# Patient Record
Sex: MALE | Race: WHITE | NOT HISPANIC OR LATINO | Employment: OTHER | URBAN - METROPOLITAN AREA
[De-identification: names, ages, dates, MRNs, and addresses within clinical notes are randomized per-mention and may not be internally consistent; named-entity substitution may affect disease eponyms.]

---

## 2021-01-14 DIAGNOSIS — Z23 NEED FOR VACCINATION: ICD-10-CM

## 2021-03-12 ENCOUNTER — IMMUNIZATION (OUTPATIENT)
Dept: FAMILY PLANNING/WOMEN'S HEALTH CLINIC | Facility: IMMUNIZATION CENTER | Age: 76
End: 2021-03-12
Attending: INTERNAL MEDICINE
Payer: MEDICARE

## 2021-03-12 DIAGNOSIS — Z23 NEED FOR VACCINATION: ICD-10-CM

## 2021-03-12 DIAGNOSIS — Z23 ENCOUNTER FOR VACCINATION: Primary | ICD-10-CM

## 2021-03-12 PROCEDURE — 0011A MODERNA SARS-COV-2 VACCINE: CPT

## 2021-03-12 PROCEDURE — 91301 MODERNA SARS-COV-2 VACCINE: CPT

## 2021-04-10 ENCOUNTER — IMMUNIZATION (OUTPATIENT)
Dept: FAMILY PLANNING/WOMEN'S HEALTH CLINIC | Facility: IMMUNIZATION CENTER | Age: 76
End: 2021-04-10
Attending: INTERNAL MEDICINE
Payer: MEDICARE

## 2021-04-10 DIAGNOSIS — Z23 ENCOUNTER FOR VACCINATION: Primary | ICD-10-CM

## 2021-04-10 PROCEDURE — 0012A MODERNA SARS-COV-2 VACCINE: CPT

## 2021-04-10 PROCEDURE — 91301 MODERNA SARS-COV-2 VACCINE: CPT

## 2021-11-15 ENCOUNTER — OFFICE VISIT (OUTPATIENT)
Dept: MEDICAL GROUP | Facility: CLINIC | Age: 76
End: 2021-11-15
Payer: COMMERCIAL

## 2021-11-15 VITALS
BODY MASS INDEX: 41.75 KG/M2 | OXYGEN SATURATION: 95 % | SYSTOLIC BLOOD PRESSURE: 140 MMHG | DIASTOLIC BLOOD PRESSURE: 80 MMHG | WEIGHT: 315 LBS | HEIGHT: 73 IN | HEART RATE: 76 BPM | TEMPERATURE: 97.6 F

## 2021-11-15 DIAGNOSIS — F41.9 ANXIETY: ICD-10-CM

## 2021-11-15 DIAGNOSIS — R35.1 BENIGN PROSTATIC HYPERPLASIA WITH NOCTURIA: ICD-10-CM

## 2021-11-15 DIAGNOSIS — N40.1 BENIGN PROSTATIC HYPERPLASIA WITH NOCTURIA: ICD-10-CM

## 2021-11-15 PROBLEM — M17.0 PRIMARY OSTEOARTHRITIS OF BOTH KNEES: Status: ACTIVE | Noted: 2020-10-05

## 2021-11-15 PROBLEM — N18.30 CHRONIC RENAL FAILURE IN PEDIATRIC PATIENT, STAGE 3 (MODERATE): Status: ACTIVE | Noted: 2020-09-15

## 2021-11-15 PROBLEM — I62.9 INTRACRANIAL HEMORRHAGE (HCC): Status: ACTIVE | Noted: 2021-02-25

## 2021-11-15 PROBLEM — H83.3X3 NOISE-INDUCED HEARING LOSS OF BOTH EARS: Status: ACTIVE | Noted: 2019-08-29

## 2021-11-15 PROBLEM — M10.9 GOUT: Status: ACTIVE | Noted: 2019-01-09

## 2021-11-15 PROBLEM — I10 ESSENTIAL HYPERTENSION: Status: ACTIVE | Noted: 2019-01-09

## 2021-11-15 PROBLEM — Z12.11 COLON CANCER SCREENING: Status: ACTIVE | Noted: 2021-02-25

## 2021-11-15 PROBLEM — N18.9 CHRONIC KIDNEY DISEASE, UNSPECIFIED: Status: ACTIVE | Noted: 2021-04-27

## 2021-11-15 PROBLEM — E66.01 MORBID OBESITY (HCC): Status: ACTIVE | Noted: 2020-09-15

## 2021-11-15 PROBLEM — N40.0 BENIGN PROSTATIC HYPERPLASIA: Status: ACTIVE | Noted: 2021-02-25

## 2021-11-15 PROBLEM — R73.03 PREDIABETES: Status: ACTIVE | Noted: 2021-04-27

## 2021-11-15 PROCEDURE — 99214 OFFICE O/P EST MOD 30 MIN: CPT | Mod: GC | Performed by: STUDENT IN AN ORGANIZED HEALTH CARE EDUCATION/TRAINING PROGRAM

## 2021-11-15 RX ORDER — COLCHICINE 0.6 MG/1
0.6 TABLET ORAL DAILY
Qty: 30 TABLET | Refills: 3 | Status: SHIPPED | OUTPATIENT
Start: 2021-11-15 | End: 2022-05-17

## 2021-11-15 RX ORDER — PRAZOSIN HYDROCHLORIDE 1 MG/1
1 CAPSULE ORAL EVERY EVENING
Qty: 30 CAPSULE | Refills: 6 | Status: SHIPPED | OUTPATIENT
Start: 2021-11-15 | End: 2022-10-03 | Stop reason: SDUPTHER

## 2021-11-15 RX ORDER — ALLOPURINOL 300 MG/1
300 TABLET ORAL DAILY
Qty: 30 TABLET | Refills: 6 | Status: SHIPPED | OUTPATIENT
Start: 2021-11-15 | End: 2022-05-17

## 2021-11-15 RX ORDER — PRAZOSIN HYDROCHLORIDE 1 MG/1
1 CAPSULE ORAL NIGHTLY
COMMUNITY
End: 2021-11-15 | Stop reason: SDUPTHER

## 2021-11-15 RX ORDER — LISINOPRIL 10 MG/1
10 TABLET ORAL DAILY
Qty: 30 TABLET | Refills: 6 | Status: SHIPPED | OUTPATIENT
Start: 2021-11-15 | End: 2022-05-17

## 2021-11-15 RX ORDER — HYDROCHLOROTHIAZIDE 25 MG/1
25 TABLET ORAL DAILY
COMMUNITY
End: 2021-11-15

## 2021-11-15 RX ORDER — FLUOXETINE 10 MG/1
10 CAPSULE ORAL DAILY
Qty: 60 CAPSULE | Refills: 3 | Status: SHIPPED | OUTPATIENT
Start: 2021-11-15 | End: 2022-01-14

## 2021-11-15 RX ORDER — LISINOPRIL 10 MG/1
10 TABLET ORAL DAILY
COMMUNITY
End: 2021-11-15 | Stop reason: SDUPTHER

## 2021-11-15 RX ORDER — COLCHICINE 0.6 MG/1
0.6 TABLET ORAL DAILY
COMMUNITY
End: 2021-11-15 | Stop reason: SDUPTHER

## 2021-11-15 RX ORDER — PREDNISONE 20 MG/1
20 TABLET ORAL DAILY
Qty: 30 TABLET | Refills: 1 | Status: SHIPPED | OUTPATIENT
Start: 2021-11-15 | End: 2022-05-17

## 2021-11-15 RX ORDER — TAMSULOSIN HYDROCHLORIDE 0.4 MG/1
0.4 CAPSULE ORAL
Qty: 60 CAPSULE | Refills: 0 | Status: SHIPPED | OUTPATIENT
Start: 2021-11-15 | End: 2022-01-17 | Stop reason: SDUPTHER

## 2021-11-15 RX ORDER — PREDNISONE 20 MG/1
20 TABLET ORAL DAILY
COMMUNITY
End: 2021-11-15 | Stop reason: SDUPTHER

## 2021-11-15 RX ORDER — ALLOPURINOL 300 MG/1
300 TABLET ORAL DAILY
COMMUNITY
End: 2021-11-15 | Stop reason: SDUPTHER

## 2021-11-15 ASSESSMENT — PATIENT HEALTH QUESTIONNAIRE - PHQ9: CLINICAL INTERPRETATION OF PHQ2 SCORE: 0

## 2021-11-15 NOTE — PROGRESS NOTES
Subjective:     CC: Anxiety, poor sleep     HPI:   Sim presents today for medication refills and to discuss his anxiety       Problem   Anxiety    Significant burden of panic attacks and symptoms also consistent with PTSD. Daily morning panic attacks typically related to pervasive anxiety stemming from his health problems and debility as well as the suicide of his mother and brother.     Is interested in medication for anxiety, will consider therapy           Benign Prostatic Hyperplasia    Currently taking prazosin 1 mg nightly with reduction in night time awakenings. Would like another agent. Currently with approximately 3 night time awakenings, disturbs quality of sleep.           Goat well controlled on current regimen. No flares.       Current Outpatient Medications Ordered in Epic   Medication Sig Dispense Refill   • allopurinol (ZYLOPRIM) 300 MG Tab Take 300 mg by mouth every day. 300 mg am and 150 mg at night time     • lisinopril (PRINIVIL) 10 MG Tab Take 10 mg by mouth every day. 1 po dialy     • predniSONE (DELTASONE) 20 MG Tab Take 20 mg by mouth every day. As needed     • colchicine (COLCRYS) 0.6 MG Tab Take 0.6 mg by mouth every day. As needed for goult     • prazosin (MINIPRESS) 1 MG Cap Take 1 mg by mouth every evening. 1 po at night time     • FLUoxetine (PROZAC) 10 MG Cap Take 1 Capsule by mouth every day for 60 days. 60 Capsule 3   • tamsulosin (FLOMAX) 0.4 MG capsule Take 1 Capsule by mouth 1/2 hour after breakfast for 60 days. 60 Capsule 0     No current Epic-ordered facility-administered medications on file.       Health Maintenance: COVID 19 booster scheduled for Monday of next week.     Colonoscopy completed 2/25/21    ROS:  Gen: no fevers/chills, no changes in weight  Eyes: no changes in vision  ENT: no sore throat, no hearing loss, no bloody nose  Pulm: no sob, no cough  CV: no chest pain, no palpitations  GI: no nausea/vomiting, no diarrhea  : no dysuria  MSk: no myalgias  Skin: no  "rash  Neuro: no headaches, no numbness/tingling  Heme/Lymph: no easy bruising      Objective:     Exam:  /80   Pulse 76   Temp 36.4 °C (97.6 °F)   Ht 1.854 m (6' 1\")   Wt (!) 151 kg (333 lb)   SpO2 95%   BMI 43.93 kg/m²  Body mass index is 43.93 kg/m².    Gen: Alert and oriented, No apparent distress.  Neck: Neck is supple without lymphadenopathy.  Lungs: Normal effort, CTA bilaterally, no wheezes, rhonchi, or rales  CV: Regular rate and rhythm. No murmurs, rubs, or gallops.  Ext: No clubbing, cyanosis, edema.      Labs: Due for labs at next visit for T2DM, CKD3    Assessment & Plan:     76 y.o. male with the following -     Problem List Items Addressed This Visit     Benign prostatic hyperplasia     Addition of Tamsulosin to regimen            Anxiety     Behavioral therapy resources provided, counseling provided, rx for fluoxetine for anxiety with caution for weight gain as this is one of Mr. Osorio's prominent medical issues and he has recently had 40 lbs weight loss in the past year.              Patient needs slepp study, used to use CPAP, has symptoms of sleep apnea. Currently not interested in sleep study and would like to defer to follow up visit.     Follow up in 3-6 months for routine labs and to see how anxiety therapy is going.     "

## 2021-11-15 NOTE — ASSESSMENT & PLAN NOTE
Behavioral therapy resources provided, counseling provided, rx for fluoxetine for anxiety with caution for weight gain as this is one of Mr. Osorio's prominent medical issues and he has recently had 40 lbs weight loss in the past year.

## 2022-01-12 ENCOUNTER — PATIENT MESSAGE (OUTPATIENT)
Dept: MEDICAL GROUP | Facility: CLINIC | Age: 77
End: 2022-01-12

## 2022-01-13 NOTE — TELEPHONE ENCOUNTER
From: Sim Osorio  To: Resident Gabriela Bush  Sent: 1/12/2022 4:05 PM PST  Subject: Tamulosin refill    Please approve refill at [facility]   Thank you

## 2022-01-18 RX ORDER — TAMSULOSIN HYDROCHLORIDE 0.4 MG/1
0.4 CAPSULE ORAL
Qty: 90 CAPSULE | Refills: 3 | Status: SHIPPED | OUTPATIENT
Start: 2022-01-18 | End: 2022-03-21

## 2022-02-01 RX ORDER — TAMSULOSIN HYDROCHLORIDE 0.4 MG/1
CAPSULE ORAL
Qty: 60 CAPSULE | Refills: 0 | Status: SHIPPED | OUTPATIENT
Start: 2022-02-01 | End: 2022-08-09 | Stop reason: SDUPTHER

## 2022-03-21 ENCOUNTER — OFFICE VISIT (OUTPATIENT)
Dept: MEDICAL GROUP | Facility: CLINIC | Age: 77
End: 2022-03-21
Payer: COMMERCIAL

## 2022-03-21 ENCOUNTER — HOSPITAL ENCOUNTER (EMERGENCY)
Facility: MEDICAL CENTER | Age: 77
End: 2022-03-21
Attending: EMERGENCY MEDICINE
Payer: COMMERCIAL

## 2022-03-21 VITALS
RESPIRATION RATE: 16 BRPM | OXYGEN SATURATION: 93 % | TEMPERATURE: 97.7 F | HEIGHT: 73 IN | DIASTOLIC BLOOD PRESSURE: 76 MMHG | WEIGHT: 315 LBS | SYSTOLIC BLOOD PRESSURE: 121 MMHG | BODY MASS INDEX: 41.75 KG/M2 | HEART RATE: 74 BPM

## 2022-03-21 VITALS
HEIGHT: 73 IN | WEIGHT: 315 LBS | BODY MASS INDEX: 41.75 KG/M2 | OXYGEN SATURATION: 94 % | SYSTOLIC BLOOD PRESSURE: 125 MMHG | DIASTOLIC BLOOD PRESSURE: 27 MMHG | HEART RATE: 81 BPM

## 2022-03-21 DIAGNOSIS — L02.91 ABSCESS: ICD-10-CM

## 2022-03-21 DIAGNOSIS — R22.31 AXILLARY MASS, RIGHT: ICD-10-CM

## 2022-03-21 PROCEDURE — A9270 NON-COVERED ITEM OR SERVICE: HCPCS | Performed by: EMERGENCY MEDICINE

## 2022-03-21 PROCEDURE — 700102 HCHG RX REV CODE 250 W/ 637 OVERRIDE(OP): Performed by: EMERGENCY MEDICINE

## 2022-03-21 PROCEDURE — 99213 OFFICE O/P EST LOW 20 MIN: CPT | Mod: GC | Performed by: HEALTH CARE PROVIDER

## 2022-03-21 PROCEDURE — 99284 EMERGENCY DEPT VISIT MOD MDM: CPT

## 2022-03-21 PROCEDURE — 700101 HCHG RX REV CODE 250: Performed by: EMERGENCY MEDICINE

## 2022-03-21 PROCEDURE — 303977 HCHG I & D

## 2022-03-21 RX ORDER — LIDOCAINE HYDROCHLORIDE 20 MG/ML
20 INJECTION, SOLUTION INFILTRATION; PERINEURAL ONCE
Status: COMPLETED | OUTPATIENT
Start: 2022-03-21 | End: 2022-03-21

## 2022-03-21 RX ORDER — CLINDAMYCIN HYDROCHLORIDE 150 MG/1
150 CAPSULE ORAL ONCE
Status: COMPLETED | OUTPATIENT
Start: 2022-03-21 | End: 2022-03-21

## 2022-03-21 RX ORDER — CLINDAMYCIN HYDROCHLORIDE 150 MG/1
150 CAPSULE ORAL 3 TIMES DAILY
Qty: 30 CAPSULE | Refills: 0 | Status: SHIPPED | OUTPATIENT
Start: 2022-03-21 | End: 2022-05-17

## 2022-03-21 RX ORDER — HYDROCHLOROTHIAZIDE 25 MG/1
25 TABLET ORAL DAILY
COMMUNITY
Start: 2021-12-26 | End: 2022-05-17

## 2022-03-21 RX ADMIN — CLINDAMYCIN HYDROCHLORIDE 150 MG: 150 CAPSULE ORAL at 17:57

## 2022-03-21 RX ADMIN — LIDOCAINE HYDROCHLORIDE 20 ML: 20 INJECTION, SOLUTION INFILTRATION; PERINEURAL at 17:45

## 2022-03-21 ASSESSMENT — PATIENT HEALTH QUESTIONNAIRE - PHQ9: CLINICAL INTERPRETATION OF PHQ2 SCORE: 0

## 2022-03-21 NOTE — ED TRIAGE NOTES
Chief Complaint   Patient presents with   • Abscess     Abscess under right arm, denies fevers, n/v or body aches.

## 2022-03-21 NOTE — ASSESSMENT & PLAN NOTE
Pt noted to have approx 5cm axillary mass, most consistent with abscess but unable to r/o adenopathy. Due to size and proximity to axillary vessels, patient referred to ED for further evaluation and possible I&D.

## 2022-03-21 NOTE — PROGRESS NOTES
Subjective:     CC: Possible Axillary Abscess    HPI:   Sim is a 77 y.o. male with past medical history of HTN, BPH, and gout presenting today for evaluation of possible abscess. He noted small mass of R axilla few weeks ago that remained relatively stable and was minimally tender. Approx 3-4 days ago, the mass increased in size significantly and became exquisitely tender. It is now erythematous. No drainage.  He denies fever, chills, or pain elsewhere in body.    He describes history of abscess in similar location which was much smaller.    Problem   Axillary Mass, Right       Current Outpatient Medications Ordered in Epic   Medication Sig Dispense Refill   • tamsulosin (FLOMAX) 0.4 MG capsule TAKE 1 CAPSULE BY MOUTH 30 MINUTES AFTER BREAKFAST FOR 60 DAYS 60 Capsule 0   • allopurinol (ZYLOPRIM) 300 MG Tab Take 1 Tablet by mouth every day. 300 mg am and 150 mg at night time 30 Tablet 6   • colchicine (COLCRYS) 0.6 MG Tab Take 1 Tablet by mouth every day. As needed for goult 30 Tablet 3   • lisinopril (PRINIVIL) 10 MG Tab Take 1 Tablet by mouth every day. 1 po dialy 30 Tablet 6   • prazosin (MINIPRESS) 1 MG Cap Take 1 Capsule by mouth every evening. 1 po at night time 30 Capsule 6   • predniSONE (DELTASONE) 20 MG Tab Take 1 Tablet by mouth every day. As needed 30 Tablet 1   • hydroCHLOROthiazide (HYDRODIURIL) 25 MG Tab Take 25 mg by mouth every day.     • tamsulosin (FLOMAX) 0.4 MG capsule Take 1 Capsule by mouth 1/2 hour after breakfast for 90 days. (Patient not taking: Reported on 3/21/2022) 90 Capsule 3     No current [facility]-ordered facility-administered medications on file.         ROS:  Gen: no fevers/chills, no changes in weight  Eyes: no changes in vision  ENT: no sore throat, no hearing loss, no bloody nose  Pulm: no sob, no cough  CV: no chest pain, no palpitations  GI: no nausea/vomiting, no diarrhea  : no dysuria  MSk: no myalgias  Skin: See above  Neuro: no headaches, no numbness/tingling  Heme/Lymph: no  "easy bruising      Objective:     Exam:  BP (!) 125/27 (BP Location: Left arm, Patient Position: Sitting, BP Cuff Size: Large adult)   Pulse 81   Ht 1.854 m (6' 1\")   Wt (!) 151 kg (332 lb 11.2 oz)   SpO2 94%   BMI 43.89 kg/m²  Body mass index is 43.89 kg/m².    Gen:  Alert and oriented, No apparent distress.  HEENT: Neck is supple without lymphadenopathy, EOMI, no pharyngeal erythema or exudate  Lungs:  Normal effort, CTA bilaterally, no wheezes, rhonchi, or rales  CV:  Regular rate and rhythm. No murmurs, rubs, or gallops.  Abd:      Soft, non-tender, non-distended  Ext:  No clubbing, cyanosis, edema.  Derm:  5cm soft, nonfluctuant mass in R axilla, tender with erythema      Labs: None    Assessment & Plan:     77 y.o. male with the following -     Problem List Items Addressed This Visit     Axillary mass, right     Pt noted to have approx 5cm axillary mass, most consistent with abscess but unable to r/o adenopathy. Due to size and proximity to axillary vessels, patient referred to ED for further evaluation and possible I&D.               I spent a total of 20 minutes with record review, exam, communication with the patient, communication with other providers, and documentation of this encounter.      Return if symptoms worsen or fail to improve.        Chris Elizondo M.D.  [facility]  PGY-1        "

## 2022-03-22 NOTE — ED PROVIDER NOTES
"ED Provider Note    CHIEF COMPLAINT  Chief Complaint   Patient presents with   • Abscess       HPI  Sim Osorio is a 77 y.o. male here for evaluation of a right axillary abscess.  Patient states that the abscess is been about 3 weeks and steadily getting bigger.  He did have surrounding redness with it over the last few days.  He has no fever chills and no vomiting, he was seen by his primary care doctors that they were not comfortable lancing it.    ROS;  Please see HPI  O/W negative     PAST MEDICAL HISTORY   has a past medical history of BPH (benign prostatic hypertrophy), Heart murmur, Hypertension, and Renal disorder.    SOCIAL HISTORY  Social History     Tobacco Use   • Smoking status: Never Smoker   • Smokeless tobacco: Not on file   Vaping Use   • Vaping Use: Never used   Substance and Sexual Activity   • Alcohol use: No   • Drug use: No   • Sexual activity: Not Currently       SURGICAL HISTORY   has a past surgical history that includes hernia repair and other orthopedic surgery.    CURRENT MEDICATIONS  Home Medications    **Home medications have not yet been reviewed for this encounter**         ALLERGIES  No Known Allergies    REVIEW OF SYSTEMS  See HPI for further details. Review of systems as above, otherwise all other systems are negative.     PHYSICAL EXAM  VITAL SIGNS: /63   Pulse 77   Temp 36.2 °C (97.1 °F) (Temporal)   Resp 16   Ht 1.854 m (6' 1\")   Wt (!) 150 kg (331 lb 2.1 oz)   SpO2 92%   BMI 43.69 kg/m²     Constitutional: Well developed, well nourished. No acute distress.  HEENT: Normocephalic, atraumatic. MMM  Neck: Supple, Full range of motion   Chest/Pulmonary:  No respiratory distress.  Equal expansion   Musculoskeletal: No deformity, no edema, neurovascular intact.  Right axilla.  4 cm area of induration with central fluctuance.  Mild surrounding erythema of approximately 2 cm.  Tenderness to palpation.  Neuro: Clear speech, appropriate, cooperative, cranial nerves II-XII " grossly intact.  Psych: Normal mood and affect      PROCEDURES      Incision and Drainage Procedure    Indication: axillary abscess    Location: right axilla     Procedure: The patient was positioned appropriately and the skin over the incision site was draped in a sterile fashion. Local anesthesia was obtained by infiltration using 2% Lidocaine without epinephrine.  An incision was then made over the apex of the lesion and approximately 3 cc of thick, tenacious and purulent material was expressed. Loculations were broken up using forceps and more of the material was able to be expressed. The drainage cavity was then irrigated. Packing placed. The patient’s tetanus status was up to date and did not require a booster dose.    The patient tolerated the procedure well.    Complications: None      MEDICAL RECORD  I have reviewed patient's medical record and pertinent results are listed.    COURSE & MEDICAL DECISION MAKING  I have reviewed any medical record information, laboratory studies and radiographic results as noted above.    Patient was given oral antibiotics here, and a prescription sent to the pharmacy.  He will take them, and follow-up as needed.  He was given general surgery to follow-up with as well.    I you have had any blood pressure issues while here in the emergency department, please see your doctor for a further evaluation or work up.    Differential diagnoses include but not limited to: abscess/cyst    This patient presents with abscess .  At this time, I have counseled the patient/family regarding their medications, pain control, and follow up.  They will continue their medications, if any, as prescribed.  They will return immediately for any worsening symptoms and/or any other medical concerns.  They will see their doctor, or contact the doctor provided, in 1-2 days for follow up.       FINAL IMPRESSION  Abscess       Electronically signed by: Jose Tucker D.O., 3/21/2022 6:03 PM

## 2022-03-22 NOTE — ED NOTES
Discharge instructions given and discussed. RX for clindamycin  given and pt educated. Pt educated to come back to ER for new or worsening symptoms and follow up with Dr. Messer as instructed. Pt verbalized understanding. VSS. Pt  Discharged in stable condition.

## 2022-04-29 ENCOUNTER — TELEPHONE (OUTPATIENT)
Dept: MEDICAL GROUP | Facility: CLINIC | Age: 77
End: 2022-04-29
Payer: COMMERCIAL

## 2022-04-29 DIAGNOSIS — F41.9 ANXIETY: ICD-10-CM

## 2022-04-29 DIAGNOSIS — M10.9 GOUT, UNSPECIFIED CAUSE, UNSPECIFIED CHRONICITY, UNSPECIFIED SITE: ICD-10-CM

## 2022-04-29 DIAGNOSIS — I10 ESSENTIAL HYPERTENSION: ICD-10-CM

## 2022-04-29 RX ORDER — PRAZOSIN HYDROCHLORIDE 1 MG/1
1 CAPSULE ORAL NIGHTLY
Qty: 90 CAPSULE | Refills: 3 | Status: SHIPPED | OUTPATIENT
Start: 2022-04-29 | End: 2022-05-17

## 2022-04-29 RX ORDER — HYDROCHLOROTHIAZIDE 25 MG/1
25 TABLET ORAL DAILY
Qty: 90 TABLET | Refills: 3 | Status: ON HOLD | OUTPATIENT
Start: 2022-04-29 | End: 2022-05-24

## 2022-04-29 RX ORDER — ALLOPURINOL 100 MG/1
150 TABLET ORAL DAILY
Qty: 135 TABLET | Refills: 3 | Status: SHIPPED | OUTPATIENT
Start: 2022-04-29 | End: 2022-05-17

## 2022-04-29 RX ORDER — LISINOPRIL 10 MG/1
10 TABLET ORAL DAILY
Qty: 90 TABLET | Refills: 3 | Status: ON HOLD | OUTPATIENT
Start: 2022-04-29 | End: 2022-05-24

## 2022-04-29 RX ORDER — ALLOPURINOL 300 MG/1
300 TABLET ORAL DAILY
Qty: 90 TABLET | Refills: 3 | Status: SHIPPED | OUTPATIENT
Start: 2022-04-29 | End: 2022-05-17

## 2022-05-17 ENCOUNTER — HOSPITAL ENCOUNTER (INPATIENT)
Facility: MEDICAL CENTER | Age: 77
LOS: 10 days | DRG: 306 | End: 2022-05-27
Attending: EMERGENCY MEDICINE | Admitting: FAMILY MEDICINE
Payer: COMMERCIAL

## 2022-05-17 ENCOUNTER — APPOINTMENT (OUTPATIENT)
Dept: RADIOLOGY | Facility: MEDICAL CENTER | Age: 77
DRG: 306 | End: 2022-05-17
Attending: EMERGENCY MEDICINE
Payer: COMMERCIAL

## 2022-05-17 ENCOUNTER — APPOINTMENT (OUTPATIENT)
Dept: CARDIOLOGY | Facility: MEDICAL CENTER | Age: 77
DRG: 306 | End: 2022-05-17
Attending: STUDENT IN AN ORGANIZED HEALTH CARE EDUCATION/TRAINING PROGRAM
Payer: COMMERCIAL

## 2022-05-17 DIAGNOSIS — I62.9 INTRACRANIAL HEMORRHAGE (HCC): ICD-10-CM

## 2022-05-17 DIAGNOSIS — S83.512D RUPTURE OF ANTERIOR CRUCIATE LIGAMENT OF LEFT KNEE, SUBSEQUENT ENCOUNTER: ICD-10-CM

## 2022-05-17 DIAGNOSIS — R55 SYNCOPE AND COLLAPSE: ICD-10-CM

## 2022-05-17 DIAGNOSIS — S09.90XA CLOSED HEAD INJURY, INITIAL ENCOUNTER: ICD-10-CM

## 2022-05-17 DIAGNOSIS — M17.0 PRIMARY OSTEOARTHRITIS OF BOTH KNEES: ICD-10-CM

## 2022-05-17 DIAGNOSIS — S83.412D TEAR OF MEDIAL COLLATERAL LIGAMENT OF LEFT KNEE, SUBSEQUENT ENCOUNTER: ICD-10-CM

## 2022-05-17 DIAGNOSIS — I35.0 NONRHEUMATIC AORTIC VALVE STENOSIS: ICD-10-CM

## 2022-05-17 DIAGNOSIS — R55 SYNCOPE, UNSPECIFIED SYNCOPE TYPE: ICD-10-CM

## 2022-05-17 LAB
ALBUMIN SERPL BCP-MCNC: 3.5 G/DL (ref 3.2–4.9)
ALBUMIN/GLOB SERPL: 1 G/DL
ALP SERPL-CCNC: 66 U/L (ref 30–99)
ALT SERPL-CCNC: 12 U/L (ref 2–50)
ANION GAP SERPL CALC-SCNC: 13 MMOL/L (ref 7–16)
APTT PPP: 33.7 SEC (ref 24.7–36)
AST SERPL-CCNC: 16 U/L (ref 12–45)
BASOPHILS # BLD AUTO: 0.2 % (ref 0–1.8)
BASOPHILS # BLD: 0.02 K/UL (ref 0–0.12)
BILIRUB SERPL-MCNC: 0.7 MG/DL (ref 0.1–1.5)
BUN SERPL-MCNC: 30 MG/DL (ref 8–22)
CALCIUM SERPL-MCNC: 8.6 MG/DL (ref 8.5–10.5)
CHLORIDE SERPL-SCNC: 104 MMOL/L (ref 96–112)
CO2 SERPL-SCNC: 18 MMOL/L (ref 20–33)
CREAT SERPL-MCNC: 1.61 MG/DL (ref 0.5–1.4)
EKG IMPRESSION: NORMAL
EOSINOPHIL # BLD AUTO: 0.02 K/UL (ref 0–0.51)
EOSINOPHIL NFR BLD: 0.2 % (ref 0–6.9)
ERYTHROCYTE [DISTWIDTH] IN BLOOD BY AUTOMATED COUNT: 45.7 FL (ref 35.9–50)
GFR SERPLBLD CREATININE-BSD FMLA CKD-EPI: 43 ML/MIN/1.73 M 2
GLOBULIN SER CALC-MCNC: 3.4 G/DL (ref 1.9–3.5)
GLUCOSE SERPL-MCNC: 132 MG/DL (ref 65–99)
HCT VFR BLD AUTO: 42.8 % (ref 42–52)
HCT VFR BLD AUTO: 46.1 % (ref 42–52)
HGB BLD-MCNC: 13.8 G/DL (ref 14–18)
HGB BLD-MCNC: 15.1 G/DL (ref 14–18)
IMM GRANULOCYTES # BLD AUTO: 0.08 K/UL (ref 0–0.11)
IMM GRANULOCYTES NFR BLD AUTO: 0.6 % (ref 0–0.9)
INR PPP: 1.24 (ref 0.87–1.13)
LV EJECT FRACT  99904: 30
LYMPHOCYTES # BLD AUTO: 1.28 K/UL (ref 1–4.8)
LYMPHOCYTES NFR BLD: 9.7 % (ref 22–41)
MAGNESIUM SERPL-MCNC: 2 MG/DL (ref 1.5–2.5)
MCH RBC QN AUTO: 28.3 PG (ref 27–33)
MCHC RBC AUTO-ENTMCNC: 32.2 G/DL (ref 33.7–35.3)
MCV RBC AUTO: 87.9 FL (ref 81.4–97.8)
MONOCYTES # BLD AUTO: 1.54 K/UL (ref 0–0.85)
MONOCYTES NFR BLD AUTO: 11.7 % (ref 0–13.4)
NEUTROPHILS # BLD AUTO: 10.24 K/UL (ref 1.82–7.42)
NEUTROPHILS NFR BLD: 77.6 % (ref 44–72)
NRBC # BLD AUTO: 0 K/UL
NRBC BLD-RTO: 0 /100 WBC
PLATELET # BLD AUTO: 176 K/UL (ref 164–446)
PMV BLD AUTO: 10.8 FL (ref 9–12.9)
POTASSIUM SERPL-SCNC: 3.3 MMOL/L (ref 3.6–5.5)
PROT SERPL-MCNC: 6.9 G/DL (ref 6–8.2)
PROTHROMBIN TIME: 15.2 SEC (ref 12–14.6)
RBC # BLD AUTO: 4.87 M/UL (ref 4.7–6.1)
SODIUM SERPL-SCNC: 135 MMOL/L (ref 135–145)
TROPONIN T SERPL-MCNC: 31 NG/L (ref 6–19)
TROPONIN T SERPL-MCNC: 33 NG/L (ref 6–19)
TROPONIN T SERPL-MCNC: 40 NG/L (ref 6–19)
WBC # BLD AUTO: 13.2 K/UL (ref 4.8–10.8)

## 2022-05-17 PROCEDURE — 85610 PROTHROMBIN TIME: CPT

## 2022-05-17 PROCEDURE — 99285 EMERGENCY DEPT VISIT HI MDM: CPT

## 2022-05-17 PROCEDURE — 96372 THER/PROPH/DIAG INJ SC/IM: CPT

## 2022-05-17 PROCEDURE — 83735 ASSAY OF MAGNESIUM: CPT

## 2022-05-17 PROCEDURE — 96367 TX/PROPH/DG ADDL SEQ IV INF: CPT

## 2022-05-17 PROCEDURE — 93306 TTE W/DOPPLER COMPLETE: CPT

## 2022-05-17 PROCEDURE — 72131 CT LUMBAR SPINE W/O DYE: CPT | Mod: ME

## 2022-05-17 PROCEDURE — 70450 CT HEAD/BRAIN W/O DYE: CPT | Mod: ME

## 2022-05-17 PROCEDURE — 770020 HCHG ROOM/CARE - TELE (206)

## 2022-05-17 PROCEDURE — 96366 THER/PROPH/DIAG IV INF ADDON: CPT

## 2022-05-17 PROCEDURE — 85018 HEMOGLOBIN: CPT

## 2022-05-17 PROCEDURE — 700111 HCHG RX REV CODE 636 W/ 250 OVERRIDE (IP): Performed by: EMERGENCY MEDICINE

## 2022-05-17 PROCEDURE — 96365 THER/PROPH/DIAG IV INF INIT: CPT

## 2022-05-17 PROCEDURE — 96368 THER/DIAG CONCURRENT INF: CPT

## 2022-05-17 PROCEDURE — 85014 HEMATOCRIT: CPT

## 2022-05-17 PROCEDURE — 700105 HCHG RX REV CODE 258: Performed by: EMERGENCY MEDICINE

## 2022-05-17 PROCEDURE — 99223 1ST HOSP IP/OBS HIGH 75: CPT | Mod: GC | Performed by: FAMILY MEDICINE

## 2022-05-17 PROCEDURE — 700102 HCHG RX REV CODE 250 W/ 637 OVERRIDE(OP): Performed by: STUDENT IN AN ORGANIZED HEALTH CARE EDUCATION/TRAINING PROGRAM

## 2022-05-17 PROCEDURE — 72128 CT CHEST SPINE W/O DYE: CPT

## 2022-05-17 PROCEDURE — 99223 1ST HOSP IP/OBS HIGH 75: CPT | Performed by: INTERNAL MEDICINE

## 2022-05-17 PROCEDURE — 85730 THROMBOPLASTIN TIME PARTIAL: CPT

## 2022-05-17 PROCEDURE — A9270 NON-COVERED ITEM OR SERVICE: HCPCS | Performed by: FAMILY MEDICINE

## 2022-05-17 PROCEDURE — 93005 ELECTROCARDIOGRAM TRACING: CPT | Performed by: EMERGENCY MEDICINE

## 2022-05-17 PROCEDURE — 700111 HCHG RX REV CODE 636 W/ 250 OVERRIDE (IP): Performed by: STUDENT IN AN ORGANIZED HEALTH CARE EDUCATION/TRAINING PROGRAM

## 2022-05-17 PROCEDURE — 36415 COLL VENOUS BLD VENIPUNCTURE: CPT

## 2022-05-17 PROCEDURE — A9270 NON-COVERED ITEM OR SERVICE: HCPCS | Performed by: STUDENT IN AN ORGANIZED HEALTH CARE EDUCATION/TRAINING PROGRAM

## 2022-05-17 PROCEDURE — 93306 TTE W/DOPPLER COMPLETE: CPT | Mod: 26 | Performed by: INTERNAL MEDICINE

## 2022-05-17 PROCEDURE — 73560 X-RAY EXAM OF KNEE 1 OR 2: CPT | Mod: LT

## 2022-05-17 PROCEDURE — 94760 N-INVAS EAR/PLS OXIMETRY 1: CPT

## 2022-05-17 PROCEDURE — 93005 ELECTROCARDIOGRAM TRACING: CPT | Performed by: INTERNAL MEDICINE

## 2022-05-17 PROCEDURE — 96375 TX/PRO/DX INJ NEW DRUG ADDON: CPT

## 2022-05-17 PROCEDURE — 84484 ASSAY OF TROPONIN QUANT: CPT | Mod: 91

## 2022-05-17 PROCEDURE — 85025 COMPLETE CBC W/AUTO DIFF WBC: CPT

## 2022-05-17 PROCEDURE — 700105 HCHG RX REV CODE 258: Performed by: STUDENT IN AN ORGANIZED HEALTH CARE EDUCATION/TRAINING PROGRAM

## 2022-05-17 PROCEDURE — 80053 COMPREHEN METABOLIC PANEL: CPT

## 2022-05-17 PROCEDURE — 700102 HCHG RX REV CODE 250 W/ 637 OVERRIDE(OP): Performed by: FAMILY MEDICINE

## 2022-05-17 PROCEDURE — 700117 HCHG RX CONTRAST REV CODE 255: Performed by: STUDENT IN AN ORGANIZED HEALTH CARE EDUCATION/TRAINING PROGRAM

## 2022-05-17 RX ORDER — SODIUM CHLORIDE 9 MG/ML
INJECTION, SOLUTION INTRAVENOUS CONTINUOUS
Status: DISCONTINUED | OUTPATIENT
Start: 2022-05-17 | End: 2022-05-18

## 2022-05-17 RX ORDER — ALLOPURINOL 300 MG/1
300-450 TABLET ORAL 2 TIMES DAILY
COMMUNITY
End: 2022-10-03 | Stop reason: SDUPTHER

## 2022-05-17 RX ORDER — POLYETHYLENE GLYCOL 3350 17 G/17G
1 POWDER, FOR SOLUTION ORAL
Status: DISCONTINUED | OUTPATIENT
Start: 2022-05-17 | End: 2022-05-27 | Stop reason: HOSPADM

## 2022-05-17 RX ORDER — SODIUM CHLORIDE 9 MG/ML
INJECTION, SOLUTION INTRAVENOUS
Status: COMPLETED
Start: 2022-05-17 | End: 2022-05-17

## 2022-05-17 RX ORDER — ALLOPURINOL 300 MG/1
450 TABLET ORAL EVERY EVENING
Status: DISCONTINUED | OUTPATIENT
Start: 2022-05-17 | End: 2022-05-27 | Stop reason: HOSPADM

## 2022-05-17 RX ORDER — MORPHINE SULFATE 4 MG/ML
0-4 INJECTION INTRAVENOUS EVERY 4 HOURS PRN
Status: DISCONTINUED | OUTPATIENT
Start: 2022-05-17 | End: 2022-05-18

## 2022-05-17 RX ORDER — MORPHINE SULFATE 4 MG/ML
2 INJECTION INTRAVENOUS
Status: DISCONTINUED | OUTPATIENT
Start: 2022-05-17 | End: 2022-05-17

## 2022-05-17 RX ORDER — SODIUM CHLORIDE 9 MG/ML
1000 INJECTION, SOLUTION INTRAVENOUS ONCE
Status: COMPLETED | OUTPATIENT
Start: 2022-05-17 | End: 2022-05-17

## 2022-05-17 RX ORDER — MORPHINE SULFATE 4 MG/ML
2 INJECTION INTRAVENOUS ONCE
Status: COMPLETED | OUTPATIENT
Start: 2022-05-17 | End: 2022-05-17

## 2022-05-17 RX ORDER — ACETAMINOPHEN 325 MG/1
650 TABLET ORAL EVERY 6 HOURS PRN
Status: DISCONTINUED | OUTPATIENT
Start: 2022-05-17 | End: 2022-05-22

## 2022-05-17 RX ORDER — HYDROCHLOROTHIAZIDE 25 MG/1
25 TABLET ORAL DAILY
Status: DISCONTINUED | OUTPATIENT
Start: 2022-05-17 | End: 2022-05-18

## 2022-05-17 RX ORDER — AMOXICILLIN 250 MG
2 CAPSULE ORAL 2 TIMES DAILY
Status: DISCONTINUED | OUTPATIENT
Start: 2022-05-17 | End: 2022-05-26

## 2022-05-17 RX ORDER — POTASSIUM CHLORIDE 7.45 MG/ML
10 INJECTION INTRAVENOUS
Status: COMPLETED | OUTPATIENT
Start: 2022-05-17 | End: 2022-05-17

## 2022-05-17 RX ORDER — HEPARIN SODIUM 5000 [USP'U]/ML
5000 INJECTION, SOLUTION INTRAVENOUS; SUBCUTANEOUS EVERY 8 HOURS
Status: DISCONTINUED | OUTPATIENT
Start: 2022-05-17 | End: 2022-05-18

## 2022-05-17 RX ORDER — LISINOPRIL 10 MG/1
10 TABLET ORAL DAILY
Status: DISCONTINUED | OUTPATIENT
Start: 2022-05-17 | End: 2022-05-18

## 2022-05-17 RX ORDER — MORPHINE SULFATE 4 MG/ML
4 INJECTION INTRAVENOUS ONCE
Status: COMPLETED | OUTPATIENT
Start: 2022-05-17 | End: 2022-05-17

## 2022-05-17 RX ORDER — ONDANSETRON 2 MG/ML
4 INJECTION INTRAMUSCULAR; INTRAVENOUS ONCE
Status: COMPLETED | OUTPATIENT
Start: 2022-05-17 | End: 2022-05-17

## 2022-05-17 RX ORDER — BISACODYL 10 MG
10 SUPPOSITORY, RECTAL RECTAL
Status: DISCONTINUED | OUTPATIENT
Start: 2022-05-17 | End: 2022-05-27 | Stop reason: HOSPADM

## 2022-05-17 RX ORDER — PRAZOSIN HYDROCHLORIDE 1 MG/1
1 CAPSULE ORAL
Status: DISCONTINUED | OUTPATIENT
Start: 2022-05-17 | End: 2022-05-27 | Stop reason: HOSPADM

## 2022-05-17 RX ORDER — ACETAMINOPHEN 500 MG
500 TABLET ORAL EVERY MORNING
COMMUNITY

## 2022-05-17 RX ORDER — HYDROMORPHONE HYDROCHLORIDE 1 MG/ML
0.5 INJECTION, SOLUTION INTRAMUSCULAR; INTRAVENOUS; SUBCUTANEOUS ONCE
Status: COMPLETED | OUTPATIENT
Start: 2022-05-17 | End: 2022-05-17

## 2022-05-17 RX ORDER — ENOXAPARIN SODIUM 100 MG/ML
40 INJECTION SUBCUTANEOUS DAILY
Status: DISCONTINUED | OUTPATIENT
Start: 2022-05-17 | End: 2022-05-17

## 2022-05-17 RX ORDER — ALLOPURINOL 300 MG/1
300 TABLET ORAL EVERY MORNING
Status: DISCONTINUED | OUTPATIENT
Start: 2022-05-17 | End: 2022-05-27 | Stop reason: HOSPADM

## 2022-05-17 RX ORDER — PIPERACILLIN SODIUM, TAZOBACTAM SODIUM 3; .375 G/15ML; G/15ML
INJECTION, POWDER, LYOPHILIZED, FOR SOLUTION INTRAVENOUS
Status: COMPLETED
Start: 2022-05-17 | End: 2022-05-17

## 2022-05-17 RX ORDER — TAMSULOSIN HYDROCHLORIDE 0.4 MG/1
0.4 CAPSULE ORAL DAILY
Status: DISCONTINUED | OUTPATIENT
Start: 2022-05-17 | End: 2022-05-27 | Stop reason: HOSPADM

## 2022-05-17 RX ORDER — DOCUSATE SODIUM 100 MG/1
100 CAPSULE, LIQUID FILLED ORAL DAILY
COMMUNITY
End: 2022-07-12

## 2022-05-17 RX ADMIN — MORPHINE SULFATE 4 MG: 4 INJECTION INTRAVENOUS at 05:41

## 2022-05-17 RX ADMIN — HYDROMORPHONE HYDROCHLORIDE 0.5 MG: 1 INJECTION, SOLUTION INTRAMUSCULAR; INTRAVENOUS; SUBCUTANEOUS at 21:46

## 2022-05-17 RX ADMIN — PIPERACILLIN AND TAZOBACTAM 3.38 G: 3; .375 INJECTION, POWDER, LYOPHILIZED, FOR SOLUTION INTRAVENOUS; PARENTERAL at 07:00

## 2022-05-17 RX ADMIN — HUMAN ALBUMIN MICROSPHERES AND PERFLUTREN 3 ML: 10; .22 INJECTION, SOLUTION INTRAVENOUS at 16:00

## 2022-05-17 RX ADMIN — PRAZOSIN HYDROCHLORIDE 1 MG: 1 CAPSULE ORAL at 22:55

## 2022-05-17 RX ADMIN — MORPHINE SULFATE 2 MG: 4 INJECTION INTRAVENOUS at 18:55

## 2022-05-17 RX ADMIN — ALLOPURINOL 300 MG: 300 TABLET ORAL at 09:12

## 2022-05-17 RX ADMIN — POTASSIUM CHLORIDE 10 MEQ: 7.46 INJECTION, SOLUTION INTRAVENOUS at 10:22

## 2022-05-17 RX ADMIN — ALLOPURINOL 450 MG: 300 TABLET ORAL at 21:46

## 2022-05-17 RX ADMIN — SENNOSIDES AND DOCUSATE SODIUM 2 TABLET: 50; 8.6 TABLET ORAL at 09:12

## 2022-05-17 RX ADMIN — TAMSULOSIN HYDROCHLORIDE 0.4 MG: 0.4 CAPSULE ORAL at 09:00

## 2022-05-17 RX ADMIN — MORPHINE SULFATE 2 MG: 4 INJECTION INTRAVENOUS at 15:58

## 2022-05-17 RX ADMIN — ACETAMINOPHEN 650 MG: 325 TABLET, FILM COATED ORAL at 09:56

## 2022-05-17 RX ADMIN — SODIUM CHLORIDE: 9 INJECTION, SOLUTION INTRAVENOUS at 18:54

## 2022-05-17 RX ADMIN — HEPARIN SODIUM 5000 UNITS: 5000 INJECTION, SOLUTION INTRAVENOUS; SUBCUTANEOUS at 15:51

## 2022-05-17 RX ADMIN — SENNOSIDES AND DOCUSATE SODIUM 2 TABLET: 50; 8.6 TABLET ORAL at 17:28

## 2022-05-17 RX ADMIN — MORPHINE SULFATE 4 MG: 4 INJECTION INTRAVENOUS at 20:11

## 2022-05-17 RX ADMIN — PIPERACILLIN AND TAZOBACTAM 3.38 G: 3; .375 INJECTION, POWDER, LYOPHILIZED, FOR SOLUTION INTRAVENOUS; PARENTERAL at 10:20

## 2022-05-17 RX ADMIN — POTASSIUM CHLORIDE 10 MEQ: 7.46 INJECTION, SOLUTION INTRAVENOUS at 09:13

## 2022-05-17 RX ADMIN — SODIUM CHLORIDE 1000 ML: 9 INJECTION, SOLUTION INTRAVENOUS at 08:57

## 2022-05-17 RX ADMIN — SODIUM CHLORIDE: 9 INJECTION, SOLUTION INTRAVENOUS at 10:23

## 2022-05-17 RX ADMIN — HEPARIN SODIUM 5000 UNITS: 5000 INJECTION, SOLUTION INTRAVENOUS; SUBCUTANEOUS at 21:46

## 2022-05-17 RX ADMIN — ONDANSETRON 4 MG: 2 INJECTION INTRAMUSCULAR; INTRAVENOUS at 05:41

## 2022-05-17 RX ADMIN — PIPERACILLIN AND TAZOBACTAM 3.38 G: 3; .375 INJECTION, POWDER, LYOPHILIZED, FOR SOLUTION INTRAVENOUS; PARENTERAL at 19:59

## 2022-05-17 ASSESSMENT — PAIN DESCRIPTION - PAIN TYPE
TYPE: ACUTE PAIN

## 2022-05-17 ASSESSMENT — COGNITIVE AND FUNCTIONAL STATUS - GENERAL
MOVING TO AND FROM BED TO CHAIR: A LOT
MOVING FROM LYING ON BACK TO SITTING ON SIDE OF FLAT BED: A LOT
WALKING IN HOSPITAL ROOM: A LOT
SUGGESTED CMS G CODE MODIFIER DAILY ACTIVITY: CK
TURNING FROM BACK TO SIDE WHILE IN FLAT BAD: A LOT
HELP NEEDED FOR BATHING: A LOT
CLIMB 3 TO 5 STEPS WITH RAILING: A LOT
DRESSING REGULAR UPPER BODY CLOTHING: A LOT
SUGGESTED CMS G CODE MODIFIER MOBILITY: CL
MOBILITY SCORE: 12
DRESSING REGULAR LOWER BODY CLOTHING: A LOT
STANDING UP FROM CHAIR USING ARMS: A LOT
DAILY ACTIVITIY SCORE: 16
TOILETING: A LOT

## 2022-05-17 ASSESSMENT — PATIENT HEALTH QUESTIONNAIRE - PHQ9
2. FEELING DOWN, DEPRESSED, IRRITABLE, OR HOPELESS: NOT AT ALL
SUM OF ALL RESPONSES TO PHQ9 QUESTIONS 1 AND 2: 0
1. LITTLE INTEREST OR PLEASURE IN DOING THINGS: NOT AT ALL
SUM OF ALL RESPONSES TO PHQ9 QUESTIONS 1 AND 2: 0
1. LITTLE INTEREST OR PLEASURE IN DOING THINGS: NOT AT ALL
SUM OF ALL RESPONSES TO PHQ9 QUESTIONS 1 AND 2: 0
2. FEELING DOWN, DEPRESSED, IRRITABLE, OR HOPELESS: NOT AT ALL
2. FEELING DOWN, DEPRESSED, IRRITABLE, OR HOPELESS: NOT AT ALL
1. LITTLE INTEREST OR PLEASURE IN DOING THINGS: NOT AT ALL

## 2022-05-17 ASSESSMENT — FIBROSIS 4 INDEX
FIB4 SCORE: 2.07
FIB4 SCORE: 2.07

## 2022-05-17 ASSESSMENT — LIFESTYLE VARIABLES
TOTAL SCORE: 0
TOTAL SCORE: 0
CONSUMPTION TOTAL: NEGATIVE
AVERAGE NUMBER OF DAYS PER WEEK YOU HAVE A DRINK CONTAINING ALCOHOL: 0
ALCOHOL_USE: NO
TOTAL SCORE: 0
HAVE PEOPLE ANNOYED YOU BY CRITICIZING YOUR DRINKING: NO
EVER HAD A DRINK FIRST THING IN THE MORNING TO STEADY YOUR NERVES TO GET RID OF A HANGOVER: NO
HAVE YOU EVER FELT YOU SHOULD CUT DOWN ON YOUR DRINKING: NO
ON A TYPICAL DAY WHEN YOU DRINK ALCOHOL HOW MANY DRINKS DO YOU HAVE: 0
HOW MANY TIMES IN THE PAST YEAR HAVE YOU HAD 5 OR MORE DRINKS IN A DAY: 0
EVER FELT BAD OR GUILTY ABOUT YOUR DRINKING: NO

## 2022-05-17 NOTE — ED TRIAGE NOTES
Chief Complaint   Patient presents with   • Syncope     BIB [facility] from home. Pt went to the bathroom and woke up on the floor. Doesn't remember what happened. Doesn't take blood thinners. A&Ox4 GCS15     C/o L knee, R hip and back pain. Received 50mcg of fentanyl with EMS    Hx of spontaneous brain bleeds, HTN and BPH

## 2022-05-17 NOTE — ED NOTES
EKG completed at bedside by this RN, pt declined to have chaperone present for EKG. Wife at bedside.

## 2022-05-17 NOTE — H&P
"      [facility] MEDICINE H&P        PATIENT ID:  NAME:  Sim Osorio  MRN:               [id number]  YOB: 1945    Date of Admission: 5/17/2022     Attending: Dr. Ch    Resident: Herber Blanton M.D.    Primary Care Physician:  Dr. Russell    CC: Syncopal episode    HPI: Sim Osorio is a 77 y.o. male with a history of subdural hematoma s/p craniotomy in 2018, hypertension, BPH, and gout who presented after syncopal event.  This history was obtained from patient and his wife.  The patient states that he deals with chronic constipation relieved by Colace, but stopped the medication about 7 days ago due to diarrhea.  After he stopped the medication, his constipation quickly returned and he began to experience bloating and mild diffuse abdominal pressure. Approximately 5 days ago he began to feel weak, continued to be constipated, and developed a lack of appetite.  He also had left lower quadrant intermittent, burning abdominal pain that was nonradiating.  This intermittent and burning pain has continued, unchanged, since it began. He also had subjective fevers.  Early this morning the patient was ambulating to the restroom using his walker, when he says he \"lost consciousness.\"  He did not have any diaphoresis, chest pain, vision or hearing changes associated with the episode.  His wife then found him a few minutes after he fell to the ground.  Per wife, the patient was laying on his left side and said that the back of his head, lower back, and left knee were causing him pain.  EMS was called and the patient was brought to the ED.  Per the patient he does not have any cardiac history besides hypertension, and has never had a consultation with a cardiologist.  He also denies ever being told that he has ever had a cardiac arrhythmia.  Of note the patient states that he regularly feels presyncopal with sweating and darkening of his vision, usually with standing from a seated position.  He also " required craniotomy x2 in 2018 while in [facility] after a subdural hematoma was incidentally found during an ED visit for a hypertensive episode.      ERCourse:  Vitals in the ED included temperature of 98 °F, pulse of 73-79, respiratory rate of 15 and 19, systolic blood pressure of 99 to 120 mmHg, and oxygen saturation of 92 to 95% in ambient air.  Labs included elevated white blood cell count of 13.2, hemoglobin of 13.8, PT and INR of 15.2 and 1.24, respectively.  A chemistry panel showed potassium of 3.3, BUN of 30, and creatinine of 1.61.  His GFR was 43.  Troponin was elevated to 33. An EKG showed atrial fibrillation with rate in 80s and LBBB. Imaging included a CT head that did not show any acute abnormality and a CT of the lumbar spine that did not show any acute fracture, but did show colonic diverticula with hazy fat stranding adjacent to the sigmoid colon.  X-ray imaging of the left knee was ordered, and is pending.  Medications administered in the ED included morphine 4 mg and Zosyn.  [facility] family medicine was paged to evaluate the patient.    REVIEW OF SYSTEMS:   Ten systems reviewed and were negative except as noted in the HPI.                PAST MEDICAL HISTORY:  Past Medical History:   Diagnosis Date   • BPH (benign prostatic hypertrophy)    • Heart murmur    • Hypertension    • Renal disorder     kidney stones, enlarged prostrate       PAST SURGICAL HISTORY:  Past Surgical History:   Procedure Laterality Date   • HERNIA REPAIR     • OTHER ORTHOPEDIC SURGERY      rt rotator cuff,  leftknee        FAMILY HISTORY:  History reviewed. No pertinent family history.    SOCIAL HISTORY:   Pt lives in [place], [place], with his wife; feels safe at home; does not have to worry about food supply by the end of the month  Smoking-denied, never used tobacco  Etoh use-denied  Drug use-denied    DIET:   Orders Placed This Encounter   Procedures   • Diet NPO Restrict to: Sips with Medications     Standing Status:    Standing     Number of Occurrences:   1     Order Specific Question:   Diet NPO Restrict to:     Answer:   Sips with Medications [3]       ALLERGIES:  No Known Allergies    OUTPATIENT MEDICATIONS:    Current Facility-Administered Medications:   •  senna-docusate (PERICOLACE or SENOKOT S) 8.6-50 MG per tablet 2 Tablet, 2 Tablet, Oral, BID **AND** polyethylene glycol/lytes (MIRALAX) PACKET 1 Packet, 1 Packet, Oral, QDAY PRN **AND** magnesium hydroxide (MILK OF MAGNESIA) suspension 30 mL, 30 mL, Oral, QDAY PRN **AND** bisacodyl (DULCOLAX) suppository 10 mg, 10 mg, Rectal, QDAY PRN, Herber Blanton M.D.  •  NS infusion, , Intravenous, Continuous, Herber Blanton M.D.  •  acetaminophen (Tylenol) tablet 650 mg, 650 mg, Oral, Q6HRS PRN, Herber Blanton M.D.  •  piperacillin-tazobactam (ZOSYN) 1 g in  mL IVPB, 1 g, Intravenous, Once **AND** piperacillin-tazobactam (ZOSYN) 4.5 g in  mL IVPB, 4.5 g, Intravenous, Q6HRS, Herber Blanton M.D.  •  morphine 4 MG/ML injection 2 mg, 2 mg, Intravenous, Q2HRS PRN, Herber Blanton M.D.  •  allopurinol (ZYLOPRIM) tablet 300-450 mg, 300-450 mg, Oral, BID, Herber Blanton M.D.  •  [Held by provider] hydroCHLOROthiazide (HYDRODIURIL) tablet 25 mg, 25 mg, Oral, DAILY, Herber Blanton M.D.  •  [Held by provider] lisinopril (PRINIVIL) tablet 10 mg, 10 mg, Oral, DAILY, Herber Blanton M.D.  •  prazosin (MINIPRESS) capsule 1 mg, 1 mg, Oral, Q EVENING, Herber Blanton M.D.  •  tamsulosin (FLOMAX) capsule 0.4 mg, 0.4 mg, Oral, DAILY, Herber Blanton M.D.  •  NS (BOLUS) infusion 1,000 mL, 1,000 mL, Intravenous, Once, Herber Blanton M.D.  •  potassium chloride (KCL) ivpb 10 mEq, 10 mEq, Intravenous, Q HOUR, Herber Blanton M.D.    Current Outpatient Medications:   •  allopurinol (ZYLOPRIM) 300 MG Tab, Take 300-450 mg by mouth 2 times a day. 300 mg in the  mg in the PM, Disp: , Rfl:   •  acetaminophen (TYLENOL) 500 MG Tab, Take 500  mg by mouth every morning., Disp: , Rfl:   •  docusate sodium (COLACE) 100 MG Cap, Take 100 mg by mouth every day., Disp: , Rfl:   •  lisinopril (PRINIVIL) 10 MG Tab, Take 1 Tablet by mouth every day., Disp: 90 Tablet, Rfl: 3  •  hydroCHLOROthiazide (HYDRODIURIL) 25 MG Tab, Take 1 Tablet by mouth every day., Disp: 90 Tablet, Rfl: 3  •  tamsulosin (FLOMAX) 0.4 MG capsule, TAKE 1 CAPSULE BY MOUTH 30 MINUTES AFTER BREAKFAST FOR 60 DAYS (Patient taking differently: Take 0.4 mg by mouth every day.), Disp: 60 Capsule, Rfl: 0  •  prazosin (MINIPRESS) 1 MG Cap, Take 1 Capsule by mouth every evening. 1 po at night time, Disp: 30 Capsule, Rfl: 6    PHYSICAL EXAM:  Vitals:    22 0400 22 0500 22 0600 22 0711   BP: (!) 99/61 109/62 106/56 (!) 96/81   Pulse: 78 77 73 81   Resp:    Temp:       TempSrc:       SpO2: 96% 92% 93% 93%   Weight:       Height:       , Temp (24hrs), Av.7 °C (98 °F), Min:36.7 °C (98 °F), Max:36.7 °C (98 °F)  , Pulse Oximetry: 93 %, O2 (LPM): 0, O2 Delivery Device: None - Room Air    General: Pt resting in NAD, cooperative, appears ill; nasal cannula in place without any supplemental oxygen running at this time  Skin:  Pink, warm and pale pink.  No rashes  HEENT: NC/AT. PERRL. EOMI. MMM. No nasal discharge. Oropharynx nonerythematous without exudate/plaques; tenderness present at occiput without any hematoma, or visible lesion  Neck:  Supple without lymphadenopathy or rigidity. No JVD   Lungs:  Symmetrical.  CTAB with no W/R/R.  Good air movement   Cardiovascular: Heart sounds are extremely distant; however, I did not appreciate any rubs, murmurs, or gallops  Abdomen:  Abdomen is soft; bowel sounds are within normal limits; mild tenderness to palpation of the left lower quadrant; negative Goodman sign; negative McBurney point tenderness; no guarding; no rigidity. No masses noted.  Genitourinary: Deferred  Extremities:  Full range of motion.  Right lower extremity is  unremarkable; left lower extremity knee joint appears mildly edematous, without any surrounding erythema;. 2+ pulses in all extremities.  No edema present in the bilateral lower extremities  Spine:  Straight without vertebral anomalies.  Mild tenderness palpation of the lumbar spine  CNS:  Muscle tone is normal.  Proprioception is normal.  Cranial nerves II-XII grossly intact.  There are no focal deficits.  Alert and oriented to person, place, time and situation         LAB TESTS:   Recent Labs     05/17/22  0447   WBC 13.2*   RBC 4.87   HEMOGLOBIN 13.8*   HEMATOCRIT 42.8   MCV 87.9   MCH 28.3   RDW 45.7   PLATELETCT 176   MPV 10.8   NEUTSPOLYS 77.60*   LYMPHOCYTES 9.70*   MONOCYTES 11.70   EOSINOPHILS 0.20   BASOPHILS 0.20         Recent Labs     05/17/22 0447   SODIUM 135   POTASSIUM 3.3*   CHLORIDE 104   CO2 18*   BUN 30*   CREATININE 1.61*   CALCIUM 8.6   ALBUMIN 3.5       CULTURES:   Results     ** No results found for the last 168 hours. **          IMAGES:  CT-TSPINE W/O PLUS RECONS   Final Result         1.  No acute traumatic bony injury of the thoracic spine.   2.  Atherosclerosis and atherosclerotic coronary artery disease      CT-LSPINE W/O PLUS RECONS   Final Result         1.  No acute traumatic bony injury of the lumbar spine. Multilevel degenerative changes of lumbar spine diminish diagnostic sensitivity of this exam.   2.  Diverticulosis with hazy fat stranding adjacent to sigmoid colon suggesting changes of sigmoid diverticulitis.   3.  Atherosclerosis      CT-HEAD W/O   Final Result         1.  No acute intracranial abnormality.   2.  Left maxillary sinusitis changes   3.  Atherosclerosis.         EC-ECHOCARDIOGRAM COMPLETE W/O CONT    (Results Pending)   DX-KNEE 2- LEFT    (Results Pending)       CONSULTS:   None    ASSESSMENT/PLAN:   This is a 77 year old male with history of  subdural hematoma s/p craniotomy in 2018, hypertension, BPH, and gout who presented after a syncopal event. He  describes a history in which he has been constipated and dehydrated over the last few days, pointing towards reflex or orthostatic syncope; however, he also has atrial fibrillation on EKG. Additionally, he takes prazosin, lisinopril, and hydrochlorothiazide, which could also have led to the presentation of this episode due to their side effects. Given his history of subdural hematoma, probable paroxysmal atrial fibrillation, diverticulitis at advanced age and [place] syncope score, he has been admitted for further evaluation and treatment.    #Syncopal episode  #H/o subdural hematoma s/p craniotomy in 2018  -Differentials include medication induced syncopal event; orthostatic hypotension; or, reflex syncope in setting of acute abdominal pain and dehydration  -CT head did not show acute bleed  -Cranial nerve exam is WNL and he has no focal deficits  -Hemoglobin trended and remained within normal limits  Plan:  -Seizure and fall precautions  -Low threshold for repeat CT head  -MIVF @ 125 ml/hr  -Echocardiogram pending  -Telemetry monitoring    #Diverticulitis  -CT a/p shows diverticula with hazy fat stranding adjacent to the sigmoid colon  -Patient received a dose of zosyn in the ED  Plan:  -Full liquid diet  -Bowel prophylaxis regimen  -Continue maintenance IV fluids  -Zosyn 4.6g Q6hrs started on 5/17/21  -CBC tomorrow am    #Stage 3b CKD  #Hypotension  #Hypokalemia  -GFR 43 at admission, with BUN/Cr 30/1.61  -Likely prerenal due to dehydration, but there is possibility of obstruction due to BPH  Plan:  -Bladder scan protocol  -IV fluids at 125ml/hr  -Continue BPH medications  -Renal US if worsening  -BMP tomorrow AM  -Monitor outpatient; if has >5 gfr decrease per year or gfr <30, refer to nephrology  -Avoid nephrotoxins, renally dose medications  -Heparin DVT ppx    #Atrial fibrillation  #Elevated troponin  -EKG in ED shows afib with rate 80  -Patient asymptomatic  -Troponin 33, with repeat of 40  -ChadsVasc  score: 4  Plan:  -Continue troponin trend  -Telemetry monitoring  -echo pending  -Discuss anticoagulation risks and benefits prior to discharge    #Left knee pain  -Osteoarthritis on x-ray imaging without fracture or subluxation  Plan:  -PRN tylenol  -PT/OT    #BPH  -Continue home prazosin and tamsulosin  -Patient should have outpatient follow up regarding his prazosin, as it could contribute to his near syncopal episodes that he states he has fairly frequently    #Hypertension  -Holding lisinopril and HCTZ home doses  -Patient should have outpatient follow up regarding his ACEI and HCTZ, as they could contribute to his near syncopal episodes that he states he has fairly frequently    Core Measures:   Fluids: NS@125 mL/hour  Lines: PIV  Abx: Zosyn started 5/17/2022  DVT prophylaxis: heparin  Code Status: Full code    Disposition: Observation    Herber Blanton MD   PGY-2 Family Medicine Resident   [facility]

## 2022-05-17 NOTE — ED PROVIDER NOTES
ED Provider Note        Primary care provider: Gabriela Bush M.D.    I verified that the patient was wearing a mask and I was wearing appropriate PPE every time I entered the room. The patient's mask was on the patient at all times during my encounter except for a brief view of the oropharynx.      CHIEF COMPLAINT  Chief Complaint   Patient presents with   • Syncope     BIB [facility] from home. Pt went to the bathroom and woke up on the floor. Doesn't remember what happened. Doesn't take blood thinners. A&Ox4 GCS15       HPI  Sim Osorio is a 77 y.o. male who presents to the Emergency Department with chief complaint of syncope.  Patient was coming back from the restroom this evening after attempting to have a bowel movement and fell backwards and struck his head.  He does think that he passed out prior to falling.  He does not recall the event no loss of urination he reports no shaking movements no oral trauma.  Patient concerned as he does have a history of spontaneous subdural hematoma requiring craniotomy several years prior.  Patient reports severe pain left knee superior pain in his left back.  He also reports that he has been some abdominal discomfort over the last several days.  States that he feels as though he is constipated and unable to have full bowel movement and having some slight diffuse abdominal pains been using multiple over-the-counter laxatives.  No urinary issues no other acute symptoms or concerns at this time.  Pain is back and near severe range through movement or manipulation no alleviating factors.    REVIEW OF SYSTEMS  10 systems reviewed and otherwise negative, pertinent positives and negatives listed in the history of present illness.    PAST MEDICAL HISTORY   has a past medical history of BPH (benign prostatic hypertrophy), Heart murmur, Hypertension, and Renal disorder.    SURGICAL HISTORY   has a past surgical history that includes hernia repair and other orthopedic  "surgery.    SOCIAL HISTORY  Social History     Tobacco Use   • Smoking status: Never Smoker   Vaping Use   • Vaping Use: Never used   Substance Use Topics   • Alcohol use: No   • Drug use: No      Social History     Substance and Sexual Activity   Drug Use No       FAMILY HISTORY  Non-Contributory    CURRENT MEDICATIONS  Home Medications     Reviewed by Katy Shannon R.N. (Registered Nurse) on 05/17/22 at 0316  Med List Status: Not Addressed   Medication Last Dose Status   allopurinol (ZYLOPRIM) 100 MG Tab  Active   allopurinol (ZYLOPRIM) 300 MG Tab  Active   allopurinol (ZYLOPRIM) 300 MG Tab  Active   clindamycin (CLEOCIN) 150 MG Cap  Active   colchicine (COLCRYS) 0.6 MG Tab  Active   hydroCHLOROthiazide (HYDRODIURIL) 25 MG Tab  Active   hydroCHLOROthiazide (HYDRODIURIL) 25 MG Tab  Active   lisinopril (PRINIVIL) 10 MG Tab  Active   lisinopril (PRINIVIL) 10 MG Tab  Active   prazosin (MINIPRESS) 1 MG Cap  Active   prazosin (MINIPRESS) 1 MG Cap  Active   predniSONE (DELTASONE) 20 MG Tab  Active   tamsulosin (FLOMAX) 0.4 MG capsule  Active                ALLERGIES  No Known Allergies    PHYSICAL EXAM  VITAL SIGNS: /56   Pulse 73   Temp 36.7 °C (98 °F) (Temporal)   Resp 19   Ht 1.854 m (6' 1\")   Wt (!) 150 kg (330 lb)   SpO2 93%   BMI 43.54 kg/m²   Pulse ox interpretation: I interpret this pulse ox as normal.  Constitutional: Alert and oriented x 3, minimal distress  HEENT: Atraumatic normocephalic, pupils are equal round, extraocular movements are intact. The nares is clear, external ears are normal, mouth shows moist mucous membranes  Neck: no obvious JVD or tracheal deviation  Cardiovascular: Regular rate and rhythm no murmur rub or gallop   Thorax & Lungs: No respiratory distress, no wheezes rales or rhonchi, No chest tenderness.   GI: Soft nontender nondistended positive bowel sounds, no peritoneal signs  Skin: Warm dry no obvious acute rash or lesion  Musculoskeletal: Right lower and bilateral " upper extremities unremarkable in the left lower extremity normal range strength at the hip he has pain with flexion and extension of the knee and slight suprapatellar effusion left knee.  Normal dorsiflexion plantarflexion left foot normal cap refill and sensation left foot  Neurologic: Cranial nerves III through XII are grossly intact, no sensory deficit, no cerebellar dysfunction   Psychiatric: Appropriate affect for situation at this time      DIAGNOSTIC STUDIES / PROCEDURES  LABS      Results for orders placed or performed during the hospital encounter of 05/17/22   CBC WITH DIFFERENTIAL   Result Value Ref Range    WBC 13.2 (H) 4.8 - 10.8 K/uL    RBC 4.87 4.70 - 6.10 M/uL    Hemoglobin 13.8 (L) 14.0 - 18.0 g/dL    Hematocrit 42.8 42.0 - 52.0 %    MCV 87.9 81.4 - 97.8 fL    MCH 28.3 27.0 - 33.0 pg    MCHC 32.2 (L) 33.7 - 35.3 g/dL    RDW 45.7 35.9 - 50.0 fL    Platelet Count 176 164 - 446 K/uL    MPV 10.8 9.0 - 12.9 fL    Neutrophils-Polys 77.60 (H) 44.00 - 72.00 %    Lymphocytes 9.70 (L) 22.00 - 41.00 %    Monocytes 11.70 0.00 - 13.40 %    Eosinophils 0.20 0.00 - 6.90 %    Basophils 0.20 0.00 - 1.80 %    Immature Granulocytes 0.60 0.00 - 0.90 %    Nucleated RBC 0.00 /100 WBC    Neutrophils (Absolute) 10.24 (H) 1.82 - 7.42 K/uL    Lymphs (Absolute) 1.28 1.00 - 4.80 K/uL    Monos (Absolute) 1.54 (H) 0.00 - 0.85 K/uL    Eos (Absolute) 0.02 0.00 - 0.51 K/uL    Baso (Absolute) 0.02 0.00 - 0.12 K/uL    Immature Granulocytes (abs) 0.08 0.00 - 0.11 K/uL    NRBC (Absolute) 0.00 K/uL   PROTHROMBIN TIME   Result Value Ref Range    PT 15.2 (H) 12.0 - 14.6 sec    INR 1.24 (H) 0.87 - 1.13   APTT   Result Value Ref Range    APTT 33.7 24.7 - 36.0 sec   COMP METABOLIC PANEL   Result Value Ref Range    Sodium 135 135 - 145 mmol/L    Potassium 3.3 (L) 3.6 - 5.5 mmol/L    Chloride 104 96 - 112 mmol/L    Co2 18 (L) 20 - 33 mmol/L    Anion Gap 13.0 7.0 - 16.0    Glucose 132 (H) 65 - 99 mg/dL    Bun 30 (H) 8 - 22 mg/dL    Creatinine  1.61 (H) 0.50 - 1.40 mg/dL    Calcium 8.6 8.5 - 10.5 mg/dL    AST(SGOT) 16 12 - 45 U/L    ALT(SGPT) 12 2 - 50 U/L    Alkaline Phosphatase 66 30 - 99 U/L    Total Bilirubin 0.7 0.1 - 1.5 mg/dL    Albumin 3.5 3.2 - 4.9 g/dL    Total Protein 6.9 6.0 - 8.2 g/dL    Globulin 3.4 1.9 - 3.5 g/dL    A-G Ratio 1.0 g/dL   TROPONIN   Result Value Ref Range    Troponin T 33 (H) 6 - 19 ng/L   ESTIMATED GFR   Result Value Ref Range    GFR (CKD-EPI) 43 (A) >60 mL/min/1.73 m 2   EKG (NOW)   Result Value Ref Range    Report       [facility] Emergency Dept.    Test Date:  2022  Pt Name:    DAVID ANDREA                  Department: ER  MRN:        [id number]                      Room:        11  Gender:     Male                         Technician: [id number]  :        1945                   Requested By:SEE BOLAND  Order #:    [id number]                    Reading MD:    Measurements  Intervals                                Axis  Rate:       80                           P:  TX:                                      QRS:        -30  QRSD:       170                          T:          131  QT:         453  QTc:        524    Interpretive Statements  Atrial fibrillation  Left bundle branch block  Compared to ECG 2011 07:34:43  Sinus bradycardia no longer present         All labs reviewed by me.      RADIOLOGY  CT-TSPINE W/O PLUS RECONS   Final Result         1.  No acute traumatic bony injury of the thoracic spine.   2.  Atherosclerosis and atherosclerotic coronary artery disease      CT-LSPINE W/O PLUS RECONS   Final Result         1.  No acute traumatic bony injury of the lumbar spine. Multilevel degenerative changes of lumbar spine diminish diagnostic sensitivity of this exam.   2.  Diverticulosis with hazy fat stranding adjacent to sigmoid colon suggesting changes of sigmoid diverticulitis.   3.  Atherosclerosis      CT-HEAD W/O   Final Result         1.  No acute intracranial  "abnormality.   2.  Left maxillary sinusitis changes   3.  Atherosclerosis.         DX-KNEE 3 VIEWS LEFT    (Results Pending)         COURSE & MEDICAL DECISION MAKING  Pertinent Labs & Imaging studies reviewed. (See chart for details)    3:14 AM - Patient seen and examined at bedside.     Coagulation studies were ordered in light of head injury and concern for intracranial hemorrhage    Patient noted to have slightly elevated blood pressure likely circumstantial secondary to presenting complaint. Referred to primary care physician for further evaluation.        Medical Decision Making: Head CT is negative lumbar and thoracic spines are negative for an acute fracture.  Lumbar CT does demonstrate acute sigmoid diverticulitis.  He does have a left shift and leukocytosis.  He also has slight elevation of his troponin EKG is nonischemic.  This point patient has an acute diverticulitis with some abdominal pain and is also has a syncopal event that somewhat concerning especially given his past medical history.  Patient given 3.375 mg of Zosyn discussed with the family medicine resident housestaff and admitted to their care for ongoing evaluation and treatment admitted in guarded condition.    /56   Pulse 73   Temp 36.7 °C (98 °F) (Temporal)   Resp 19   Ht 1.854 m (6' 1\")   Wt (!) 150 kg (330 lb)   SpO2 93%   BMI 43.54 kg/m²         FINAL IMPRESSION  1. Closed head injury, initial encounter Active   2. Syncope, unspecified syncope type Active    3.  Back pain  4.  Knee pain  5.  Leukocytosis  6.  Acute sigmoid diverticulitis      This dictation has been created using voice recognition software and/or scribes. The accuracy of the dictation is limited by the abilities of the software and the expertise of the scribes. I expect there may be some errors of grammar and possibly content. I made every attempt to manually correct the errors within my dictation. However, errors related to voice recognition software and/or " scribes may still exist and should be interpreted within the appropriate context.

## 2022-05-17 NOTE — ED NOTES
Report received from Fabricio trauma RN. Pt received from CT scan. Pt reports hip, knee, and low back pain. VSS. Care assumed.

## 2022-05-17 NOTE — ED NOTES
Assisted pt to chair, unable to place weight on left leg due to knee pain.   Placed hospital bed. Updated with meal times and poc

## 2022-05-18 LAB
ALBUMIN SERPL BCP-MCNC: 3.2 G/DL (ref 3.2–4.9)
ALBUMIN/GLOB SERPL: 0.9 G/DL
ALP SERPL-CCNC: 61 U/L (ref 30–99)
ALT SERPL-CCNC: 14 U/L (ref 2–50)
ANION GAP SERPL CALC-SCNC: 11 MMOL/L (ref 7–16)
APTT PPP: 43.7 SEC (ref 24.7–36)
AST SERPL-CCNC: 28 U/L (ref 12–45)
BASOPHILS # BLD AUTO: 0.2 % (ref 0–1.8)
BASOPHILS # BLD: 0.03 K/UL (ref 0–0.12)
BILIRUB SERPL-MCNC: 0.9 MG/DL (ref 0.1–1.5)
BUN SERPL-MCNC: 24 MG/DL (ref 8–22)
CALCIUM SERPL-MCNC: 8.3 MG/DL (ref 8.5–10.5)
CHLORIDE SERPL-SCNC: 106 MMOL/L (ref 96–112)
CO2 SERPL-SCNC: 17 MMOL/L (ref 20–33)
CREAT SERPL-MCNC: 1.36 MG/DL (ref 0.5–1.4)
EKG IMPRESSION: NORMAL
EKG IMPRESSION: NORMAL
EOSINOPHIL # BLD AUTO: 0.02 K/UL (ref 0–0.51)
EOSINOPHIL NFR BLD: 0.2 % (ref 0–6.9)
ERYTHROCYTE [DISTWIDTH] IN BLOOD BY AUTOMATED COUNT: 47.6 FL (ref 35.9–50)
GFR SERPLBLD CREATININE-BSD FMLA CKD-EPI: 53 ML/MIN/1.73 M 2
GLOBULIN SER CALC-MCNC: 3.5 G/DL (ref 1.9–3.5)
GLUCOSE SERPL-MCNC: 124 MG/DL (ref 65–99)
HCT VFR BLD AUTO: 41.3 % (ref 42–52)
HGB BLD-MCNC: 12.9 G/DL (ref 14–18)
IMM GRANULOCYTES # BLD AUTO: 0.11 K/UL (ref 0–0.11)
IMM GRANULOCYTES NFR BLD AUTO: 0.9 % (ref 0–0.9)
INR PPP: 1.35 (ref 0.87–1.13)
LYMPHOCYTES # BLD AUTO: 0.95 K/UL (ref 1–4.8)
LYMPHOCYTES NFR BLD: 7.4 % (ref 22–41)
MCH RBC QN AUTO: 28.2 PG (ref 27–33)
MCHC RBC AUTO-ENTMCNC: 31.2 G/DL (ref 33.7–35.3)
MCV RBC AUTO: 90.4 FL (ref 81.4–97.8)
MONOCYTES # BLD AUTO: 1.93 K/UL (ref 0–0.85)
MONOCYTES NFR BLD AUTO: 15 % (ref 0–13.4)
NEUTROPHILS # BLD AUTO: 9.83 K/UL (ref 1.82–7.42)
NEUTROPHILS NFR BLD: 76.3 % (ref 44–72)
NRBC # BLD AUTO: 0 K/UL
NRBC BLD-RTO: 0 /100 WBC
PLATELET # BLD AUTO: 164 K/UL (ref 164–446)
PMV BLD AUTO: 10.9 FL (ref 9–12.9)
POTASSIUM SERPL-SCNC: 3.8 MMOL/L (ref 3.6–5.5)
PROT SERPL-MCNC: 6.7 G/DL (ref 6–8.2)
PROTHROMBIN TIME: 16.2 SEC (ref 12–14.6)
RBC # BLD AUTO: 4.57 M/UL (ref 4.7–6.1)
SODIUM SERPL-SCNC: 134 MMOL/L (ref 135–145)
UFH PPP CHRO-ACNC: <0.1 IU/ML (ref 0–9999)
WBC # BLD AUTO: 12.9 K/UL (ref 4.8–10.8)

## 2022-05-18 PROCEDURE — 700102 HCHG RX REV CODE 250 W/ 637 OVERRIDE(OP): Performed by: FAMILY MEDICINE

## 2022-05-18 PROCEDURE — A9270 NON-COVERED ITEM OR SERVICE: HCPCS | Performed by: NURSE PRACTITIONER

## 2022-05-18 PROCEDURE — 99233 SBSQ HOSP IP/OBS HIGH 50: CPT | Mod: FS | Performed by: INTERNAL MEDICINE

## 2022-05-18 PROCEDURE — 85520 HEPARIN ASSAY: CPT

## 2022-05-18 PROCEDURE — 85610 PROTHROMBIN TIME: CPT

## 2022-05-18 PROCEDURE — 97166 OT EVAL MOD COMPLEX 45 MIN: CPT

## 2022-05-18 PROCEDURE — 700111 HCHG RX REV CODE 636 W/ 250 OVERRIDE (IP): Performed by: STUDENT IN AN ORGANIZED HEALTH CARE EDUCATION/TRAINING PROGRAM

## 2022-05-18 PROCEDURE — 85025 COMPLETE CBC W/AUTO DIFF WBC: CPT

## 2022-05-18 PROCEDURE — 93010 ELECTROCARDIOGRAM REPORT: CPT | Performed by: INTERNAL MEDICINE

## 2022-05-18 PROCEDURE — 80053 COMPREHEN METABOLIC PANEL: CPT

## 2022-05-18 PROCEDURE — 51798 US URINE CAPACITY MEASURE: CPT

## 2022-05-18 PROCEDURE — 36415 COLL VENOUS BLD VENIPUNCTURE: CPT

## 2022-05-18 PROCEDURE — 93010 ELECTROCARDIOGRAM REPORT: CPT | Mod: 76 | Performed by: INTERNAL MEDICINE

## 2022-05-18 PROCEDURE — 700105 HCHG RX REV CODE 258: Performed by: STUDENT IN AN ORGANIZED HEALTH CARE EDUCATION/TRAINING PROGRAM

## 2022-05-18 PROCEDURE — 97162 PT EVAL MOD COMPLEX 30 MIN: CPT

## 2022-05-18 PROCEDURE — 93005 ELECTROCARDIOGRAM TRACING: CPT | Performed by: NURSE PRACTITIONER

## 2022-05-18 PROCEDURE — A9270 NON-COVERED ITEM OR SERVICE: HCPCS | Performed by: FAMILY MEDICINE

## 2022-05-18 PROCEDURE — 700102 HCHG RX REV CODE 250 W/ 637 OVERRIDE(OP): Performed by: NURSE PRACTITIONER

## 2022-05-18 PROCEDURE — A9270 NON-COVERED ITEM OR SERVICE: HCPCS | Performed by: STUDENT IN AN ORGANIZED HEALTH CARE EDUCATION/TRAINING PROGRAM

## 2022-05-18 PROCEDURE — 85730 THROMBOPLASTIN TIME PARTIAL: CPT

## 2022-05-18 PROCEDURE — 770020 HCHG ROOM/CARE - TELE (206)

## 2022-05-18 PROCEDURE — 700102 HCHG RX REV CODE 250 W/ 637 OVERRIDE(OP): Performed by: STUDENT IN AN ORGANIZED HEALTH CARE EDUCATION/TRAINING PROGRAM

## 2022-05-18 PROCEDURE — 99233 SBSQ HOSP IP/OBS HIGH 50: CPT | Mod: GC | Performed by: FAMILY MEDICINE

## 2022-05-18 RX ORDER — HYDROMORPHONE HYDROCHLORIDE 1 MG/ML
0.5 INJECTION, SOLUTION INTRAMUSCULAR; INTRAVENOUS; SUBCUTANEOUS
Status: COMPLETED | OUTPATIENT
Start: 2022-05-18 | End: 2022-05-18

## 2022-05-18 RX ORDER — OXYCODONE HYDROCHLORIDE 10 MG/1
10 TABLET ORAL EVERY 4 HOURS PRN
Status: DISCONTINUED | OUTPATIENT
Start: 2022-05-18 | End: 2022-05-20

## 2022-05-18 RX ORDER — OXYCODONE HYDROCHLORIDE 5 MG/1
5 TABLET ORAL EVERY 4 HOURS PRN
Status: DISCONTINUED | OUTPATIENT
Start: 2022-05-18 | End: 2022-05-22

## 2022-05-18 RX ORDER — HEPARIN SODIUM 5000 [USP'U]/100ML
0-30 INJECTION, SOLUTION INTRAVENOUS CONTINUOUS
Status: DISCONTINUED | OUTPATIENT
Start: 2022-05-18 | End: 2022-05-20

## 2022-05-18 RX ORDER — NITROGLYCERIN 0.4 MG/1
0.4 TABLET SUBLINGUAL
Status: DISCONTINUED | OUTPATIENT
Start: 2022-05-18 | End: 2022-05-27 | Stop reason: HOSPADM

## 2022-05-18 RX ORDER — METOPROLOL SUCCINATE 25 MG/1
12.5 TABLET, EXTENDED RELEASE ORAL
Status: DISCONTINUED | OUTPATIENT
Start: 2022-05-18 | End: 2022-05-27 | Stop reason: HOSPADM

## 2022-05-18 RX ORDER — HYDROMORPHONE HYDROCHLORIDE 1 MG/ML
0.5 INJECTION, SOLUTION INTRAMUSCULAR; INTRAVENOUS; SUBCUTANEOUS
Status: DISCONTINUED | OUTPATIENT
Start: 2022-05-18 | End: 2022-05-18

## 2022-05-18 RX ORDER — HYDROMORPHONE HYDROCHLORIDE 1 MG/ML
0.5 INJECTION, SOLUTION INTRAMUSCULAR; INTRAVENOUS; SUBCUTANEOUS EVERY 4 HOURS PRN
Status: DISCONTINUED | OUTPATIENT
Start: 2022-05-18 | End: 2022-05-19

## 2022-05-18 RX ORDER — CAPTOPRIL 12.5 MG/1
6.25 TABLET ORAL EVERY 8 HOURS
Status: DISCONTINUED | OUTPATIENT
Start: 2022-05-18 | End: 2022-05-19

## 2022-05-18 RX ADMIN — MORPHINE SULFATE 4 MG: 4 INJECTION INTRAVENOUS at 08:14

## 2022-05-18 RX ADMIN — SENNOSIDES AND DOCUSATE SODIUM 2 TABLET: 50; 8.6 TABLET ORAL at 17:41

## 2022-05-18 RX ADMIN — HYDROMORPHONE HYDROCHLORIDE 0.5 MG: 1 INJECTION, SOLUTION INTRAMUSCULAR; INTRAVENOUS; SUBCUTANEOUS at 16:02

## 2022-05-18 RX ADMIN — SENNOSIDES AND DOCUSATE SODIUM 2 TABLET: 50; 8.6 TABLET ORAL at 05:10

## 2022-05-18 RX ADMIN — HEPARIN SODIUM 5000 UNITS: 5000 INJECTION, SOLUTION INTRAVENOUS; SUBCUTANEOUS at 05:10

## 2022-05-18 RX ADMIN — ALLOPURINOL 300 MG: 300 TABLET ORAL at 05:10

## 2022-05-18 RX ADMIN — CAPTOPRIL 6.25 MG: 12.5 TABLET ORAL at 21:38

## 2022-05-18 RX ADMIN — TAMSULOSIN HYDROCHLORIDE 0.4 MG: 0.4 CAPSULE ORAL at 05:10

## 2022-05-18 RX ADMIN — HEPARIN SODIUM 5000 UNITS: 5000 INJECTION, SOLUTION INTRAVENOUS; SUBCUTANEOUS at 14:40

## 2022-05-18 RX ADMIN — ALLOPURINOL 450 MG: 300 TABLET ORAL at 21:38

## 2022-05-18 RX ADMIN — CAPTOPRIL 6.25 MG: 12.5 TABLET ORAL at 14:40

## 2022-05-18 RX ADMIN — PIPERACILLIN AND TAZOBACTAM 3.38 G: 3; .375 INJECTION, POWDER, LYOPHILIZED, FOR SOLUTION INTRAVENOUS; PARENTERAL at 08:15

## 2022-05-18 RX ADMIN — MORPHINE SULFATE 4 MG: 4 INJECTION INTRAVENOUS at 12:43

## 2022-05-18 RX ADMIN — OXYCODONE 5 MG: 5 TABLET ORAL at 23:33

## 2022-05-18 RX ADMIN — OXYCODONE 5 MG: 5 TABLET ORAL at 19:44

## 2022-05-18 RX ADMIN — CAPTOPRIL 6.25 MG: 12.5 TABLET ORAL at 08:14

## 2022-05-18 RX ADMIN — METOPROLOL SUCCINATE 12.5 MG: 25 TABLET, EXTENDED RELEASE ORAL at 14:39

## 2022-05-18 RX ADMIN — PIPERACILLIN AND TAZOBACTAM 3.38 G: 3; .375 INJECTION, POWDER, LYOPHILIZED, FOR SOLUTION INTRAVENOUS; PARENTERAL at 16:02

## 2022-05-18 RX ADMIN — HEPARIN SODIUM 18 UNITS/KG/HR: 5000 INJECTION, SOLUTION INTRAVENOUS at 17:42

## 2022-05-18 RX ADMIN — OXYCODONE 5 MG: 5 TABLET ORAL at 14:45

## 2022-05-18 ASSESSMENT — ENCOUNTER SYMPTOMS
WHEEZING: 0
NAUSEA: 0
HEADACHES: 0
SHORTNESS OF BREATH: 0
CHEST TIGHTNESS: 1
FEVER: 0
CHILLS: 0
DIZZINESS: 0
VOMITING: 0
COUGH: 0
PALPITATIONS: 0

## 2022-05-18 ASSESSMENT — PAIN DESCRIPTION - PAIN TYPE
TYPE: ACUTE PAIN

## 2022-05-18 ASSESSMENT — COGNITIVE AND FUNCTIONAL STATUS - GENERAL
HELP NEEDED FOR BATHING: A LOT
SUGGESTED CMS G CODE MODIFIER DAILY ACTIVITY: CK
PERSONAL GROOMING: A LITTLE
DRESSING REGULAR LOWER BODY CLOTHING: A LOT
TOILETING: A LOT
SUGGESTED CMS G CODE MODIFIER MOBILITY: CN
MOVING TO AND FROM BED TO CHAIR: UNABLE
TURNING FROM BACK TO SIDE WHILE IN FLAT BAD: UNABLE
MOBILITY SCORE: 6
WALKING IN HOSPITAL ROOM: TOTAL
MOVING FROM LYING ON BACK TO SITTING ON SIDE OF FLAT BED: UNABLE
DAILY ACTIVITIY SCORE: 14
CLIMB 3 TO 5 STEPS WITH RAILING: TOTAL
EATING MEALS: A LITTLE
STANDING UP FROM CHAIR USING ARMS: TOTAL
DRESSING REGULAR UPPER BODY CLOTHING: A LOT

## 2022-05-18 ASSESSMENT — FIBROSIS 4 INDEX: FIB4 SCORE: 3.6

## 2022-05-18 ASSESSMENT — GAIT ASSESSMENTS: GAIT LEVEL OF ASSIST: UNABLE TO PARTICIPATE

## 2022-05-18 ASSESSMENT — ACTIVITIES OF DAILY LIVING (ADL): TOILETING: INDEPENDENT

## 2022-05-18 NOTE — PROGRESS NOTES
Report given to ORIN Maya on T7. Patient transported to unit with ORIN Pulido on cardiac monitor with all belongings. Patient has been transferred to T7.

## 2022-05-18 NOTE — PROGRESS NOTES
Cardiology Follow Up Progress Note    Date of Service  5/18/2022    Attending Physician  Shelli Ch M.D.    Chief Complaint   Syncope    DAKOTAH Osorio is a 77 y.o. male admitted 5/17/2022 with HTN, intracranial hemorrhage x2 related to hypertensive emergencies (>200/140s) s/p craniotomy x2 in 2018, obesity, axillary mass found abscess s/p excision presents with syncope. Describes two episodes this year. Both occasions occurred late at night and most recent related to evening bathroom use. Has been off antiplatelets and anticoagulants due to intracranial hemorrhage.    Interim Events  5/18/2022: A fib/flutter, LBBB  Some chest pressure, ECG done, same compared to prior ECG    Review of Systems  Review of Systems   Constitutional: Negative for chills and fever.   Respiratory: Positive for chest tightness. Negative for cough, shortness of breath and wheezing.    Cardiovascular: Negative for chest pain, palpitations and leg swelling.   Gastrointestinal: Negative for nausea and vomiting.   Neurological: Negative for dizziness and headaches.   All other systems reviewed and are negative.      Vital signs in last 24 hours  Temp:  [36 °C (96.8 °F)-37.3 °C (99.2 °F)] 36 °C (96.8 °F)  Pulse:  [65-95] 89  Resp:  [17-21] 18  BP: (103-132)/(51-73) 113/70  SpO2:  [92 %-95 %] 94 %    Physical Exam  Physical Exam  Vitals and nursing note reviewed.   Constitutional:       Appearance: Normal appearance.   HENT:      Head: Normocephalic and atraumatic.      Mouth/Throat:      Mouth: Mucous membranes are moist.   Eyes:      Extraocular Movements: Extraocular movements intact.   Neck:      Comments: No JVD  Cardiovascular:      Rate and Rhythm: Normal rate. Rhythm irregularly irregular.      Pulses: Normal pulses.           Radial pulses are 2+ on the right side and 2+ on the left side.      Heart sounds: Murmur heard.    Systolic murmur is present with a grade of 2/6.     Comments: 1 + BLE edema  Pulmonary:      Effort:  Pulmonary effort is normal.      Breath sounds: Normal breath sounds.   Abdominal:      Palpations: Abdomen is soft.   Musculoskeletal:      Cervical back: Normal range of motion and neck supple.   Skin:     General: Skin is warm and dry.   Neurological:      General: No focal deficit present.      Mental Status: He is alert and oriented to person, place, and time.   Psychiatric:         Mood and Affect: Mood normal.         Behavior: Behavior normal.         Lab Review  Lab Results   Component Value Date/Time    WBC 12.9 (H) 05/18/2022 02:43 AM    RBC 4.57 (L) 05/18/2022 02:43 AM    HEMOGLOBIN 12.9 (L) 05/18/2022 02:43 AM    HEMATOCRIT 41.3 (L) 05/18/2022 02:43 AM    MCV 90.4 05/18/2022 02:43 AM    MCH 28.2 05/18/2022 02:43 AM    MCHC 31.2 (L) 05/18/2022 02:43 AM    MPV 10.9 05/18/2022 02:43 AM      Lab Results   Component Value Date/Time    SODIUM 134 (L) 05/18/2022 02:43 AM    POTASSIUM 3.8 05/18/2022 02:43 AM    CHLORIDE 106 05/18/2022 02:43 AM    CO2 17 (L) 05/18/2022 02:43 AM    GLUCOSE 124 (H) 05/18/2022 02:43 AM    BUN 24 (H) 05/18/2022 02:43 AM    CREATININE 1.36 05/18/2022 02:43 AM    CREATININE 1.1 03/26/2005 03:25 AM      Lab Results   Component Value Date/Time    ASTSGOT 28 05/18/2022 02:43 AM    ALTSGPT 14 05/18/2022 02:43 AM     Lab Results   Component Value Date/Time    CHOLSTRLTOT 148 09/18/2011 07:19 PM    LDL 91 09/18/2011 07:19 PM    HDL 41 09/18/2011 07:19 PM    TRIGLYCERIDE 78 09/18/2011 07:19 PM    TROPONINT 31 (H) 05/17/2022 01:06 PM       No results for input(s): NTPROBNP in the last 72 hours.    Cardiac Imaging and Procedures Review  EKG:  My personal interpretation of the EKG dated 5/18/2022 is Afib with LBBB    Echocardiogram:  5/17/2022  CONCLUSIONS  Prior study 7-5-2010, compared to the report of the prior study, there   has been reduction of LVSF and development of aortic stenosis.   Moderate to severely reduced left ventricular systolic function.  The left ventricular ejection  fraction is visually estimated to be 30%.  Global hypokinesis.  Probable low flow low gradient severe aortic valve stenosis.  Aortic valve area calculated from the continuity equation is 0.7 cm2.  Vmax is 3.9 m/s. Transvalvular gradients are - Peak: 61 mmHg, Mean: 38   mmHg.         Assessment/Plan  1. HFrEF, EF 30%Stage C, Class III  -Start captopril 6.25 3 times daily  - Daily weights, I/Os    2.  Atrial fibrillation  -Metoprolol SR 12.5 mg daily          Thank you for allowing me to participate in the care of this patient.  I will continue to follow this patient    Please contact me with any questions.        CORINNE Mireles  [facility]  [phone]    No future appointments.    Please note that this dictation was created using voice recognition software. I have worked with consultants from the vendor as well as technical experts from [facility] to optimize the interface. I have made every reasonable attempt to correct obvious errors, but I expect that there are errors of grammar and possibly content I did not discover before finalizing the note.    My total time spent caring for the patient on the day of the encounter was 15 minutes. This does not include time spent on separately billable procedures/tests.

## 2022-05-18 NOTE — PROGRESS NOTES
Monitor Summary    Rhythm: Afib/Aflutter  Rate: 88-93  Ectopy: (R) PVC, (R) coup  Measurement: -/.10/-

## 2022-05-18 NOTE — THERAPY
"Occupational Therapy   Initial Evaluation     Patient Name: Sim Osorio  Age:  77 y.o., Sex:  male  Medical Record #: [id number]  Today's Date: 5/18/2022     Precautions  Precautions: (P) Fall Risk    Assessment  Patient is 77 y.o. male who presents to acute after have syncopal episode on bathroom floor. PMH includes HTN, ICH x2, and craniotomy x2. Pt primarily limited by L knee pain X ray negative for fx or subluxation. Pt required max A (x2 people for safety) to sit EOB in preparation for seated ADLs. Pt demo'd impaired balance, functional mobility, and activity tolerance impacting functional independence. Will continue to follow.     Plan    Recommend Occupational Therapy 3 times per week until therapy goals are met for the following treatments:  Adaptive Equipment, Neuro Re-Education / Balance, Self Care/Activities of Daily Living, Therapeutic Activities, and Therapeutic Exercises.    DC Equipment Recommendations: (P) Unable to determine at this time  Discharge Recommendations: (P) Recommend post-acute placement for additional occupational therapy services prior to discharge home     Subjective    \"It's going to hurt, but I will give it a shot\"     Objective       05/18/22 0918   Charge Group   OT Evaluation OT Evaluation Mod   Total Time Spent   OT Time Spent Yes   OT Evaluation (Minutes) 30   OT Total Time Spent (Calculated) 30   Initial Contact Note    Initial Contact Note Order Received and Verified, Occupational Therapy Evaluation in Progress with Full Report to Follow.   Prior Living Situation   Prior Services None   Housing / Facility 1 Story Apartment / Condo  (accessible apartment)   Bathroom Set up Walk In Shower;Shower Chair;Grab Bars   Equipment Owned 4-Wheel Walker;Tub / Shower Seat;Grab Bar(s) By Toilet;Grab Bar(s) In Tub / Shower   Lives with - Patient's Self Care Capacity Spouse   Comments spouse present, very supportive, unable to provide physical assist to pt   Prior Level of ADL Function   Self " Feeding Independent   Grooming / Hygiene Independent   Bathing Independent   Dressing Independent   Toileting Independent   Prior Level of IADL Function   Medication Management Independent   Laundry Independent   Kitchen Mobility Requires Assist   Finances Independent   Home Management Requires Assist   Shopping Independent   Prior Level Of Mobility Independent With Device in Community;Independent With Device in Home   Driving / Transportation Driving Independent   Occupation (Pre-Hospital Vocational) Retired Due To Age   Precautions   Precautions Fall Risk   Vitals   O2 (LPM) 0   O2 Delivery Device None - Room Air   Pain 0 - 10 Group   Therapist Pain Assessment Post Activity Pain Same as Prior to Activity;Nurse Notified  (no c/o pain during session)   Cognition    Cognition / Consciousness WDL   Level of Consciousness Alert   Comments pleasent and cooperative   Active ROM Upper Body   Active ROM Upper Body  WDL   Strength Upper Body   Upper Body Strength  WDL   Coordination Upper Body   Coordination WDL   Balance Assessment   Sitting Balance (Static) Fair -   Sitting Balance (Dynamic) Poor +   Weight Shift Sitting Poor   Comments w/ B UE support   Bed Mobility    Supine to Sit Maximal Assist   Sit to Supine Maximal Assist   Scooting Maximal Assist   Rolling Maximum Assist to Lt.   ADL Assessment   Upper Body Dressing Minimal Assist   Lower Body Dressing Total Assist  (socks and SCDs)   How much help from another person does the patient currently need...   Putting on and taking off regular lower body clothing? 2   Bathing (including washing, rinsing, and drying)? 2   Toileting, which includes using a toilet, bedpan, or urinal? 2   Putting on and taking off regular upper body clothing? 2   Taking care of personal grooming such as brushing teeth? 3   Eating meals? 3   6 Clicks Daily Activity Score 14   Functional Mobility   Sit to Stand Unable to Participate   Mobility sat EOB for ~7 min total   Activity Tolerance    Sitting in Chair NT   Sitting Edge of Bed 7 min   Standing NT   Comments limited by pain   Patient / Family Goals   Patient / Family Goal #1 For the pain to improve   Short Term Goals   Short Term Goal # 1 Pt will demo LB dressing w/ min A   Short Term Goal # 2 Pt will demo BSC txf w/ min A   Short Term Goal # 3 Pt will demo seated grooming w/ SPV   Education Group   Role of Occupational Therapist Patient Response Patient;Acceptance;Explanation;Demonstration;Family;Verbal Demonstration;Action Demonstration   Pathology of Bedrest Patient Response Patient;Acceptance;Explanation;Demonstration;Verbal Demonstration;Action Demonstration   Problem List   Problem List Decreased Active Daily Living Skills;Decreased Homemaking Skills;Decreased Functional Mobility;Decreased Activity Tolerance;Impaired Postural Control / Balance   Anticipated Discharge Equipment and Recommendations   DC Equipment Recommendations Unable to determine at this time   Discharge Recommendations Recommend post-acute placement for additional occupational therapy services prior to discharge home   Interdisciplinary Plan of Care Collaboration   IDT Collaboration with  Nursing;Family / Caregiver;Physical Therapist   Patient Position at End of Therapy In Bed;Bed Alarm On;Tray Table within Reach;Call Light within Reach;Phone within Reach;Family / Friend in Room   Collaboration Comments report given   Session Information   Date / Session Number  5/18, 1 (1/3, 5/24)   Priority 2

## 2022-05-18 NOTE — CARE PLAN
The patient is Watcher - Medium risk of patient condition declining or worsening    Shift Goals  Clinical Goals: Pain control, remain hemodynamically stable  Patient Goals: pain control  Family Goals:  (CRIS)    Progress made toward(s) clinical / shift goals:    Problem: Pain - Standard  Goal: Alleviation of pain or a reduction in pain to the patient’s comfort goal  Outcome: Progressing     Problem: Knowledge Deficit - Standard  Goal: Patient and family/care givers will demonstrate understanding of plan of care, disease process/condition, diagnostic tests and medications  Outcome: Progressing     Problem: Fall Risk  Goal: Patient will remain free from falls  Outcome: Progressing       Patient is not progressing towards the following goals: N/A

## 2022-05-18 NOTE — PROGRESS NOTES
4 Eyes Skin Assessment Completed by ORIN Maya w/ Preceptor Zulema SR and ORIN Pulido.    Head WDL  Ears Redness, Non-blanching  Nose WDL  Mouth WDL  Neck WDL  Breast/Chest Redness and Rash- moisture-associated skin damage under left aguayo  Shoulder Blades Redness, abrasion related to friction on R lateral back  Spine WDL  (R) Arm/Elbow/Hand Redness- phlebitis to AC related to previous PIV  (L) Arm/Elbow/Hand WDL  Abdomen Bruising, scattered. Redness- Moisture-associated skin damage to inner pannus on R side  Groin Redness  Scrotum/Coccyx/Buttocks Redness, Blanching  (R) Leg Scab, lateral to knee   (L) Leg Swelling and Edema located at knee, painful to touch and with movement per patient  (R) Heel/Foot/Toe WDL  (L) Heel/Foot/Toe WDL    Skin grossly intact except for exceptions noted above. See photos in LDAs.    Devices In Places Tele Box, Blood Pressure Cuff, Pulse Ox and SCD's, Oxygen      Interventions In Place TAP System, Pillows and Low Air Loss Mattress, Interdrys, Glasses removed while patient sleeping, silicone oxygen tubing, gray foam pads, Rt AC PIV removed    Possible Skin Injury Yes    Pictures Uploaded Into Epic Yes  Wound Consult Placed N/A  RN Wound Prevention Protocol Ordered Yes

## 2022-05-18 NOTE — CARE PLAN
The patient is Stable - Low risk of patient condition declining or worsening    Shift Goals  Clinical Goals: pain control, tele monitoring  Patient Goals: pain control  Family Goals:  (CRIS)    Progress made toward(s) clinical / shift goals:    Problem: Pain - Standard  Goal: Alleviation of pain or a reduction in pain to the patient’s comfort goal  Outcome: Progressing     Problem: Knowledge Deficit - Standard  Goal: Patient and family/care givers will demonstrate understanding of plan of care, disease process/condition, diagnostic tests and medications  Outcome: Progressing     Problem: Fall Risk  Goal: Patient will remain free from falls  Outcome: Progressing     Problem: Skin Integrity  Goal: Skin integrity is maintained or improved  Outcome: Progressing       Patient is not progressing towards the following goals:

## 2022-05-18 NOTE — PROGRESS NOTES
Received bedside report from NOC RN. Pt is A&Ox4. Pt is resting in bed, no signs of labored breathing, pain to L leg. Pt on 1L of O2. Call light & personal belongings within reach, bed in lowest position & locked, and bed alarm is on. Fall precautions in place and education provided on how to use call light, hourly rounding in place. Educated on calling before getting OOB. Pt updated on plan of care for the shift. Pt declines any additional needs at this time.

## 2022-05-18 NOTE — PROGRESS NOTES
Pt unable to void. Pt attempted to void several times w/o success. Pt reports sensation and urge. Pt bladder scanned per protocol. Bladder scan result [id number].    MD updated.

## 2022-05-18 NOTE — DIETARY
NUTRITION SERVICES: BMI - Pt with BMI >40 (=Body mass index is 42.7 kg/m².), Class III obesity. Weight loss counseling not appropriate in acute care setting. RECOMMEND - If appropriate at DC please refer to outpatient nutrition services for weight management.   RD available PRN.

## 2022-05-18 NOTE — PROGRESS NOTES
Straight cath attempted per order. Unable to straight cath. MD notified.     Per MD jamison ordered.

## 2022-05-18 NOTE — THERAPY
Physical Therapy   Initial Evaluation     Patient Name: Sim Osorio  Age:  77 y.o., Sex:  male  Medical Record #: [id number]  Today's Date: 5/18/2022    Precautions: Fall Risk    Assessment  Patient is a 77 y.o. male admitted following syncopal event and found down on floor. Pt c/o significant L knee pain, XR negative for acute fx and demos OA. Also found to have a fib, HF and AS. Pmhx includes SDH and craniotomy x2 in 2018, axillary mass/abscess s/p excision, HTN and hx of HTN emergencies. Pt seen for PT evaluation at this time. Is limited by L knee pain with even slight PROM or AROM to LLE. Required max A for supine <> sit and tolerated sitting EOB 5 min with BUE support. Unable to place LLE in dependent position. Educated pt and family on importance of sitting EOB daily with nursing to progress activity tolerance to be able to participate more with PT. Provided ice pack at end of session. PT will follow. Currently recommend placement.       Plan  Recommend Physical Therapy 4 times per week until therapy goals are met for the following treatments:  Bed Mobility, Gait Training, Neuro Re-Education / Balance, Self Care/Home Evaluation, Therapeutic Activities, and Therapeutic Exercises  DC Equipment Recommendations: Unable to determine at this time  Discharge Recommendations: Recommend post-acute placement for additional physical therapy services prior to discharge home          05/18/22 0858   Prior Living Situation   Prior Services None   Housing / Facility 1 Story Apartment / Condo (handicap accessible)   Steps Into Home 0   Steps In Home 0   Bathroom Set up Walk In Shower;Shower Chair;Grab Bars   Equipment Owned 4-Wheel Walker;Tub / Shower Seat   Lives with -  Spouse   Comments spouse and dtr present and supportive, wife would not be able to provide physical assist if pt needed   Prior Level of Functional Mobility   Bed Mobility Independent   Transfer Status Independent   Ambulation Independent   Distance  Ambulation (Feet) community distances   Assistive Devices Used 4-Wheel Walker   Cognition    Level of Consciousness Alert   Comments pleasant and participatory despite significant pain   Passive ROM Lower Body   Comments unable to assess LLE d/t significant pain L knee even with L ankle ROM   Active ROM Lower Body    Comments cannot demo flexion or extension L knee d/t pain   Strength Lower Body   Comments MMT NT d/t pain, did not attempt STS and pt unable to tolerate LLE in dependent position   Balance Assessment   Sitting Balance (Static) Fair -   Sitting Balance (Dynamic) Poor +   Weight Shift Sitting Poor   Comments requires BUE support d/t muscle guarding and pain   Gait Analysis   Gait Level Of Assist Unable to Participate   Weight Bearing Status no restrictions   Comments only able to tolerate sitting EOB a few minutes   Bed Mobility    Supine to Sit Maximal Assist   Sit to Supine Maximal Assist   Scooting Maximal Assist   Comments limited by pain and anticipation of pain   Functional Mobility   Sit to Stand Unable to Participate   Bed, Chair, WC Transfer Unable to Participate   Comments edu on importance of EOB daily with nursing to be able to progress with PT   Short Term Goals    Short Term Goal # 1 pt will perform supine <> sit with SPV in 6 visits to get in/out of bed at home   Short Term Goal # 2 pt will sit EOB 5 min without UE support with fair balance in 6 visits to participate in functional tasks   Short Term Goal # 3 pt will perform STS with SPV in 6 visits for improved indep   Short Term Goal # 4 pt will perform SPT with SPV in 6 visits for improved indep   Short Term Goal # 5 pt will ambulate 50ft with FWW and SPV in 6 visits to access home

## 2022-05-18 NOTE — DISCHARGE PLANNING
Care Transition Team Assessment    LSW met with pt at bedside to complete assessment. Pt A&Ox4 and able to verify the information on the face sheet. Pt reported he lives with his wife in a handicap accessible apartment. Prior to this hospitalization pt was independent with all ADLs and IADLs. Pt manages his own medications and drives himself. Pt uses a 4WW at baseline, but no other DME. Pt reported he has a good support system including his wife, daughter, son in law, and grandchildren.    Pt is retired. No financial, SA, or MH concerns. Pt does not have an advance directive. LSW educated pt on advance directives and provided AD packet at bedside.    LSW spoke with pt and wife about SNF. Pt requested additional time to look over SNF list prior to giving choice.    Information Source  Orientation Level: Oriented X4  Information Given By: Patient  Informant's Name: Sim Osorio  Who is responsible for making decisions for patient? : Patient    Readmission Evaluation  Is this a readmission?: No     Elopement Risk  Legal Hold: No  Ambulatory or Self Mobile in Wheelchair: No-Not an Elopement Risk  Elopement Risk: Not at Risk for Elopement    Interdisciplinary Discharge Planning  Lives with - Patient's Self Care Capacity: Spouse  Patient or legal guardian wants to designate a caregiver: No  Housing / Facility: 1 Story Apartment / Condo (accessible apartment)  Prior Services: None    Discharge Preparedness  What is your plan after discharge?: Skilled nursing facility  What are your discharge supports?: Child, Spouse  Prior Functional Level: Ambulatory, Drives Self, Independent with Activities of Daily Living, Independent with Medication Management, Uses Walker  Difficulity with ADLs: None  Difficulity with IADLs: None    Functional Assesment  Prior Functional Level: Ambulatory, Drives Self, Independent with Activities of Daily Living, Independent with Medication Management, Uses Walker    Finances  Financial Barriers to  Discharge: No  Prescription Coverage: Yes    Vision / Hearing Impairment  Vision Impairment : Yes  Right Eye Vision: Wears Glasses  Left Eye Vision: Wears Glasses  Hearing Impairment : Yes  Hearing Impairment: Hearing Device Not Available    Advance Directive  Advance Directive?: None  Advance Directive offered?: AD Booklet given    Domestic Abuse  Have you ever been the victim of abuse or violence?: No  Physical Abuse or Sexual Abuse: No  Verbal Abuse or Emotional Abuse: No  Possible Abuse/Neglect Reported to:: Not Applicable    Psychological Assessment  History of Substance Abuse: None  History of Psychiatric Problems: No  Non-compliant with Treatment: No  Newly Diagnosed Illness: No    Discharge Risks or Barriers  Discharge risks or barriers?: No    Anticipated Discharge Information  Discharge Disposition: D/T to SNF with Medicare cert in anticipation of skilled care (03)

## 2022-05-18 NOTE — PROGRESS NOTES
Received report and assumed care of patient. Patient transferred to T716. Patient is alert and oriented x4. Patient is in no signs of distress. Tele box placed, RR verified. Patient oriented to floor. Patient was updated on the plan of care for the day. Call light within reach, bed in low position, 2 side rails up. All fall precautions in place.

## 2022-05-18 NOTE — CONSULTS
Reason for Consult:  Asked by Dr Shelli Ch M.D. to see this patient with heart failure    CC:   Chief Complaint   Patient presents with   • Syncope     BIB [facility] from home. Pt went to the bathroom and woke up on the floor. Doesn't remember what happened. Doesn't take blood thinners. A&Ox4 GCS15       HPI:      77 year old man with PMH HTN, intracranial hemorrhage x2 related to hypertensive emergencies (>200/140s) s/p craniotomy x2 in 2018, obesity, axillary mass found abscess s/p excision presents with syncope. Describes two episodes this year. Both occasions occurred late at night and most recent related to evening bathroom use. Per patient and his daughter has poor functional capacity and typically walks with walker due to knee and leg weakness. Can only walk about 1/2 block without stopping due to dyspnea they describe. No other cardiac complaints especially no heart failure symptoms. No presyncopal symptoms. This is a chronic issue patient adds. Denies toxic social habits. Has been off antiplatelets and anticoagulants due to intracranial hemorrhage.    Medications / Drug list prior to admission:  No current facility-administered medications on file prior to encounter.     Current Outpatient Medications on File Prior to Encounter   Medication Sig Dispense Refill   • allopurinol (ZYLOPRIM) 300 MG Tab Take 300-450 mg by mouth 2 times a day. 300 mg in the AM  450 mg in the PM     • acetaminophen (TYLENOL) 500 MG Tab Take 500 mg by mouth every morning.     • docusate sodium (COLACE) 100 MG Cap Take 100 mg by mouth every day.     • lisinopril (PRINIVIL) 10 MG Tab Take 1 Tablet by mouth every day. 90 Tablet 3   • hydroCHLOROthiazide (HYDRODIURIL) 25 MG Tab Take 1 Tablet by mouth every day. 90 Tablet 3   • tamsulosin (FLOMAX) 0.4 MG capsule TAKE 1 CAPSULE BY MOUTH 30 MINUTES AFTER BREAKFAST FOR 60 DAYS (Patient taking differently: Take 0.4 mg by mouth every day.) 60 Capsule 0   • prazosin (MINIPRESS) 1 MG Cap Take  1 Capsule by mouth every evening. 1 po at night time 30 Capsule 6       Current list of administered Medications:    Current Facility-Administered Medications:   •  senna-docusate (PERICOLACE or SENOKOT S) 8.6-50 MG per tablet 2 Tablet, 2 Tablet, Oral, BID, 2 Tablet at 05/17/22 1728 **AND** polyethylene glycol/lytes (MIRALAX) PACKET 1 Packet, 1 Packet, Oral, QDAY PRN **AND** magnesium hydroxide (MILK OF MAGNESIA) suspension 30 mL, 30 mL, Oral, QDAY PRN **AND** bisacodyl (DULCOLAX) suppository 10 mg, 10 mg, Rectal, QDAY PRN, Herber Blanton M.D.  •  NS infusion, , Intravenous, Continuous, Herber Blanton M.D., Continue to Floor at 05/17/22 1900  •  acetaminophen (Tylenol) tablet 650 mg, 650 mg, Oral, Q6HRS PRN, Herber Blanton M.D., 650 mg at 05/17/22 0956  •  [DISCONTINUED] piperacillin-tazobactam (ZOSYN) 1 g in  mL IVPB, 1 g, Intravenous, Once **AND** piperacillin-tazobactam (ZOSYN) 3.375 g in  mL IVPB, 3.375 g, Intravenous, Q8HR, Herber Blanton M.D., Continue to Floor at 05/17/22 2056  •  allopurinol (ZYLOPRIM) tablet 300 mg, 300 mg, Oral, QAM, Herber Blanton M.D., 300 mg at 05/17/22 0912  •  [Held by provider] hydroCHLOROthiazide (HYDRODIURIL) tablet 25 mg, 25 mg, Oral, DAILY, Herber Blanton M.D.  •  [Held by provider] lisinopril (PRINIVIL) tablet 10 mg, 10 mg, Oral, DAILY, Herber Blanton M.D.  •  prazosin (MINIPRESS) capsule 1 mg, 1 mg, Oral, QHS, Herber Blanton M.D., 1 mg at 05/17/22 2255  •  tamsulosin (FLOMAX) capsule 0.4 mg, 0.4 mg, Oral, DAILY, Herber Blanton M.D., 0.4 mg at 05/17/22 0900  •  allopurinol (ZYLOPRIM) tablet 450 mg, 450 mg, Oral, Q EVENING, Shelli Ch M.D., 450 mg at 05/17/22 2146  •  heparin injection 5,000 Units, 5,000 Units, Subcutaneous, Q8HRS, Herber Blanton M.D., 5,000 Units at 05/17/22 2146  •  morphine 4 MG/ML injection 0-4 mg, 0-4 mg, Intravenous, Q4HRS PRN, Angelito Muñoz DHEENA, 4 mg at 05/17/22 2011    Past Medical  "History:   Diagnosis Date   • BPH (benign prostatic hypertrophy)    • Heart murmur    • Hypertension    • Renal disorder     kidney stones, enlarged prostrate       Past Surgical History:   Procedure Laterality Date   • HERNIA REPAIR     • OTHER ORTHOPEDIC SURGERY      rt rotator cuff,  leftknee        History reviewed. No pertinent family history.  Patient family history was personally reviewed, no pertinent family history to current presentation    Social History     Socioeconomic History   • Marital status:      Spouse name: Not on file   • Number of children: Not on file   • Years of education: Not on file   • Highest education level: Not on file   Occupational History   • Not on file   Tobacco Use   • Smoking status: Never Smoker   • Smokeless tobacco: Not on file   Vaping Use   • Vaping Use: Never used   Substance and Sexual Activity   • Alcohol use: No   • Drug use: No   • Sexual activity: Not Currently   Other Topics Concern   • Not on file   Social History Narrative   • Not on file     Social Determinants of Health     Financial Resource Strain: Not on file   Food Insecurity: Not on file   Transportation Needs: Not on file   Physical Activity: Not on file   Stress: Not on file   Social Connections: Not on file   Intimate Partner Violence: Not on file   Housing Stability: Not on file       ALLERGIES:  No Known Allergies    Review of systems:  A complete review of symptoms was reviewed with patient. This is reviewed in H&P and PMH. ALL OTHERS reviewed and negative    Physical exam:  Patient Vitals for the past 24 hrs:   BP Temp Temp src Pulse Resp SpO2 Height Weight   05/17/22 2255 112/65 -- -- -- -- -- -- --   05/17/22 2127 115/67 36.8 °C (98.3 °F) Temporal 94 20 93 % -- --   05/17/22 2113 -- -- -- -- -- -- 1.854 m (6' 1\") (!) 147 kg (323 lb 10.2 oz)   05/17/22 1951 118/73 37.3 °C (99.2 °F) Temporal 65 18 -- -- --   05/17/22 1631 109/57 37.1 °C (98.7 °F) Temporal -- 18 92 % -- --   05/17/22 1219 " "103/57 37.1 °C (98.7 °F) Temporal 78 18 92 % 1.854 m (6' 1\") (!) 150 kg (330 lb)   22 1201 132/60 -- -- 77 (!) 21 95 % -- --   22 1142 113/63 -- -- 78 (!) 21 94 % -- --   22 1113 111/66 -- -- 86 (!) 21 92 % -- --   22 1043 117/60 -- -- 78 17 92 % -- --   22 1013 107/51 -- -- 81 20 94 % -- --   22 0939 118/57 -- -- 85 15 94 % -- --   22 0909 (!) 77/51 -- -- 100 20 94 % -- --   22 0853 (!) 88/59 -- -- 86 18 89 % -- --   22 0711 (!) 96/81 -- -- 81 18 93 % -- --   22 0600 106/56 -- -- 73 19 93 % -- --   22 0500 109/62 -- -- 77 17 92 % -- --   22 0400 (!) 99/61 -- -- 78 16 96 % -- --   22 0341 120/60 36.7 °C (98 °F) Temporal 79 15 95 % -- --   22 0314 -- -- -- -- -- -- 1.854 m (6' 1\") (!) 150 kg (330 lb)     General: No acute distress.   EYES: no jaundice  HEENT: OP clear   Neck: No bruits No JVD.   CVS:  irreg irreg. S1 + S2. No M/R/G. 1+ edema.  Resp: CTAB. No wheezing or crackles/rhonchi.  Abdomen: Soft, NT, ND,  Skin: Grossly nothing acute no obvious rashes  Neurological: Alert, Moves all extremities, no cranial nerve defects on limited exam  Extremities: Pulse 2+ in b/l LE. No cyanosis.     Data:  Laboratory studies personally reviewed by me:  Recent Results (from the past 24 hour(s))   EKG (NOW)    Collection Time: 22  3:46 AM   Result Value Ref Range    Report       [facility] Emergency Dept.    Test Date:  2022  Pt Name:    DAVID ANDREA                  Department: ER  MRN:        [id number]                      Room:       [facility]  Gender:     Male                         Technician: [id number]  :        1945                   Requested By:SEE BOLAND  Order #:    [id number]                    Reading MD: SEE BOLAND MD    Measurements  Intervals                                Axis  Rate:       80                           P:  NY:                                      QRS:        " -30  QRSD:       170                          T:          131  QT:         453  QTc:        524    Interpretive Statements  Atrial fibrillation  Left bundle branch block  Compared to ECG 09/19/2011 07:34:43  Sinus bradycardia no longer present  Electronically Signed On 5- 6:55:02 PDT by SEE BOLAND MD     CBC WITH DIFFERENTIAL    Collection Time: 05/17/22  4:47 AM   Result Value Ref Range    WBC 13.2 (H) 4.8 - 10.8 K/uL    RBC 4.87 4.70 - 6.10 M/uL    Hemoglobin 13.8 (L) 14.0 - 18.0 g/dL    Hematocrit 42.8 42.0 - 52.0 %    MCV 87.9 81.4 - 97.8 fL    MCH 28.3 27.0 - 33.0 pg    MCHC 32.2 (L) 33.7 - 35.3 g/dL    RDW 45.7 35.9 - 50.0 fL    Platelet Count 176 164 - 446 K/uL    MPV 10.8 9.0 - 12.9 fL    Neutrophils-Polys 77.60 (H) 44.00 - 72.00 %    Lymphocytes 9.70 (L) 22.00 - 41.00 %    Monocytes 11.70 0.00 - 13.40 %    Eosinophils 0.20 0.00 - 6.90 %    Basophils 0.20 0.00 - 1.80 %    Immature Granulocytes 0.60 0.00 - 0.90 %    Nucleated RBC 0.00 /100 WBC    Neutrophils (Absolute) 10.24 (H) 1.82 - 7.42 K/uL    Lymphs (Absolute) 1.28 1.00 - 4.80 K/uL    Monos (Absolute) 1.54 (H) 0.00 - 0.85 K/uL    Eos (Absolute) 0.02 0.00 - 0.51 K/uL    Baso (Absolute) 0.02 0.00 - 0.12 K/uL    Immature Granulocytes (abs) 0.08 0.00 - 0.11 K/uL    NRBC (Absolute) 0.00 K/uL   PROTHROMBIN TIME    Collection Time: 05/17/22  4:47 AM   Result Value Ref Range    PT 15.2 (H) 12.0 - 14.6 sec    INR 1.24 (H) 0.87 - 1.13   APTT    Collection Time: 05/17/22  4:47 AM   Result Value Ref Range    APTT 33.7 24.7 - 36.0 sec   COMP METABOLIC PANEL    Collection Time: 05/17/22  4:47 AM   Result Value Ref Range    Sodium 135 135 - 145 mmol/L    Potassium 3.3 (L) 3.6 - 5.5 mmol/L    Chloride 104 96 - 112 mmol/L    Co2 18 (L) 20 - 33 mmol/L    Anion Gap 13.0 7.0 - 16.0    Glucose 132 (H) 65 - 99 mg/dL    Bun 30 (H) 8 - 22 mg/dL    Creatinine 1.61 (H) 0.50 - 1.40 mg/dL    Calcium 8.6 8.5 - 10.5 mg/dL    AST(SGOT) 16 12 - 45 U/L    ALT(SGPT) 12 2  - 50 U/L    Alkaline Phosphatase 66 30 - 99 U/L    Total Bilirubin 0.7 0.1 - 1.5 mg/dL    Albumin 3.5 3.2 - 4.9 g/dL    Total Protein 6.9 6.0 - 8.2 g/dL    Globulin 3.4 1.9 - 3.5 g/dL    A-G Ratio 1.0 g/dL   TROPONIN    Collection Time: 22  4:47 AM   Result Value Ref Range    Troponin T 33 (H) 6 - 19 ng/L   ESTIMATED GFR    Collection Time: 22  4:47 AM   Result Value Ref Range    GFR (CKD-EPI) 43 (A) >60 mL/min/1.73 m 2   HEMOGLOBIN AND HEMATOCRIT    Collection Time: 22  8:41 AM   Result Value Ref Range    Hemoglobin 15.1 14.0 - 18.0 g/dL    Hematocrit 46.1 42.0 - 52.0 %   TROPONIN    Collection Time: 22  8:41 AM   Result Value Ref Range    Troponin T 40 (H) 6 - 19 ng/L   MAGNESIUM    Collection Time: 22  8:41 AM   Result Value Ref Range    Magnesium 2.0 1.5 - 2.5 mg/dL   TROPONIN    Collection Time: 22  1:06 PM   Result Value Ref Range    Troponin T 31 (H) 6 - 19 ng/L   EC-ECHOCARDIOGRAM COMPLETE W/ CONT    Collection Time: 22  2:45 PM   Result Value Ref Range    Left Ventrical Ejection Fraction 30    EKG    Collection Time: 22  6:57 PM   Result Value Ref Range    Report       [facility]    Test Date:  2022  Pt Name:    DAVID ANDREA                  Department: [facility]  MRN:        [id number]                      Room:       09  Gender:     Male                         Technician: VARUN  :        1945                   Requested By:SOPHIA ESPINOSA  Order #:    [id number]                    Reading MD:    Measurements  Intervals                                Axis  Rate:       90                           P:  MO:                                      QRS:        -36  QRSD:       160                          T:          127  QT:         404  QTc:        495    Interpretive Statements  ATRIAL FIBRILLATION, V-RATE    LEFT BUNDLE BRANCH BLOCK  Compared to ECG 2022 03:46:55  No significant changes         EKG 22 interpreted by me AF,  LBBB    All pertinent features of laboratory and imaging reviewed including primary images where applicable    TTE 5/17/22  CONCLUSIONS  Prior study 7-5-2010, compared to the report of the prior study, there   has been reduction of LVSF and development of aortic stenosis.   Moderate to severely reduced left ventricular systolic function.  The left ventricular ejection fraction is visually estimated to be 30%.  Global hypokinesis.  Probable low flow low gradient severe aortic valve stenosis.  Aortic valve area calculated from the continuity equation is 0.7 cm2.  Vmax is 3.9 m/s. Transvalvular gradients are - Peak: 61 mmHg, Mean: 38   mmHg.     Principal Problem:    Syncope and collapse POA: Yes  Resolved Problems:    * No resolved hospital problems. *      Assessment / Plan:  77 year old man with PMH HTN, intracranial hemorrhage x2 related to hypertensive emergencies (>200/140s) s/p craniotomy x2 in 2018, obesity, axillary mass found abscess s/p excision presents with syncope found atrial fibrillation, systolic heart failure, and severe AS.    -very limited options for this ill patient mostly related to poor functional status and recurrent intracranial hemorrhage which limit use of antiplatelets or anticoagulation when considering stroke prevention for atrial fibrillation (chadsvasc 4) or workup/possible intervention of potential coronary disease  -please consult neurology for candidacy for doac either temporary or long term  -BP tenuous setting of aortic stenosis and limits GDMT for HF or rate control for AF  -given degree of systolic heart failure and very near severe aortic stenosis parameters is probably safe to assume has severe aortic stenosis. In setting of syncopal events and heart failure the prognosis is poor. Will discuss further with structural cardiology.     I personally discussed his case with Dr Ch    It is my pleasure to participate in the care of Mr. Osorio.  Please do not hesitate to contact  me with questions or concerns.    Austen Bentley MD  Cardiologist [facility]

## 2022-05-18 NOTE — PROGRESS NOTES
While preparing to straight cath pt with coude, pt able to void self. Void 125ml. With 1 episode of incontinents.

## 2022-05-18 NOTE — PROGRESS NOTES
[facility] FAMILY MEDICINE PROGRESS NOTE        Attending:   Dr. Ch    Resident:   Herber Blanton M.D.    PATIENT:   Sim Osorio; [id number]; 1945    ID:   77 y.o. male with a history of subdural hematoma s/p craniotomy in 2018 admitted for syncope workup, and found to have severe aortic stenosis and rate controlled atrial fibrillation.    SUBJECTIVE:   No acute events overnight. The patient is in good spirits this morning, but states that he feels very fatigued and still has abdominal and knee pain with any movement. He says he would like to try another medication for his knee pain. His wife was at bedside and this provider discussed the plan for further cardiology evaluation with the patient and his spouse. The patient stated that he had no other concerns.    OBJECTIVE:  Vitals:    05/17/22 2127 05/17/22 2255 05/18/22 0038 05/18/22 0450   BP: 115/67 112/65 114/70 114/70   Pulse: 94  95 83   Resp: 20 18 18   Temp: 36.8 °C (98.3 °F)  36.3 °C (97.4 °F) 36.3 °C (97.4 °F)   TempSrc: Temporal  Temporal Temporal   SpO2: 93%  95% 93%   Weight:       Height:           Intake/Output Summary (Last 24 hours) at 5/18/2022 0612  Last data filed at 5/18/2022 0508  Gross per 24 hour   Intake 1000 ml   Output 385 ml   Net 615 ml       PHYSICAL EXAM:  General: No acute distress, afebrile, resting comfortably  HEENT: NC/AT. EOMI.   Cardiovascular: heart sounds are distant and difficult to appreciate. Normal capillary refill   Respiratory: CTAB  Abdomen: soft, mild tenderness to palpation in LLQ; no guarding; no rigidity; no masses  EXT:  STAPLES, no edema; tenderness to palpation of medial left knee at area of MCL; no surrounding edema or erythema or warmth  Skin: No erythema/lesions   Neuro: Non-focal    LABS:  Recent Labs     05/17/22  0447 05/17/22  0841 05/18/22  0243   WBC 13.2*  --  12.9*   RBC 4.87  --  4.57*   HEMOGLOBIN 13.8* 15.1 12.9*   HEMATOCRIT 42.8 46.1 41.3*   MCV 87.9  --  90.4   MCH 28.3  --  28.2   RDW 45.7   --  47.6   PLATELETCT 176  --  164   MPV 10.8  --  10.9   NEUTSPOLYS 77.60*  --  76.30*   LYMPHOCYTES 9.70*  --  7.40*   MONOCYTES 11.70  --  15.00*   EOSINOPHILS 0.20  --  0.20   BASOPHILS 0.20  --  0.20     Recent Labs     05/17/22 0447 05/17/22  0841 05/18/22  0243   SODIUM 135  --  134*   POTASSIUM 3.3*  --  3.8   CHLORIDE 104  --  106   CO2 18*  --  17*   BUN 30*  --  24*   CREATININE 1.61*  --  1.36   CALCIUM 8.6  --  8.3*   MAGNESIUM  --  2.0  --    ALBUMIN 3.5  --  3.2     Estimated GFR/CRCL = Estimated Creatinine Clearance: 66.7 mL/min (by C-G formula based on SCr of 1.36 mg/dL).  Recent Labs     05/17/22 0447 05/18/22  0243   GLUCOSE 132* 124*     Recent Labs     05/17/22 0447 05/18/22  0243   ASTSGOT 16 28   ALTSGPT 12 14   TBILIRUBIN 0.7 0.9   ALKPHOSPHAT 66 61   GLOBULIN 3.4 3.5   INR 1.24*  --              Recent Labs     05/17/22 0447   INR 1.24*   APTT 33.7         IMAGING:  EC-ECHOCARDIOGRAM COMPLETE W/ CONT   Final Result      DX-KNEE 2- LEFT   Final Result      1. Mild anterolateral soft tissue swelling. No fracture, subluxation or joint effusion.   2. Tricompartmental left knee osteoarthritis, Kellgren Cam grade 4 involving the medial knee joint compartment.      CT-TSPINE W/O PLUS RECONS   Final Result         1.  No acute traumatic bony injury of the thoracic spine.   2.  Atherosclerosis and atherosclerotic coronary artery disease      CT-LSPINE W/O PLUS RECONS   Final Result         1.  No acute traumatic bony injury of the lumbar spine. Multilevel degenerative changes of lumbar spine diminish diagnostic sensitivity of this exam.   2.  Diverticulosis with hazy fat stranding adjacent to sigmoid colon suggesting changes of sigmoid diverticulitis.   3.  Atherosclerosis      CT-HEAD W/O   Final Result         1.  No acute intracranial abnormality.   2.  Left maxillary sinusitis changes   3.  Atherosclerosis.             MEDS:  Current Facility-Administered Medications   Medication Last  Admin   • senna-docusate (PERICOLACE or SENOKOT S) 8.6-50 MG per tablet 2 Tablet 2 Tablet at 05/18/22 0510    And   • polyethylene glycol/lytes (MIRALAX) PACKET 1 Packet      And   • magnesium hydroxide (MILK OF MAGNESIA) suspension 30 mL      And   • bisacodyl (DULCOLAX) suppository 10 mg     • acetaminophen (Tylenol) tablet 650 mg 650 mg at 05/17/22 0956   • piperacillin-tazobactam (ZOSYN) 3.375 g in  mL IVPB Continue to Floor at 05/17/22 2056   • allopurinol (ZYLOPRIM) tablet 300 mg 300 mg at 05/18/22 0510   • [Held by provider] hydroCHLOROthiazide (HYDRODIURIL) tablet 25 mg     • [Held by provider] lisinopril (PRINIVIL) tablet 10 mg     • prazosin (MINIPRESS) capsule 1 mg 1 mg at 05/17/22 2255   • tamsulosin (FLOMAX) capsule 0.4 mg 0.4 mg at 05/18/22 0510   • allopurinol (ZYLOPRIM) tablet 450 mg 450 mg at 05/17/22 2146   • heparin injection 5,000 Units 5,000 Units at 05/18/22 0510   • morphine 4 MG/ML injection 0-4 mg 4 mg at 05/17/22 2011       ASSESSMENT/PLAN:  This is a 77 year old male with history of  subdural hematoma s/p craniotomy in 2018, hypertension, BPH, and gout who presented after a syncopal event. He describes a history in which he has been constipated and dehydrated over the last few days, pointing towards reflex or orthostatic syncope; however, he also has atrial fibrillation on EKG. Additionally, he takes prazosin, lisinopril, and hydrochlorothiazide, which could also have led to the presentation of this episode due to their side effects. Given his history of subdural hematoma, probable paroxysmal atrial fibrillation, diverticulitis at advanced age and Blanco syncope score, he has been admitted for further evaluation and treatment.     #Syncopal episode  #H/o subdural hematoma s/p craniotomy in 2018  #Severe, probable D1 grade, aortic stenosis  -CT head did not show acute bleed  -Cranial nerve exam at admission was WNL and he continues to have no focal deficits  -echocardiogram  shows severe, probable low flow/low gradient aortic stenosis with EF of 30%  -Cardiology consulted, recommended discussing case with neurology for possibility of starting DOAC  -Neurology reviewed case, confirmed that DOAC could be started in this patient  -Recommendations of cardiology and neurology greatly appreciated  Plan:  -Fall precautions  -Awaiting structural cardiology evaluation and further eval for consideration of initiating DOAC  -Low threshold for repeat CT head  -MIVF @ 125 ml/hr  -Continue telemetry monitoring     #Diverticulitis  -CT a/p shows diverticula with hazy fat stranding adjacent to the sigmoid colon  -Patient received a dose of zosyn in the ED  Plan:  -Continue full liquid diet  -Bowel prophylaxis regimen  -Continue maintenance IV fluids  -Zosyn 4.5g Q6hrs started on 5/17/21  -CBC tomorrow am  -If patient is less symptomatic by tomorrow, will transition to PO antibiotics     #Stage 3b CKD  #Hypotension  #Hypokalemia  -GFR 43 at admission, with BUN/Cr 30/1.61  -Likely prerenal due to dehydration, but there is possibility of obstruction due to BPH  Plan:  -Bladder scan protocol  -IV fluids at 125ml/hr  -Continue BPH medications  -Renal US if worsening  -BMP tomorrow AM  -Monitor outpatient; if has >5 gfr decrease per year or gfr <30, refer to nephrology  -Avoid nephrotoxins, renally dose medications  -Heparin DVT ppx     #Atrial fibrillation  #Elevated troponin  -EKG in ED shows afib with rate 80  -Patient asymptomatic  -Troponin 33, with downtrend and no need for further testing  -ChadsVasc score: 4  Plan:  -Pending structural cardiology evaluation  -Telemetry monitoring  -Discuss anticoagulation risks and benefits prior to discharge     #Left knee pain  -Osteoarthritis on x-ray imaging without fracture or subluxation  Plan:  -PRN tylenol, oxycodone, Voltaren, and for breathrough dilaudid  -PT/OT     #BPH  -Continue home prazosin and tamsulosin  -Patient should have outpatient follow up  regarding his prazosin, as it could contribute to his near syncopal episodes that he states he has fairly frequently     #Hypertension  -Holding lisinopril and HCTZ home doses  -Patient should have outpatient follow up regarding his ACEI and HCTZ, as they could contribute to his near syncopal episodes that he states he has fairly frequently     Core Measures:   Fluids: NS@125 mL/hour  Lines: PIV  Abx: Zosyn started 5/17/2022  DVT prophylaxis: heparin  Code Status: Full code     Disposition: Inpatient for IV antibiotics and consult from structural cardiology    Herber Blanton MD   PGY-2 Family Medicine Resident   [facility]

## 2022-05-19 LAB
ALBUMIN SERPL BCP-MCNC: 3.2 G/DL (ref 3.2–4.9)
ALBUMIN/GLOB SERPL: 1 G/DL
ALP SERPL-CCNC: 65 U/L (ref 30–99)
ALT SERPL-CCNC: 15 U/L (ref 2–50)
ANION GAP SERPL CALC-SCNC: 10 MMOL/L (ref 7–16)
AST SERPL-CCNC: 21 U/L (ref 12–45)
BASOPHILS # BLD AUTO: 0.2 % (ref 0–1.8)
BASOPHILS # BLD: 0.02 K/UL (ref 0–0.12)
BILIRUB SERPL-MCNC: 0.9 MG/DL (ref 0.1–1.5)
BUN SERPL-MCNC: 22 MG/DL (ref 8–22)
CALCIUM SERPL-MCNC: 8.2 MG/DL (ref 8.5–10.5)
CHLORIDE SERPL-SCNC: 100 MMOL/L (ref 96–112)
CO2 SERPL-SCNC: 20 MMOL/L (ref 20–33)
CREAT SERPL-MCNC: 1.56 MG/DL (ref 0.5–1.4)
EOSINOPHIL # BLD AUTO: 0.06 K/UL (ref 0–0.51)
EOSINOPHIL NFR BLD: 0.5 % (ref 0–6.9)
ERYTHROCYTE [DISTWIDTH] IN BLOOD BY AUTOMATED COUNT: 46 FL (ref 35.9–50)
GFR SERPLBLD CREATININE-BSD FMLA CKD-EPI: 45 ML/MIN/1.73 M 2
GLOBULIN SER CALC-MCNC: 3.1 G/DL (ref 1.9–3.5)
GLUCOSE SERPL-MCNC: 132 MG/DL (ref 65–99)
HCT VFR BLD AUTO: 39.6 % (ref 42–52)
HGB BLD-MCNC: 12.9 G/DL (ref 14–18)
IMM GRANULOCYTES # BLD AUTO: 0.14 K/UL (ref 0–0.11)
IMM GRANULOCYTES NFR BLD AUTO: 1.2 % (ref 0–0.9)
LYMPHOCYTES # BLD AUTO: 0.85 K/UL (ref 1–4.8)
LYMPHOCYTES NFR BLD: 7 % (ref 22–41)
MCH RBC QN AUTO: 29.1 PG (ref 27–33)
MCHC RBC AUTO-ENTMCNC: 32.6 G/DL (ref 33.7–35.3)
MCV RBC AUTO: 89.2 FL (ref 81.4–97.8)
MONOCYTES # BLD AUTO: 2.04 K/UL (ref 0–0.85)
MONOCYTES NFR BLD AUTO: 16.8 % (ref 0–13.4)
NEUTROPHILS # BLD AUTO: 9.04 K/UL (ref 1.82–7.42)
NEUTROPHILS NFR BLD: 74.3 % (ref 44–72)
NRBC # BLD AUTO: 0 K/UL
NRBC BLD-RTO: 0 /100 WBC
PLATELET # BLD AUTO: 170 K/UL (ref 164–446)
PMV BLD AUTO: 10.7 FL (ref 9–12.9)
POTASSIUM SERPL-SCNC: 3.8 MMOL/L (ref 3.6–5.5)
PROT SERPL-MCNC: 6.3 G/DL (ref 6–8.2)
RBC # BLD AUTO: 4.44 M/UL (ref 4.7–6.1)
SODIUM SERPL-SCNC: 130 MMOL/L (ref 135–145)
UFH PPP CHRO-ACNC: 0.34 IU/ML (ref 0–9999)
UFH PPP CHRO-ACNC: 0.38 IU/ML (ref 0–9999)
WBC # BLD AUTO: 12.2 K/UL (ref 4.8–10.8)

## 2022-05-19 PROCEDURE — 700102 HCHG RX REV CODE 250 W/ 637 OVERRIDE(OP): Performed by: FAMILY MEDICINE

## 2022-05-19 PROCEDURE — 80053 COMPREHEN METABOLIC PANEL: CPT

## 2022-05-19 PROCEDURE — 700102 HCHG RX REV CODE 250 W/ 637 OVERRIDE(OP): Performed by: STUDENT IN AN ORGANIZED HEALTH CARE EDUCATION/TRAINING PROGRAM

## 2022-05-19 PROCEDURE — 99233 SBSQ HOSP IP/OBS HIGH 50: CPT | Mod: GC | Performed by: FAMILY MEDICINE

## 2022-05-19 PROCEDURE — 700105 HCHG RX REV CODE 258: Performed by: STUDENT IN AN ORGANIZED HEALTH CARE EDUCATION/TRAINING PROGRAM

## 2022-05-19 PROCEDURE — 770020 HCHG ROOM/CARE - TELE (206)

## 2022-05-19 PROCEDURE — 700102 HCHG RX REV CODE 250 W/ 637 OVERRIDE(OP): Performed by: PHYSICIAN ASSISTANT

## 2022-05-19 PROCEDURE — 99233 SBSQ HOSP IP/OBS HIGH 50: CPT | Mod: FS | Performed by: INTERNAL MEDICINE

## 2022-05-19 PROCEDURE — A9270 NON-COVERED ITEM OR SERVICE: HCPCS | Performed by: NURSE PRACTITIONER

## 2022-05-19 PROCEDURE — 36415 COLL VENOUS BLD VENIPUNCTURE: CPT

## 2022-05-19 PROCEDURE — 85025 COMPLETE CBC W/AUTO DIFF WBC: CPT

## 2022-05-19 PROCEDURE — 700102 HCHG RX REV CODE 250 W/ 637 OVERRIDE(OP): Performed by: NURSE PRACTITIONER

## 2022-05-19 PROCEDURE — 85520 HEPARIN ASSAY: CPT | Mod: 91

## 2022-05-19 PROCEDURE — A9270 NON-COVERED ITEM OR SERVICE: HCPCS | Performed by: FAMILY MEDICINE

## 2022-05-19 PROCEDURE — 700111 HCHG RX REV CODE 636 W/ 250 OVERRIDE (IP): Performed by: STUDENT IN AN ORGANIZED HEALTH CARE EDUCATION/TRAINING PROGRAM

## 2022-05-19 PROCEDURE — A9270 NON-COVERED ITEM OR SERVICE: HCPCS | Performed by: PHYSICIAN ASSISTANT

## 2022-05-19 PROCEDURE — A9270 NON-COVERED ITEM OR SERVICE: HCPCS | Performed by: STUDENT IN AN ORGANIZED HEALTH CARE EDUCATION/TRAINING PROGRAM

## 2022-05-19 RX ORDER — LISINOPRIL 5 MG/1
2.5 TABLET ORAL EVERY EVENING
Status: DISCONTINUED | OUTPATIENT
Start: 2022-05-19 | End: 2022-05-20

## 2022-05-19 RX ORDER — HYDROMORPHONE HYDROCHLORIDE 1 MG/ML
0.5 INJECTION, SOLUTION INTRAMUSCULAR; INTRAVENOUS; SUBCUTANEOUS EVERY 4 HOURS PRN
Status: DISCONTINUED | OUTPATIENT
Start: 2022-05-19 | End: 2022-05-20

## 2022-05-19 RX ORDER — LORAZEPAM 2 MG/ML
1 INJECTION INTRAMUSCULAR ONCE
Status: ACTIVE | OUTPATIENT
Start: 2022-05-19 | End: 2022-05-20

## 2022-05-19 RX ADMIN — ALLOPURINOL 450 MG: 300 TABLET ORAL at 21:28

## 2022-05-19 RX ADMIN — HYDROMORPHONE HYDROCHLORIDE 0.5 MG: 1 INJECTION, SOLUTION INTRAMUSCULAR; INTRAVENOUS; SUBCUTANEOUS at 11:18

## 2022-05-19 RX ADMIN — HEPARIN SODIUM 18 UNITS/KG/HR: 5000 INJECTION, SOLUTION INTRAVENOUS at 21:23

## 2022-05-19 RX ADMIN — OXYCODONE HYDROCHLORIDE 10 MG: 10 TABLET ORAL at 19:05

## 2022-05-19 RX ADMIN — CAPTOPRIL 6.25 MG: 12.5 TABLET ORAL at 06:06

## 2022-05-19 RX ADMIN — ALLOPURINOL 300 MG: 300 TABLET ORAL at 06:06

## 2022-05-19 RX ADMIN — OXYCODONE HYDROCHLORIDE 10 MG: 10 TABLET ORAL at 10:05

## 2022-05-19 RX ADMIN — OXYCODONE HYDROCHLORIDE 10 MG: 10 TABLET ORAL at 14:14

## 2022-05-19 RX ADMIN — HEPARIN SODIUM 18 UNITS/KG/HR: 5000 INJECTION, SOLUTION INTRAVENOUS at 06:52

## 2022-05-19 RX ADMIN — TAMSULOSIN HYDROCHLORIDE 0.4 MG: 0.4 CAPSULE ORAL at 06:06

## 2022-05-19 RX ADMIN — PIPERACILLIN AND TAZOBACTAM 3.38 G: 3; .375 INJECTION, POWDER, LYOPHILIZED, FOR SOLUTION INTRAVENOUS; PARENTERAL at 14:14

## 2022-05-19 RX ADMIN — HYDROMORPHONE HYDROCHLORIDE 0.5 MG: 1 INJECTION, SOLUTION INTRAMUSCULAR; INTRAVENOUS; SUBCUTANEOUS at 05:40

## 2022-05-19 RX ADMIN — PIPERACILLIN AND TAZOBACTAM 3.38 G: 3; .375 INJECTION, POWDER, LYOPHILIZED, FOR SOLUTION INTRAVENOUS; PARENTERAL at 05:51

## 2022-05-19 RX ADMIN — LISINOPRIL 2.5 MG: 5 TABLET ORAL at 17:36

## 2022-05-19 RX ADMIN — HYDROMORPHONE HYDROCHLORIDE 0.5 MG: 1 INJECTION, SOLUTION INTRAMUSCULAR; INTRAVENOUS; SUBCUTANEOUS at 16:59

## 2022-05-19 RX ADMIN — METOPROLOL SUCCINATE 12.5 MG: 25 TABLET, EXTENDED RELEASE ORAL at 06:06

## 2022-05-19 ASSESSMENT — ENCOUNTER SYMPTOMS
SHORTNESS OF BREATH: 0
FALLS: 0
LOSS OF CONSCIOUSNESS: 1
ORTHOPNEA: 0
PALPITATIONS: 0
NAUSEA: 0
ROS GI COMMENTS: NO ABDOMINAL BLOATING, NO EARLY SATIETY
DIZZINESS: 0
HEADACHES: 0
PND: 0

## 2022-05-19 ASSESSMENT — PAIN DESCRIPTION - PAIN TYPE
TYPE: ACUTE PAIN

## 2022-05-19 ASSESSMENT — FIBROSIS 4 INDEX: FIB4 SCORE: 2.52

## 2022-05-19 NOTE — DISCHARGE PLANNING
Case Management Discharge Planning    Admission Date: 5/17/2022  GMLOS: 3.3  ALOS: 2    6-Clicks ADL Score: 14  6-Clicks Mobility Score: 6  PT and/or OT Eval ordered: Yes  Post-acute Referrals Ordered: Yes  Post-acute Choice Obtained: Yes  Has referral(s) been sent to post-acute provider:  Yes      Anticipated Discharge Dispo: Discharge Disposition: D/T to SNF with Medicare cert in anticipation of skilled care (03)    DME Needed: No    Action(s) Taken: Spoke with patient and wife at the bedside.  Discussed SNF placement post discharge and pt is in agreement with plan.  Discussed facility choice and pt/wife picked several possible facilities.  Choice form faxed to Subha CASTILLO.  Referrals sent to all facilities contracted with [facility]    Escalations Completed: SNF referral sent    Medically Clear: No    Next Steps: Follow up on referrals and plan of care    Barriers to Discharge: None    Is the patient up for discharge tomorrow: No

## 2022-05-19 NOTE — PROGRESS NOTES
[facility] Cardiology Progress Note    Name:   Sim Osorio   YOB: 1945  Age:   77 y.o.  male   MRN:   [id number]    Consulting Cardiologist: Dr. Bentley    Chief Complaint: Syncope    History of Present Illness:  Sim is a 78yo male without any known cardiac problems, history of intracranial hemorrhage 2018 who had 5 days of flu-like symptoms and diarrhea, then on 5/17/22 when he was in the bathroom he syncopized, his wife found him on the ground and he was brought to [facility] for evaluation. This admission his echocardiogram showed a reduced EF of 30% with global hypokinesis, probable low flow low gradient severe aortic valve stenosis,  aortic valve area calculated from the continuity equation is 0.7 cm2,  Vmax is 3.9 m/s. Transvalvular gradients are - Peak: 61 mmHg, Mean: 38 mmHg. He was in atrial fibrillation, rate controlled, with known LBBB. This admission he has been started on anticoagulation after discussing case with neurology.     Interim Events:   Sim is laying in bed in a lot of pain from knee spasms, he still generally does not feel well and is recovering from a viral illness. He does not have orthopnea, PND, leg swelling. He is not requiring oxygen while laying in bed.   He had reported chest pain yesterday which resolved after few hours, he does not have any chest pain or pressure this morning.    On telemetry he is in atrial fibrillation with rates <100 bpm.    With regards to his syncopal episode.  He had had several days of diarrhea and general malaise.  He has been getting in and out of bed all night to use the bathroom he did not specifically say he felt lightheaded or dizziness upon standing.  He did not have any palpitations or chest pain prior to the syncopal episode.  This is the first time he has ever had a syncopal episode.    Prior to this viral illness he had been in his usual state of health, he uses a walker for ambulation and lives with his wife in a handicap  apartment.  He does get short of breath upon mild exertion such as walking from one end of the apartment to the other.  He does not recall having chest pain prior to the admission.  He has never been told about aortic stenosis or atrial fibrillation.      Review of Systems   Constitutional: Negative for malaise/fatigue.   Respiratory: Negative for shortness of breath.    Cardiovascular: Negative for chest pain, palpitations, orthopnea, leg swelling and PND.   Gastrointestinal: Negative for nausea.        No abdominal bloating, no early satiety   Musculoskeletal: Negative for falls.   Neurological: Positive for loss of consciousness. Negative for dizziness and headaches.        No lightheadedness          Past Surgical History:   Procedure Laterality Date   • HERNIA REPAIR     • OTHER ORTHOPEDIC SURGERY      rt rotator cuff,  leftknee        History reviewed. No pertinent family history.    Social History     Tobacco Use   Smoking Status Never Smoker   Smokeless Tobacco Not on file       No Known Allergies      Medications   No current facility-administered medications on file prior to encounter.     Current Outpatient Medications on File Prior to Encounter   Medication Sig Dispense Refill   • allopurinol (ZYLOPRIM) 300 MG Tab Take 300-450 mg by mouth 2 times a day. 300 mg in the AM  450 mg in the PM     • acetaminophen (TYLENOL) 500 MG Tab Take 500 mg by mouth every morning.     • docusate sodium (COLACE) 100 MG Cap Take 100 mg by mouth every day.     • lisinopril (PRINIVIL) 10 MG Tab Take 1 Tablet by mouth every day. 90 Tablet 3   • hydroCHLOROthiazide (HYDRODIURIL) 25 MG Tab Take 1 Tablet by mouth every day. 90 Tablet 3   • tamsulosin (FLOMAX) 0.4 MG capsule TAKE 1 CAPSULE BY MOUTH 30 MINUTES AFTER BREAKFAST FOR 60 DAYS (Patient taking differently: Take 0.4 mg by mouth every day.) 60 Capsule 0   • prazosin (MINIPRESS) 1 MG Cap Take 1 Capsule by mouth every evening. 1 po at night time 30 Capsule 6            Physical Exam  Vitals:    05/19/22 1008   BP:    Pulse:    Resp:    Temp:    SpO2: 95%     Physical Exam  Vitals reviewed.   Constitutional:       Appearance: Normal appearance.   HENT:      Head: Normocephalic and atraumatic.   Cardiovascular:      Rate and Rhythm: Normal rate. Rhythm irregular.      Heart sounds: Murmur heard.      Comments: Distant heart sounds but noted to have systolic murmur at right sternal border  Pulmonary:      Effort: Pulmonary effort is normal. No respiratory distress.      Breath sounds: No rales.   Abdominal:      General: There is no distension.   Skin:     General: Skin is dry.   Neurological:      Mental Status: He is alert. Mental status is at baseline.   Psychiatric:         Judgment: Judgment normal.         Labs (personally reviewed):     Lab Results   Component Value Date/Time    SODIUM 130 (L) 05/19/2022 01:01 AM    POTASSIUM 3.8 05/19/2022 01:01 AM    CHLORIDE 100 05/19/2022 01:01 AM    CO2 20 05/19/2022 01:01 AM    GLUCOSE 132 (H) 05/19/2022 01:01 AM    BUN 22 05/19/2022 01:01 AM    CREATININE 1.56 (H) 05/19/2022 01:01 AM    CREATININE 1.1 03/26/2005 03:25 AM     Lab Results   Component Value Date/Time    CHOLSTRLTOT 148 09/18/2011 07:19 PM    LDL 91 09/18/2011 07:19 PM    HDL 41 09/18/2011 07:19 PM    TRIGLYCERIDE 78 09/18/2011 07:19 PM     Lab Results   Component Value Date/Time    BNPBTYPENAT 30 09/18/2011 06:20 PM         Cardiac Imaging and Procedures Review:      Personal Telemetry Review: AF rates <100, BBB      Echo 5/17/22:  CONCLUSIONS  Prior study 7-5-2010, compared to the report of the prior study, there   has been reduction of LVSF and development of aortic stenosis.   Moderate to severely reduced left ventricular systolic function.  The left ventricular ejection fraction is visually estimated to be 30%.  Global hypokinesis.  Probable low flow low gradient severe aortic valve stenosis.  Aortic valve area calculated from the continuity equation is 0.7  cm2.  Vmax is 3.9 m/s. Transvalvular gradients are - Peak: 61 mmHg, Mean: 38 mmHg.          Assessment and Medical Decision Making:  Syncope  Aortic Stenosis, likely low flow low gradient severe,   Valvular Heart failure with reduced EF  - he is comfortable in bed without supplemental oxygen, he is likely a little hypervolemic but will avoid diuresis in the setting of his aortic stenosis unless he becomes symptomatic  - metop SR 12.5mg  - captopril 6.25mg tid, tolerating well--> can transition to lisinopril 2.5mg daily. avoid hypotension in this patient  - Sim and his family have decided they would like to proceed with available diagnostic testing and procedures for his valve. With the contrast shortage his TAVR work up may be delayed but we will discuss case with the TAVR team  - he needs an outpatient heart monitor to screen for VT given his syncope    Atrial Fibrillation, new diagnosis  - suspect this is a chronic finding. He is well rate controlled on Metop SR 12.5mg, avoid too much beta blockade/negative inotropes in this patient  - Primary team to transition to oral anticoagulation closer to discharge    Please see Dr. Bentley's attestation for additions and further recommendations.    Joyce Peacock PA-C  [facility]    I personally spent a total of 22 minutes which includes face-to-face time and non-face-to-face time spent on preparing to see the patient, reviewing hospital notes and tests, obtaining history from the patient, performing a medically appropriate exam, counseling and educating the patient, ordering medications/tests/procedures/referrals as clinically indicated, and documenting information in the electronic medical record.

## 2022-05-19 NOTE — PROGRESS NOTES
"[facility] FAMILY MEDICINE PROGRESS NOTE        Attending:   Dr. Puente      Resident:   Herber Blanton M.D.    PATIENT:   Sim Osorio; [id number]; 1945    ID:   77 y.o. male with a history of subdural hematoma s/p craniotomy in 2018 admitted for syncope workup, and found to have severe aortic stenosis and rate controlled atrial fibrillation.    SUBJECTIVE:   No acute events overnight.  Patient states this morning that his left leg is really bothering him.  He says that the pain is not usually present, but he will suddenly have \"spasms\" of pain in the right medial knee.  He says he has not had any chest pain or shortness of breath.  Per the patient's daughter, the knee looks more swollen to her than it did yesterday.  Patient was updated on the plan of care to await further cardiology evaluation, and all questions were answered.    OBJECTIVE:  Vitals:    05/18/22 1941 05/18/22 2138 05/18/22 2358 05/19/22 0344   BP: 105/59 (!) 98/61 106/61 104/61   Pulse: 82  69 68   Resp: 18 18 18   Temp: 36.7 °C (98.1 °F)  36.3 °C (97.4 °F) 36 °C (96.8 °F)   TempSrc: Temporal  Temporal Temporal   SpO2:   95% 96%   Weight: (!) 148 kg (326 lb 11.6 oz)      Height:           Intake/Output Summary (Last 24 hours) at 5/19/2022 0725  Last data filed at 5/19/2022 0600  Gross per 24 hour   Intake --   Output 350 ml   Net -350 ml       PHYSICAL EXAM:  General: No acute distress, afebrile, resting comfortably, elevated BMI  HEENT: NC/AT. EOMI.   Cardiovascular: Irregularly irregular without murmurs. Normal capillary refill   Respiratory: CTAB  Abdomen: soft, nontender, nondistended, large abdominal habitus, no masses  EXT:  STAPLES  Left lower extremity: Tender to palpation at level of medial knee joint; mildly edematous compared to right lower extremity; no erythema; fluid is not ballotable beneath the patella; positive Homans' sign  Right lower extremity: Unremarkable  Skin: No erythema/lesions on exposed skin  Neuro: " Non-focal    LABS:  Recent Labs     05/17/22 0447 05/17/22 0841 05/18/22 0243 05/19/22  0101   WBC 13.2*  --  12.9* 12.2*   RBC 4.87  --  4.57* 4.44*   HEMOGLOBIN 13.8* 15.1 12.9* 12.9*   HEMATOCRIT 42.8 46.1 41.3* 39.6*   MCV 87.9  --  90.4 89.2   MCH 28.3  --  28.2 29.1   RDW 45.7  --  47.6 46.0   PLATELETCT 176  --  164 170   MPV 10.8  --  10.9 10.7   NEUTSPOLYS 77.60*  --  76.30* 74.30*   LYMPHOCYTES 9.70*  --  7.40* 7.00*   MONOCYTES 11.70  --  15.00* 16.80*   EOSINOPHILS 0.20  --  0.20 0.50   BASOPHILS 0.20  --  0.20 0.20     Recent Labs     05/17/22 0447 05/17/22 0841 05/18/22 0243 05/19/22  0101   SODIUM 135  --  134* 130*   POTASSIUM 3.3*  --  3.8 3.8   CHLORIDE 104  --  106 100   CO2 18*  --  17* 20   BUN 30*  --  24* 22   CREATININE 1.61*  --  1.36 1.56*   CALCIUM 8.6  --  8.3* 8.2*   MAGNESIUM  --  2.0  --   --    ALBUMIN 3.5  --  3.2 3.2     Estimated GFR/CRCL = Estimated Creatinine Clearance: 58.1 mL/min (A) (by C-G formula based on SCr of 1.56 mg/dL (H)).  Recent Labs     05/17/22 0447 05/18/22 0243 05/19/22  0101   GLUCOSE 132* 124* 132*     Recent Labs     05/17/22 0447 05/18/22 0243 05/18/22  1641 05/19/22  0101   ASTSGOT 16 28  --  21   ALTSGPT 12 14  --  15   TBILIRUBIN 0.7 0.9  --  0.9   ALKPHOSPHAT 66 61  --  65   GLOBULIN 3.4 3.5  --  3.1   INR 1.24*  --  1.35*  --              Recent Labs     05/17/22  0447 05/18/22  1641   INR 1.24* 1.35*   APTT 33.7 43.7*         IMAGING:  EC-ECHOCARDIOGRAM COMPLETE W/ CONT   Final Result      DX-KNEE 2- LEFT   Final Result      1. Mild anterolateral soft tissue swelling. No fracture, subluxation or joint effusion.   2. Tricompartmental left knee osteoarthritis, Kellgren Cam grade 4 involving the medial knee joint compartment.      CT-TSPINE W/O PLUS RECONS   Final Result         1.  No acute traumatic bony injury of the thoracic spine.   2.  Atherosclerosis and atherosclerotic coronary artery disease      CT-LSPINE W/O PLUS RECONS   Final  Result         1.  No acute traumatic bony injury of the lumbar spine. Multilevel degenerative changes of lumbar spine diminish diagnostic sensitivity of this exam.   2.  Diverticulosis with hazy fat stranding adjacent to sigmoid colon suggesting changes of sigmoid diverticulitis.   3.  Atherosclerosis      CT-HEAD W/O   Final Result         1.  No acute intracranial abnormality.   2.  Left maxillary sinusitis changes   3.  Atherosclerosis.             MEDS:  Current Facility-Administered Medications   Medication Last Admin   • captopril (CAPOTEN) tablet 6.25 mg 6.25 mg at 05/19/22 0606   • nitroglycerin (NITROSTAT) tablet 0.4 mg     • oxyCODONE immediate-release (ROXICODONE) tablet 5 mg 5 mg at 05/18/22 2333    Or   • oxyCODONE immediate release (ROXICODONE) tablet 10 mg     • metoprolol SR (TOPROL XL) tablet 12.5 mg 12.5 mg at 05/19/22 0606   • HYDROmorphone (Dilaudid) injection 0.5 mg 0.5 mg at 05/19/22 0540   • heparin infusion 25,000 units in 500 mL 0.45% NACL 18 Units/kg/hr at 05/19/22 0700   • senna-docusate (PERICOLACE or SENOKOT S) 8.6-50 MG per tablet 2 Tablet 2 Tablet at 05/18/22 1741    And   • polyethylene glycol/lytes (MIRALAX) PACKET 1 Packet      And   • magnesium hydroxide (MILK OF MAGNESIA) suspension 30 mL      And   • bisacodyl (DULCOLAX) suppository 10 mg     • acetaminophen (Tylenol) tablet 650 mg 650 mg at 05/17/22 0956   • piperacillin-tazobactam (ZOSYN) 3.375 g in  mL IVPB 3.375 g at 05/19/22 0551   • allopurinol (ZYLOPRIM) tablet 300 mg 300 mg at 05/19/22 0606   • prazosin (MINIPRESS) capsule 1 mg 1 mg at 05/17/22 2255   • tamsulosin (FLOMAX) capsule 0.4 mg 0.4 mg at 05/19/22 0606   • allopurinol (ZYLOPRIM) tablet 450 mg 450 mg at 05/18/22 2138       ASSESSMENT/PLAN:  This is a 77 year old male with history of  subdural hematoma s/p craniotomy in 2018, hypertension, BPH, and gout who presented after a syncopal event. He describes a history in which he has been constipated and  dehydrated over the last few days, pointing towards reflex or orthostatic syncope; however, he also has atrial fibrillation on EKG. Additionally, he takes prazosin, lisinopril, and hydrochlorothiazide, which could also have led to the presentation of this episode due to their side effects. Given his history of subdural hematoma, probable paroxysmal atrial fibrillation, diverticulitis at advanced age and [place] syncope score, he has been admitted for further evaluation and treatment.     #Syncopal episode  #H/o subdural hematoma s/p craniotomy in 2018  #Severe, probable D1 grade, aortic stenosis  -CT head did not show acute bleed  -Cranial nerve exam at admission was WNL and he continues to have no focal deficits  -echocardiogram shows severe, probable low flow/low gradient aortic stenosis with EF of 30%  -Cardiology consulted, recommended discussing case with neurology for possibility of starting DOAC  -Neurology reviewed case, confirmed that DOAC could be started in this patient  - Per cardiology recommendation, heparin drip was started on 5/18/2020 2 in the afternoon  - Per cardiology, further evaluation and work-up will depend on whether the patient tolerates the heparin drip  -Recommendations of cardiology and neurology greatly appreciated  Plan:  -Fall precautions  - Continue heparin drip  - Consult with palliative care is pending  -Awaiting structural cardiology evaluation   -MIVF @ 125 ml/hr  -Continue telemetry monitoring     #Diverticulitis  -CT a/p shows diverticula with hazy fat stranding adjacent to the sigmoid colon  -Patient received a dose of zosyn in the ED  Plan:  -Continue full liquid diet  -Bowel prophylaxis regimen  -Continue maintenance IV fluids  -Zosyn 4.5g Q6hrs started on 5/17/21  -Transition to regular diet today; if patient is able to tolerate, then we will stop IV antibiotics tomorrow and transition to p.o. Augmentin and Flagyl     #Stage 3b CKD  #Hypotension  #Hypokalemia  -GFR 43  at admission, with BUN/Cr 30/1.61  -Likely prerenal due to dehydration, but there is possibility of obstruction due to BPH  - Creatinine of 1.56 today, up slightly from 1.36 yesterday  Plan:  -Bladder scan protocol  -IV fluids at 125ml/hr  -Continue BPH medications  -Renal US if worsening  -Monitor outpatient; if has >5 gfr decrease per year or gfr <30, refer to nephrology  -Avoid nephrotoxins, renally dose medications     #Atrial fibrillation  #Elevated troponin  -EKG in ED shows afib with rate 80  -Patient asymptomatic  -Troponin 33, with downtrend and no need for further testing  -ChadsVasc score: 4  Plan:  - Patient currently on heparin drip  -Pending structural cardiology evaluation  -Telemetry monitoring  -Discuss anticoagulation risks and benefits prior to discharge     #Left knee pain  -Osteoarthritis on x-ray imaging without fracture or subluxation  - Patient has had worsening pain, requiring Dilaudid  Plan:  -Dilaudid now scheduled  - MRI of left knee is now pending     #BPH  -Continue home prazosin and tamsulosin  -Patient should have outpatient follow up regarding his prazosin, as it could contribute to his near syncopal episodes that he states he has fairly frequently     #Hypertension  -Holding lisinopril and HCTZ home doses  -Patient should have outpatient follow up regarding his ACEI and HCTZ, as they could contribute to his near syncopal episodes that he states he has fairly frequently    Social:  - On 5/18 and 5/19, discussions were held with patient and family present in the room regarding CODE STATUS and prognosis of severe aortic stenosis  - At this time, patient would like to remain full code    Core Measures:   Fluids: NS@125 mL/hour  Lines: PIV  Abx: Zosyn started 5/17/2022  DVT prophylaxis: Patient is on a heparin drip  Code Status: Full code     Disposition: Inpatient for IV antibiotics and consult from structural cardiology      Herber Blanton MD   PGY-2 Family Medicine Resident    [facility], [place]

## 2022-05-19 NOTE — PROGRESS NOTES
Monitor Summary    Rhythm: A-fib/A-flutter  Rate: 82-89  Ectopy: (R) PVCs  Measurement: -/.14/-

## 2022-05-19 NOTE — PROGRESS NOTES
Received report and assumed care of patient. Patient is alert and oriented x4. Patient is in no signs of distress. Patient was updated on the plan of care for the day. Call light within reach, bed in low position, 2 side rails up. All fall precautions in place.

## 2022-05-20 DIAGNOSIS — I35.0 SEVERE AORTIC STENOSIS: ICD-10-CM

## 2022-05-20 DIAGNOSIS — I48.11 LONGSTANDING PERSISTENT ATRIAL FIBRILLATION (HCC): ICD-10-CM

## 2022-05-20 DIAGNOSIS — R55 SYNCOPE AND COLLAPSE: ICD-10-CM

## 2022-05-20 LAB
ALBUMIN SERPL BCP-MCNC: 3.2 G/DL (ref 3.2–4.9)
ALBUMIN/GLOB SERPL: 1 G/DL
ALP SERPL-CCNC: 70 U/L (ref 30–99)
ALT SERPL-CCNC: 17 U/L (ref 2–50)
ANION GAP SERPL CALC-SCNC: 12 MMOL/L (ref 7–16)
AST SERPL-CCNC: 28 U/L (ref 12–45)
BASOPHILS # BLD AUTO: 0.2 % (ref 0–1.8)
BASOPHILS # BLD: 0.02 K/UL (ref 0–0.12)
BILIRUB SERPL-MCNC: 0.7 MG/DL (ref 0.1–1.5)
BUN SERPL-MCNC: 21 MG/DL (ref 8–22)
CALCIUM SERPL-MCNC: 8.3 MG/DL (ref 8.5–10.5)
CHLORIDE SERPL-SCNC: 103 MMOL/L (ref 96–112)
CO2 SERPL-SCNC: 20 MMOL/L (ref 20–33)
CREAT SERPL-MCNC: 1.57 MG/DL (ref 0.5–1.4)
EOSINOPHIL # BLD AUTO: 0.15 K/UL (ref 0–0.51)
EOSINOPHIL NFR BLD: 1.3 % (ref 0–6.9)
ERYTHROCYTE [DISTWIDTH] IN BLOOD BY AUTOMATED COUNT: 46 FL (ref 35.9–50)
GFR SERPLBLD CREATININE-BSD FMLA CKD-EPI: 45 ML/MIN/1.73 M 2
GLOBULIN SER CALC-MCNC: 3.2 G/DL (ref 1.9–3.5)
GLUCOSE SERPL-MCNC: 112 MG/DL (ref 65–99)
HCT VFR BLD AUTO: 36.8 % (ref 42–52)
HGB BLD-MCNC: 11.8 G/DL (ref 14–18)
IMM GRANULOCYTES # BLD AUTO: 0.14 K/UL (ref 0–0.11)
IMM GRANULOCYTES NFR BLD AUTO: 1.2 % (ref 0–0.9)
INR PPP: 1.16 (ref 0.87–1.13)
LYMPHOCYTES # BLD AUTO: 1.35 K/UL (ref 1–4.8)
LYMPHOCYTES NFR BLD: 11.8 % (ref 22–41)
MCH RBC QN AUTO: 28.6 PG (ref 27–33)
MCHC RBC AUTO-ENTMCNC: 32.1 G/DL (ref 33.7–35.3)
MCV RBC AUTO: 89.3 FL (ref 81.4–97.8)
MONOCYTES # BLD AUTO: 1.94 K/UL (ref 0–0.85)
MONOCYTES NFR BLD AUTO: 17 % (ref 0–13.4)
NEUTROPHILS # BLD AUTO: 7.84 K/UL (ref 1.82–7.42)
NEUTROPHILS NFR BLD: 68.5 % (ref 44–72)
NRBC # BLD AUTO: 0 K/UL
NRBC BLD-RTO: 0 /100 WBC
PLATELET # BLD AUTO: 187 K/UL (ref 164–446)
PMV BLD AUTO: 11.4 FL (ref 9–12.9)
POTASSIUM SERPL-SCNC: 3.9 MMOL/L (ref 3.6–5.5)
PROT SERPL-MCNC: 6.4 G/DL (ref 6–8.2)
PROTHROMBIN TIME: 14.5 SEC (ref 12–14.6)
RBC # BLD AUTO: 4.12 M/UL (ref 4.7–6.1)
SODIUM SERPL-SCNC: 135 MMOL/L (ref 135–145)
UFH PPP CHRO-ACNC: 0.12 IU/ML (ref 0–9999)
UFH PPP CHRO-ACNC: 0.17 IU/ML (ref 0–9999)
WBC # BLD AUTO: 11.4 K/UL (ref 4.8–10.8)

## 2022-05-20 PROCEDURE — 85025 COMPLETE CBC W/AUTO DIFF WBC: CPT

## 2022-05-20 PROCEDURE — A9270 NON-COVERED ITEM OR SERVICE: HCPCS | Performed by: STUDENT IN AN ORGANIZED HEALTH CARE EDUCATION/TRAINING PROGRAM

## 2022-05-20 PROCEDURE — 700102 HCHG RX REV CODE 250 W/ 637 OVERRIDE(OP): Performed by: FAMILY MEDICINE

## 2022-05-20 PROCEDURE — A9270 NON-COVERED ITEM OR SERVICE: HCPCS | Performed by: FAMILY MEDICINE

## 2022-05-20 PROCEDURE — 99233 SBSQ HOSP IP/OBS HIGH 50: CPT | Mod: FS | Performed by: INTERNAL MEDICINE

## 2022-05-20 PROCEDURE — 700102 HCHG RX REV CODE 250 W/ 637 OVERRIDE(OP): Performed by: STUDENT IN AN ORGANIZED HEALTH CARE EDUCATION/TRAINING PROGRAM

## 2022-05-20 PROCEDURE — 36415 COLL VENOUS BLD VENIPUNCTURE: CPT

## 2022-05-20 PROCEDURE — 700102 HCHG RX REV CODE 250 W/ 637 OVERRIDE(OP): Performed by: NURSE PRACTITIONER

## 2022-05-20 PROCEDURE — 80053 COMPREHEN METABOLIC PANEL: CPT

## 2022-05-20 PROCEDURE — A9270 NON-COVERED ITEM OR SERVICE: HCPCS | Performed by: PHYSICIAN ASSISTANT

## 2022-05-20 PROCEDURE — 700102 HCHG RX REV CODE 250 W/ 637 OVERRIDE(OP): Performed by: PHYSICIAN ASSISTANT

## 2022-05-20 PROCEDURE — 700111 HCHG RX REV CODE 636 W/ 250 OVERRIDE (IP): Performed by: STUDENT IN AN ORGANIZED HEALTH CARE EDUCATION/TRAINING PROGRAM

## 2022-05-20 PROCEDURE — 99233 SBSQ HOSP IP/OBS HIGH 50: CPT | Mod: GC | Performed by: FAMILY MEDICINE

## 2022-05-20 PROCEDURE — 85520 HEPARIN ASSAY: CPT | Mod: 91

## 2022-05-20 PROCEDURE — 85610 PROTHROMBIN TIME: CPT

## 2022-05-20 PROCEDURE — A9270 NON-COVERED ITEM OR SERVICE: HCPCS | Performed by: NURSE PRACTITIONER

## 2022-05-20 PROCEDURE — 770020 HCHG ROOM/CARE - TELE (206)

## 2022-05-20 RX ORDER — HYDROMORPHONE HYDROCHLORIDE 1 MG/ML
0.5 INJECTION, SOLUTION INTRAMUSCULAR; INTRAVENOUS; SUBCUTANEOUS EVERY 4 HOURS
Status: DISCONTINUED | OUTPATIENT
Start: 2022-05-20 | End: 2022-05-22

## 2022-05-20 RX ORDER — CYCLOBENZAPRINE HCL 10 MG
10 TABLET ORAL 3 TIMES DAILY
Status: DISCONTINUED | OUTPATIENT
Start: 2022-05-20 | End: 2022-05-21

## 2022-05-20 RX ORDER — WARFARIN SODIUM 2.5 MG/1
2.5 TABLET ORAL DAILY
Status: DISCONTINUED | OUTPATIENT
Start: 2022-05-20 | End: 2022-05-22

## 2022-05-20 RX ORDER — AMOXICILLIN AND CLAVULANATE POTASSIUM 875; 125 MG/1; MG/1
1 TABLET, FILM COATED ORAL EVERY 12 HOURS
Status: COMPLETED | OUTPATIENT
Start: 2022-05-20 | End: 2022-05-24

## 2022-05-20 RX ORDER — ENOXAPARIN SODIUM 150 MG/ML
1 INJECTION SUBCUTANEOUS EVERY 12 HOURS
Status: DISCONTINUED | OUTPATIENT
Start: 2022-05-20 | End: 2022-05-27 | Stop reason: HOSPADM

## 2022-05-20 RX ORDER — LISINOPRIL 5 MG/1
2.5 TABLET ORAL 2 TIMES DAILY
Status: DISCONTINUED | OUTPATIENT
Start: 2022-05-20 | End: 2022-05-27 | Stop reason: HOSPADM

## 2022-05-20 RX ADMIN — METOPROLOL SUCCINATE 12.5 MG: 25 TABLET, EXTENDED RELEASE ORAL at 04:57

## 2022-05-20 RX ADMIN — TAMSULOSIN HYDROCHLORIDE 0.4 MG: 0.4 CAPSULE ORAL at 04:57

## 2022-05-20 RX ADMIN — AMOXICILLIN AND CLAVULANATE POTASSIUM 1 TABLET: 875; 125 TABLET, FILM COATED ORAL at 14:48

## 2022-05-20 RX ADMIN — ENOXAPARIN SODIUM 150 MG: 150 INJECTION SUBCUTANEOUS at 17:48

## 2022-05-20 RX ADMIN — HYDROMORPHONE HYDROCHLORIDE 0.5 MG: 1 INJECTION, SOLUTION INTRAMUSCULAR; INTRAVENOUS; SUBCUTANEOUS at 08:11

## 2022-05-20 RX ADMIN — OXYCODONE HYDROCHLORIDE 10 MG: 10 TABLET ORAL at 05:01

## 2022-05-20 RX ADMIN — LISINOPRIL 2.5 MG: 5 TABLET ORAL at 17:48

## 2022-05-20 RX ADMIN — CYCLOBENZAPRINE 10 MG: 10 TABLET, FILM COATED ORAL at 17:49

## 2022-05-20 RX ADMIN — HYDROMORPHONE HYDROCHLORIDE 0.5 MG: 1 INJECTION, SOLUTION INTRAMUSCULAR; INTRAVENOUS; SUBCUTANEOUS at 02:32

## 2022-05-20 RX ADMIN — HYDROMORPHONE HYDROCHLORIDE 0.5 MG: 1 INJECTION, SOLUTION INTRAMUSCULAR; INTRAVENOUS; SUBCUTANEOUS at 13:12

## 2022-05-20 RX ADMIN — HYDROMORPHONE HYDROCHLORIDE 0.5 MG: 1 INJECTION, SOLUTION INTRAMUSCULAR; INTRAVENOUS; SUBCUTANEOUS at 17:48

## 2022-05-20 RX ADMIN — ALLOPURINOL 300 MG: 300 TABLET ORAL at 04:57

## 2022-05-20 RX ADMIN — HYDROMORPHONE HYDROCHLORIDE 0.5 MG: 1 INJECTION, SOLUTION INTRAMUSCULAR; INTRAVENOUS; SUBCUTANEOUS at 21:32

## 2022-05-20 RX ADMIN — OXYCODONE 5 MG: 5 TABLET ORAL at 10:46

## 2022-05-20 RX ADMIN — ALLOPURINOL 450 MG: 300 TABLET ORAL at 21:32

## 2022-05-20 RX ADMIN — LISINOPRIL 2.5 MG: 5 TABLET ORAL at 13:12

## 2022-05-20 RX ADMIN — WARFARIN SODIUM 2.5 MG: 2.5 TABLET ORAL at 17:48

## 2022-05-20 RX ADMIN — HEPARIN SODIUM 22 UNITS/KG/HR: 5000 INJECTION, SOLUTION INTRAVENOUS at 09:46

## 2022-05-20 RX ADMIN — CYCLOBENZAPRINE 10 MG: 10 TABLET, FILM COATED ORAL at 14:48

## 2022-05-20 ASSESSMENT — ENCOUNTER SYMPTOMS
CHILLS: 0
ORTHOPNEA: 0
DIARRHEA: 0
COUGH: 0
PALPITATIONS: 0
FEVER: 0
CONSTIPATION: 0
PND: 0
DIZZINESS: 0

## 2022-05-20 ASSESSMENT — CHA2DS2 SCORE
AGE 75 OR GREATER: YES
PRIOR STROKE OR TIA OR THROMBOEMBOLISM: NO
CHF OR LEFT VENTRICULAR DYSFUNCTION: NO
VASCULAR DISEASE: NO
HYPERTENSION: YES
AGE 65 TO 74: NO
CHA2DS2 VASC SCORE: 3
DIABETES: NO
SEX: MALE

## 2022-05-20 ASSESSMENT — PAIN DESCRIPTION - PAIN TYPE
TYPE: ACUTE PAIN

## 2022-05-20 ASSESSMENT — FIBROSIS 4 INDEX: FIB4 SCORE: 2.87

## 2022-05-20 NOTE — PROGRESS NOTES
12 hour chart check.    Have a great shift!    Monitor Summary     [id number]  PVC(r)  /14/

## 2022-05-20 NOTE — PROGRESS NOTES
[facility] [place]GRESS [place]        Attending:   Dr. Puente    Resident:   Herber Blanton M.D.    PATIENT:   Sim Osorio; [id number]; 1945    ID:   77 y.o. male with a history of subdural hematoma s/p craniotomy in 2018 admitted for syncope workup, and found to have severe aortic stenosis and rate controlled atrial fibrillation.    SUBJECTIVE:   No acute events overnight.  The patient states that he is still experiencing significant pain in the left knee.  He says that Dilaudid is helping, but it still occasionally spasms.  He denies shortness of breath, chest pain, cough, and further denies that the pain is radiating or located anywhere else besides the medial knee.    A long discussion with the patient and family today occurred regarding warfarin versus DOAC anticoagulation.  Ultimately, after risks and benefits of each were discussed, the patient and his family elected to start warfarin therapy.    OBJECTIVE:  Vitals:    05/19/22 1956 05/19/22 2127 05/19/22 2301 05/20/22 0440   BP: 100/59 102/66 105/77 106/75   Pulse: 69 84 82 85   Resp: 18  18 18   Temp: 36.6 °C (97.8 °F)  36.4 °C (97.5 °F) 36.8 °C (98.2 °F)   TempSrc: Temporal  Temporal Temporal   SpO2: 93%  92% 93%   Weight: (!) 163 kg (359 lb 5.6 oz)      Height:           Intake/Output Summary (Last 24 hours) at 5/20/2022 0706  Last data filed at 5/19/2022 0830  Gross per 24 hour   Intake 569.8 ml   Output 150 ml   Net 419.8 ml       PHYSICAL EXAM:  General: No acute distress, afebrile, resting comfortably, elevated BMI  HEENT: NC/AT. EOMI.   Cardiovascular: Irregularly irregular without murmurs. Normal capillary refill   Respiratory: CTAB  Abdomen: soft, nontender, large abdominal habitus, no masses  EXT:  STAPLES  Right lower extremity: Unremarkable  Left lower extremity: No calf discrepancy diameter between lower extremities; tender to palpation at the medial collateral ligament; no erythema; mildly edematous at level of knee only  Skin: No  erythema/lesions   Neuro: Non-focal    LABS:  Recent Labs     05/18/22  0243 05/19/22  0101 05/20/22  0248   WBC 12.9* 12.2* 11.4*   RBC 4.57* 4.44* 4.12*   HEMOGLOBIN 12.9* 12.9* 11.8*   HEMATOCRIT 41.3* 39.6* 36.8*   MCV 90.4 89.2 89.3   MCH 28.2 29.1 28.6   RDW 47.6 46.0 46.0   PLATELETCT 164 170 187   MPV 10.9 10.7 11.4   NEUTSPOLYS 76.30* 74.30* 68.50   LYMPHOCYTES 7.40* 7.00* 11.80*   MONOCYTES 15.00* 16.80* 17.00*   EOSINOPHILS 0.20 0.50 1.30   BASOPHILS 0.20 0.20 0.20     Recent Labs     05/17/22  0841 05/18/22 0243 05/19/22 0101 05/20/22 0248   SODIUM  --  134* 130* 135   POTASSIUM  --  3.8 3.8 3.9   CHLORIDE  --  106 100 103   CO2  --  17* 20 20   BUN  --  24* 22 21   CREATININE  --  1.36 1.56* 1.57*   CALCIUM  --  8.3* 8.2* 8.3*   MAGNESIUM 2.0  --   --   --    ALBUMIN  --  3.2 3.2 3.2     Estimated GFR/CRCL = Estimated Creatinine Clearance: 61 mL/min (A) (by C-G formula based on SCr of 1.57 mg/dL (H)).  Recent Labs     05/18/22 0243 05/19/22 0101 05/20/22 0248   GLUCOSE 124* 132* 112*     Recent Labs     05/18/22  0243 05/18/22  1641 05/19/22  0101 05/20/22  0248   ASTSGOT 28  --  21 28   ALTSGPT 14  --  15 17   TBILIRUBIN 0.9  --  0.9 0.7   ALKPHOSPHAT 61  --  65 70   GLOBULIN 3.5  --  3.1 3.2   INR  --  1.35*  --   --              Recent Labs     05/18/22  1641   INR 1.35*   APTT 43.7*         IMAGING:  EC-ECHOCARDIOGRAM COMPLETE W/ CONT   Final Result      DX-KNEE 2- LEFT   Final Result      1. Mild anterolateral soft tissue swelling. No fracture, subluxation or joint effusion.   2. Tricompartmental left knee osteoarthritis, Kellgren Cam grade 4 involving the medial knee joint compartment.      CT-TSPINE W/O PLUS RECONS   Final Result         1.  No acute traumatic bony injury of the thoracic spine.   2.  Atherosclerosis and atherosclerotic coronary artery disease      CT-LSPINE W/O PLUS RECONS   Final Result         1.  No acute traumatic bony injury of the lumbar spine. Multilevel  degenerative changes of lumbar spine diminish diagnostic sensitivity of this exam.   2.  Diverticulosis with hazy fat stranding adjacent to sigmoid colon suggesting changes of sigmoid diverticulitis.   3.  Atherosclerosis      CT-HEAD W/O   Final Result         1.  No acute intracranial abnormality.   2.  Left maxillary sinusitis changes   3.  Atherosclerosis.         MR-KNEE-W/O LEFT    (Results Pending)       MEDS:  Current Facility-Administered Medications   Medication Last Admin   • HYDROmorphone (Dilaudid) injection 0.5 mg     • lisinopril (PRINIVIL) tablet 2.5 mg 2.5 mg at 05/19/22 1736   • LORazepam (ATIVAN) injection 1 mg     • nitroglycerin (NITROSTAT) tablet 0.4 mg     • oxyCODONE immediate-release (ROXICODONE) tablet 5 mg 5 mg at 05/18/22 2333   • metoprolol SR (TOPROL XL) tablet 12.5 mg 12.5 mg at 05/20/22 0457   • heparin infusion 25,000 units in 500 mL 0.45% NACL 22 Units/kg/hr at 05/20/22 0453   • senna-docusate (PERICOLACE or SENOKOT S) 8.6-50 MG per tablet 2 Tablet 2 Tablet at 05/18/22 1741    And   • polyethylene glycol/lytes (MIRALAX) PACKET 1 Packet      And   • magnesium hydroxide (MILK OF MAGNESIA) suspension 30 mL      And   • bisacodyl (DULCOLAX) suppository 10 mg     • acetaminophen (Tylenol) tablet 650 mg 650 mg at 05/17/22 0956   • allopurinol (ZYLOPRIM) tablet 300 mg 300 mg at 05/20/22 0457   • prazosin (MINIPRESS) capsule 1 mg 1 mg at 05/17/22 2255   • tamsulosin (FLOMAX) capsule 0.4 mg 0.4 mg at 05/20/22 0457   • allopurinol (ZYLOPRIM) tablet 450 mg 450 mg at 05/19/22 2128       ASSESSMENT/PLAN:    This is a 77 year old male with history of  subdural hematoma s/p craniotomy in 2018, hypertension, BPH, and gout who presented after a syncopal event. He describes a history in which he has been constipated and dehydrated over the last few days, pointing towards reflex or orthostatic syncope; however, he also has atrial fibrillation on EKG. Additionally, he takes prazosin, lisinopril, and  hydrochlorothiazide, which could also have led to the presentation of this episode due to their side effects. Given his history of subdural hematoma, probable paroxysmal atrial fibrillation, diverticulitis at advanced age and Gaines syncope score, he has been admitted for further evaluation and treatment.     #Syncopal episode  #H/o subdural hematoma s/p craniotomy in 2018  #Severe, probable D1 grade, aortic stenosis  -CT head did not show acute bleed  -Cranial nerve exam at admission was WNL and he continues to have no focal deficits  -echocardiogram shows severe, probable low flow/low gradient aortic stenosis with EF of 30%  -Cardiology consulted, recommended discussing case with neurology for possibility of starting DOAC  -Neurology reviewed case, confirmed that DOAC could be started in this patient  - Per cardiology recommendation, heparin drip was started on 5/18/2020 2 in the afternoon  -  Heparin drip was stopped on 5/20/2022  - In discussion of risks and benefits of anticoagulants, patient and family elected to start warfarin based on the recommendation from cardiology that the patient be on an anticoagulant during his outpatient work-up for aortic stenosis  - In discussion with Pharm.D., it was decided to bridge the patient with Lovenox during transition to warfarin  Plan:  -Fall precautions  - Start Lovenox bridge therapy with initiation of warfarin today, 5/20/2022  - Consult with palliative care is pending  -Outpatient work-up by structural cardiology and outpatient EP, going to occur at [facility] per cardiology; time and recommendations of the cardiology team greatly appreciated  -Continue telemetry monitoring     #Diverticulitis  -CT a/p shows diverticula with hazy fat stranding adjacent to the sigmoid colon  -Patient received a dose of zosyn in the ED  - Patient had been started on a full liquid diet, tolerated it well, and on 5/20/2022 the patient tolerated a regular diet quite well  - Patient  began Augmentin on 5/20/2022, day 1 of a 4-day course, for a total of 7 full days of antibiotics  Plan:  -Continue regular diet  -Bowel prophylaxis regimen  -Augmentin, day 1 of 4     #Stage 3b CKD  #Hypotension  #Hypokalemia  -GFR 43 at admission, with BUN/Cr 30/1.61  -Likely prerenal due to dehydration, but there is possibility of obstruction due to BPH  - Creatinine of 1.57 today, stable from yesterday  Plan:  -Bladder scan protocol  -Continue BPH medications  -Renal US if worsening  -Monitor outpatient; if has >5 gfr decrease per year or gfr <30, refer to nephrology  -Avoid nephrotoxins, renally dose medications     #Atrial fibrillation  #Elevated troponin  -EKG in ED shows afib with rate 80  -Patient asymptomatic  -Troponin 33, with downtrend and no need for further testing  -ChadsVasc score: 4  Plan:  - Starting warfarin therapeutic Lovenox bridge  -Patient to follow-up outpatient with structural cards and EP  -Telemetry monitoring     #Left knee pain  -Osteoarthritis on x-ray imaging without fracture or subluxation  - Patient has had worsening pain, requiring Dilaudid  Plan:  -Scheduled Dilaudid  - As needed Flexeril  - MRI of left knee is now pending     #BPH  -Continue home prazosin and tamsulosin  -Patient should have outpatient follow up regarding his prazosin, as it could contribute to his near syncopal episodes that he states he has fairly frequently     #Hypertension  -Holding lisinopril and HCTZ home doses  -Patient should have outpatient follow up regarding his ACEI and HCTZ, as they could contribute to his near syncopal episodes that he states he has fairly frequently     Social:  - On 5/18 and 5/19, discussions were held with patient and family present in the room regarding CODE STATUS and prognosis of severe aortic stenosis  - Patient would like to remain full code     Core Measures:   Fluids: NS@125 mL/hour  Lines: PIV  Abx: Augmentin  DVT prophylaxis: Lovenox bridge to warfarin  Code  Status: Full code     Disposition: Inpatient    Herber Blanton MD   PGY-2 Family Medicine Resident   [facility]

## 2022-05-20 NOTE — PROGRESS NOTES
Inpatient Anticoagulation Service Note    Date: 5/20/2022    Reason for Anticoagulation: Atrial Fibrillation   Target INR: 2.0 to 3.0  [id number] VASc Score: 3  HAS-BLED Score: 4   Hemoglobin Value: (!) 11.8  Hematocrit Value: (!) 36.8  Lab Platelet Value: 187    INR from last 7 days     Date/Time INR Value    05/18/22 1641 1.35    05/17/22 0447 1.24        Dose from last 7 days     Date/Time Dose (mg)    05/20/22 1428 2.5        Average Dose (mg):  (New start)  Significant Interactions: Antibiotics  Bridge Therapy: Yes  Reversal Agent Administered: Not Applicable    Comments: New start warfarin for history of AF. Patient with history of ICH due to HTN (s/p crani in 2018), AF, aortic stenosis; cardology and neurology both in agreement for anticoagulation. CBC stable and no signs of active bleeding. Baseline INR sub-therapeutic. DDI listed above. Cardiac diet ordered, but no intake charted. Baseline INR sub-therapeutic. Given history of ICH will initiate warfarin at 2.5mg po daily with repeat INR tomorrow. Note - given body habitus and PMH, and after discussion with patient/family, warfarin selected over OAC. Currently bridging with therapeutic Lovenox, can likely discontinue once INR therapeutic (no need to wait 5 days, given no acute clot).    Plan:  Warfarin 2.5mg po daily  Education Material Provided?: No (will need prior to discharge)  Pharmacist suggested discharge dosing: SURAJ Tejada, PharmD, BCCCP

## 2022-05-20 NOTE — PROGRESS NOTES
Handoff report received from day shift nurse. Patient care assumed. Patient is currently resting in bed. Plan of care discussed with the patient and the patient verbalizes no questions at this time. Patient is A&O x2, on 1L, telemetry monitoring in place and rhythm verified, and vital signs are stable. Patient states their pain is 0/10 at this time. Call light and belongings within reach, bed in lowest and locked position, and patient educated on the use of call light. Will continue plan of care.

## 2022-05-20 NOTE — PROGRESS NOTES
[facility] Cardiology Progress Note    Name:   Sim Osorio   YOB: 1945  Age:   77 y.o.  male   MRN:   [id number]    Consulting Cardiologist: Dr. Bentley    Chief Complaint: Syncope    History of Present Illness:  Sim is a 76yo male without any known cardiac problems, history of (?spontaneous) intracranial hemorrhage 2018 who had 5 days of flu-like symptoms and diarrhea, then on 5/17/22 when he was in the bathroom he syncopized, his wife found him on the ground and he was brought to [facility] ER for evaluation.     This admission his echocardiogram showed a reduced EF of 30% with global hypokinesis, probable low flow low gradient severe aortic valve stenosis,  aortic valve area calculated from the continuity equation is 0.7 cm2, Vmax is 3.9 m/s. Mean: 38 mmHg. He was in atrial fibrillation, rate controlled, with known LBBB. This admission he has been started on anticoagulation after discussing case with neurology.     With regards to his syncopal episode.  He had had several days of diarrhea and general malaise.  He has been getting in and out of bed all night to use the bathroom he did not specifically say he felt lightheaded or dizziness upon standing.  He did not have any palpitations or chest pain prior to the syncopal episode.  This is the first time he has ever had a syncopal episode.    Prior to this viral illness he had been in his usual state of health, he uses a walker for ambulation and lives with his wife in a handicap apartment.  He does get short of breath upon mild exertion such as walking from one end of the apartment to the other.  He does not recall having chest pain prior to the admission.  He has never been told about aortic stenosis or atrial fibrillation.      Interim Events:   Sim's knee pain has been better controlled today, he is feeling a little more comfortable.  His GI illness and diarrhea has resolved.  He has not been out of bed and doesn't believe he has the strength  "to do so. He is laying flat this morning without hypoxia (on room air). His O2 sats dip while he is sleeping and he uses O2 for this, he has been diagnosed with sleep apnea \"15 years ago\" but does not use a CPAP because he didn't know if it was helping.     At home he usually takes hydrochlorothiazide which helps with weight gain and fluid retention.  At one point his primary provider stopped the hydrochlorothiazide and he gained \"30 pounds\".  He went back on the hydrochlorothiazide and his volume normalized.  At one point he was given furosemide but thinks it may have been too strong for him and this was discontinued.    On telemetry he is in atrial fibrillation with rates <100 bpm.      Review of Systems   Constitutional: Negative for chills, fever and malaise/fatigue.   Respiratory: Negative for cough.    Cardiovascular: Negative for chest pain, palpitations, orthopnea, leg swelling and PND.   Gastrointestinal: Negative for constipation and diarrhea.   Musculoskeletal: Positive for joint pain.   Neurological: Negative for dizziness.         Past Surgical History:   Procedure Laterality Date   • HERNIA REPAIR     • OTHER ORTHOPEDIC SURGERY      rt rotator cuff,  leftknee        History reviewed. No pertinent family history.    Social History     Tobacco Use   Smoking Status Never Smoker   Smokeless Tobacco Not on file       No Known Allergies      Medications   No current facility-administered medications on file prior to encounter.     Current Outpatient Medications on File Prior to Encounter   Medication Sig Dispense Refill   • allopurinol (ZYLOPRIM) 300 MG Tab Take 300-450 mg by mouth 2 times a day. 300 mg in the AM  450 mg in the PM     • acetaminophen (TYLENOL) 500 MG Tab Take 500 mg by mouth every morning.     • docusate sodium (COLACE) 100 MG Cap Take 100 mg by mouth every day.     • lisinopril (PRINIVIL) 10 MG Tab Take 1 Tablet by mouth every day. 90 Tablet 3   • hydroCHLOROthiazide (HYDRODIURIL) 25 MG Tab " Take 1 Tablet by mouth every day. 90 Tablet 3   • tamsulosin (FLOMAX) 0.4 MG capsule TAKE 1 CAPSULE BY MOUTH 30 MINUTES AFTER BREAKFAST FOR 60 DAYS (Patient taking differently: Take 0.4 mg by mouth every day.) 60 Capsule 0   • prazosin (MINIPRESS) 1 MG Cap Take 1 Capsule by mouth every evening. 1 po at night time 30 Capsule 6           Physical Exam  Vitals:    05/20/22 0711   BP: 134/84   Pulse: 76   Resp: 17   Temp: 36.7 °C (98 °F)   SpO2: 96%     Physical Exam  Vitals reviewed.   Constitutional:       Appearance: Normal appearance. He is obese.   HENT:      Head: Normocephalic and atraumatic.   Neck:      Comments: Difficult to assess JVD due to body habitus  Cardiovascular:      Rate and Rhythm: Normal rate. Rhythm irregular.      Heart sounds: Murmur heard.   Pulmonary:      Effort: Pulmonary effort is normal.      Breath sounds: No wheezing or rales.   Abdominal:      General: There is no distension.   Musculoskeletal:      Right lower leg: No edema.      Left lower leg: No edema.   Skin:     General: Skin is warm and dry.      Coloration: Skin is not pale.   Neurological:      Mental Status: He is alert. Mental status is at baseline.   Psychiatric:         Judgment: Judgment normal.         Labs (personally reviewed):     Lab Results   Component Value Date/Time    SODIUM 135 05/20/2022 02:48 AM    POTASSIUM 3.9 05/20/2022 02:48 AM    CHLORIDE 103 05/20/2022 02:48 AM    CO2 20 05/20/2022 02:48 AM    GLUCOSE 112 (H) 05/20/2022 02:48 AM    BUN 21 05/20/2022 02:48 AM    CREATININE 1.57 (H) 05/20/2022 02:48 AM    CREATININE 1.1 03/26/2005 03:25 AM     Lab Results   Component Value Date/Time    CHOLSTRLTOT 148 09/18/2011 07:19 PM    LDL 91 09/18/2011 07:19 PM    HDL 41 09/18/2011 07:19 PM    TRIGLYCERIDE 78 09/18/2011 07:19 PM     Lab Results   Component Value Date/Time    BNPBTYPENAT 30 09/18/2011 06:20 PM         Cardiac Imaging and Procedures Review:      Personal Telemetry Review: AF rates <100, BBB      Echo  5/17/22:  CONCLUSIONS  Prior study 7-5-2010, compared to the report of the prior study, there   has been reduction of LVSF and development of aortic stenosis.   Moderate to severely reduced left ventricular systolic function.  The left ventricular ejection fraction is visually estimated to be 30%.  Global hypokinesis.  Probable low flow low gradient severe aortic valve stenosis.  Aortic valve area calculated from the continuity equation is 0.7 cm2.  Vmax is 3.9 m/s. Transvalvular gradients are - Peak: 61 mmHg, Mean: 38 mmHg.      Assessment and Medical Decision Making:  Syncope (presenting symptom)  Severe Aortic Stenosis, likely low flow low gradient severe,   Compensated Valvular Heart failure with reduced EF Stage C NYHA class III  - he is comfortable in bed without supplemental oxygen, his volume status is difficult to assess but I would avoid maintenance IV fluids as he is now eating and drinking. He has been on HCTZ 25mg at home long term and it seems reasonable to restart this possibly tomorrow to see how he tolerates it, he likely has a very sensitive fluid balance.    - metop SR 12.5mg  - lisinopril 2.5, can increase to bid today  - Case was discussed with TAVR team yesterday who recommend outpatient follow up and the most likely cause of syncope is dehydration in setting of GI illness    - I will try to arrange for a live outpatient monitor to be placed before discharge, if this can't be arrange he will come to our office for placement    Atrial Fibrillation, new diagnosis  - suspect this is a chronic finding, he has untreated sleep apnea. He is well rate controlled on Metop SR 12.5mg, avoid too much beta blockade/negative inotropes in this patient  - Primary team to transition to oral anticoagulation, VKA may be useful for its reversibility     Recommend patient continues with rehab to regain his physical strength and improve his knee pain. If he can tolerate mild activity he can see the valve team  outpatient, if he has chest pain, shortness of breath or presyncope that limits his mobility then will need to discuss an inpatient TAVR work up  Please see Dr. Bentley's attestation for additions and further recommendations.    Joyce Peacock PA-C  [facility]    I personally spent a total of 20 minutes which includes face-to-face time and non-face-to-face time spent on preparing to see the patient, reviewing hospital notes and tests, obtaining history from the patient, performing a medically appropriate exam, counseling and educating the patient, ordering medications/tests/procedures/referrals as clinically indicated, and documenting information in the electronic medical record.

## 2022-05-20 NOTE — PROGRESS NOTES
END OF SHIFT    Oxycodone 10 x3 for left leg pain/spasms.  Dilaudid x2 for breakthrough pain.  BM x1 today.  Family visited at bedside - updated & addressed all questions/concerns.  Heparin gtt @18.  pt AAOx4. pt resting, NADN. bed in lowest position & call light within reach.    Vitals:    05/19/22 1510   BP: 101/63   Pulse: 77   Resp: 18   Temp: 36.5 °C (97.7 °F)   SpO2: 94%     Stephenie Boucher RN

## 2022-05-20 NOTE — CARE PLAN
The patient is Watcher - Medium risk of patient condition declining or worsening    Shift Goals  Clinical Goals: Pain control, remain hemodynamically stable  Patient Goals: Pain control  Family Goals:  (CRIS)    Progress made toward(s) clinical / shift goals:    Problem: Pain - Standard  Goal: Alleviation of pain or a reduction in pain to the patient’s comfort goal  Outcome: Progressing     Problem: Knowledge Deficit - Standard  Goal: Patient and family/care givers will demonstrate understanding of plan of care, disease process/condition, diagnostic tests and medications  Outcome: Progressing     Problem: Fall Risk  Goal: Patient will remain free from falls  Outcome: Progressing     Problem: Skin Integrity  Goal: Skin integrity is maintained or improved  Outcome: Progressing       Patient is not progressing towards the following goals:

## 2022-05-20 NOTE — PROGRESS NOTES
Spoke to Dr. Harvinder Vee MD turned off heparin gtt @1030. Pt to decide between either coumadin or eliquis. Pt decided coumadin @1405 according to MD. Will start first dose tonight per pharmacy.    Stephenie Boucher R.N.

## 2022-05-21 ENCOUNTER — HOSPITAL ENCOUNTER (INPATIENT)
Facility: REHABILITATION | Age: 77
End: 2022-05-21
Attending: PHYSICAL MEDICINE & REHABILITATION | Admitting: PHYSICAL MEDICINE & REHABILITATION
Payer: COMMERCIAL

## 2022-05-21 ENCOUNTER — APPOINTMENT (OUTPATIENT)
Dept: RADIOLOGY | Facility: MEDICAL CENTER | Age: 77
DRG: 306 | End: 2022-05-21
Attending: STUDENT IN AN ORGANIZED HEALTH CARE EDUCATION/TRAINING PROGRAM
Payer: COMMERCIAL

## 2022-05-21 LAB
ANION GAP SERPL CALC-SCNC: 12 MMOL/L (ref 7–16)
BASOPHILS # BLD AUTO: 0.2 % (ref 0–1.8)
BASOPHILS # BLD: 0.02 K/UL (ref 0–0.12)
BUN SERPL-MCNC: 28 MG/DL (ref 8–22)
CALCIUM SERPL-MCNC: 8.6 MG/DL (ref 8.5–10.5)
CHLORIDE SERPL-SCNC: 102 MMOL/L (ref 96–112)
CO2 SERPL-SCNC: 18 MMOL/L (ref 20–33)
CREAT SERPL-MCNC: 1.56 MG/DL (ref 0.5–1.4)
CRP SERPL HS-MCNC: 25.05 MG/DL (ref 0–0.75)
EKG IMPRESSION: NORMAL
EOSINOPHIL # BLD AUTO: 0.11 K/UL (ref 0–0.51)
EOSINOPHIL NFR BLD: 1.1 % (ref 0–6.9)
ERYTHROCYTE [DISTWIDTH] IN BLOOD BY AUTOMATED COUNT: 45.4 FL (ref 35.9–50)
GFR SERPLBLD CREATININE-BSD FMLA CKD-EPI: 45 ML/MIN/1.73 M 2
GLUCOSE SERPL-MCNC: 117 MG/DL (ref 65–99)
HCT VFR BLD AUTO: 39.2 % (ref 42–52)
HGB BLD-MCNC: 12.7 G/DL (ref 14–18)
IMM GRANULOCYTES # BLD AUTO: 0.13 K/UL (ref 0–0.11)
IMM GRANULOCYTES NFR BLD AUTO: 1.3 % (ref 0–0.9)
INR PPP: 1.28 (ref 0.87–1.13)
LYMPHOCYTES # BLD AUTO: 1 K/UL (ref 1–4.8)
LYMPHOCYTES NFR BLD: 10.3 % (ref 22–41)
MCH RBC QN AUTO: 28.7 PG (ref 27–33)
MCHC RBC AUTO-ENTMCNC: 32.4 G/DL (ref 33.7–35.3)
MCV RBC AUTO: 88.7 FL (ref 81.4–97.8)
MONOCYTES # BLD AUTO: 1.58 K/UL (ref 0–0.85)
MONOCYTES NFR BLD AUTO: 16.3 % (ref 0–13.4)
NEUTROPHILS # BLD AUTO: 6.85 K/UL (ref 1.82–7.42)
NEUTROPHILS NFR BLD: 70.8 % (ref 44–72)
NRBC # BLD AUTO: 0 K/UL
NRBC BLD-RTO: 0 /100 WBC
PLATELET # BLD AUTO: 216 K/UL (ref 164–446)
PMV BLD AUTO: 11.5 FL (ref 9–12.9)
POTASSIUM SERPL-SCNC: 3.8 MMOL/L (ref 3.6–5.5)
PROTHROMBIN TIME: 15.6 SEC (ref 12–14.6)
RBC # BLD AUTO: 4.42 M/UL (ref 4.7–6.1)
SODIUM SERPL-SCNC: 132 MMOL/L (ref 135–145)
URATE SERPL-MCNC: 3.7 MG/DL (ref 2.5–8.3)
WBC # BLD AUTO: 9.7 K/UL (ref 4.8–10.8)

## 2022-05-21 PROCEDURE — 700102 HCHG RX REV CODE 250 W/ 637 OVERRIDE(OP): Performed by: STUDENT IN AN ORGANIZED HEALTH CARE EDUCATION/TRAINING PROGRAM

## 2022-05-21 PROCEDURE — A9270 NON-COVERED ITEM OR SERVICE: HCPCS | Performed by: NURSE PRACTITIONER

## 2022-05-21 PROCEDURE — 770020 HCHG ROOM/CARE - TELE (206)

## 2022-05-21 PROCEDURE — 99232 SBSQ HOSP IP/OBS MODERATE 35: CPT | Mod: GC | Performed by: FAMILY MEDICINE

## 2022-05-21 PROCEDURE — 36415 COLL VENOUS BLD VENIPUNCTURE: CPT

## 2022-05-21 PROCEDURE — 700102 HCHG RX REV CODE 250 W/ 637 OVERRIDE(OP): Performed by: PHYSICIAN ASSISTANT

## 2022-05-21 PROCEDURE — 73721 MRI JNT OF LWR EXTRE W/O DYE: CPT | Mod: LT,ME

## 2022-05-21 PROCEDURE — 700111 HCHG RX REV CODE 636 W/ 250 OVERRIDE (IP): Performed by: STUDENT IN AN ORGANIZED HEALTH CARE EDUCATION/TRAINING PROGRAM

## 2022-05-21 PROCEDURE — A9270 NON-COVERED ITEM OR SERVICE: HCPCS | Performed by: FAMILY MEDICINE

## 2022-05-21 PROCEDURE — A9270 NON-COVERED ITEM OR SERVICE: HCPCS | Performed by: PHYSICIAN ASSISTANT

## 2022-05-21 PROCEDURE — 86140 C-REACTIVE PROTEIN: CPT

## 2022-05-21 PROCEDURE — 93010 ELECTROCARDIOGRAM REPORT: CPT | Performed by: INTERNAL MEDICINE

## 2022-05-21 PROCEDURE — 700102 HCHG RX REV CODE 250 W/ 637 OVERRIDE(OP): Performed by: FAMILY MEDICINE

## 2022-05-21 PROCEDURE — 85610 PROTHROMBIN TIME: CPT

## 2022-05-21 PROCEDURE — 72148 MRI LUMBAR SPINE W/O DYE: CPT | Mod: MF

## 2022-05-21 PROCEDURE — 84550 ASSAY OF BLOOD/URIC ACID: CPT

## 2022-05-21 PROCEDURE — 80048 BASIC METABOLIC PNL TOTAL CA: CPT

## 2022-05-21 PROCEDURE — 700102 HCHG RX REV CODE 250 W/ 637 OVERRIDE(OP): Performed by: NURSE PRACTITIONER

## 2022-05-21 PROCEDURE — A9270 NON-COVERED ITEM OR SERVICE: HCPCS | Performed by: STUDENT IN AN ORGANIZED HEALTH CARE EDUCATION/TRAINING PROGRAM

## 2022-05-21 PROCEDURE — 85025 COMPLETE CBC W/AUTO DIFF WBC: CPT

## 2022-05-21 PROCEDURE — 93005 ELECTROCARDIOGRAM TRACING: CPT | Performed by: FAMILY MEDICINE

## 2022-05-21 PROCEDURE — 700105 HCHG RX REV CODE 258: Performed by: STUDENT IN AN ORGANIZED HEALTH CARE EDUCATION/TRAINING PROGRAM

## 2022-05-21 RX ORDER — LORAZEPAM 2 MG/ML
1 INJECTION INTRAMUSCULAR ONCE
Status: COMPLETED | OUTPATIENT
Start: 2022-05-21 | End: 2022-05-21

## 2022-05-21 RX ORDER — CYCLOBENZAPRINE HCL 10 MG
5 TABLET ORAL 3 TIMES DAILY PRN
Status: DISCONTINUED | OUTPATIENT
Start: 2022-05-21 | End: 2022-05-22

## 2022-05-21 RX ORDER — SODIUM CHLORIDE 9 MG/ML
250 INJECTION, SOLUTION INTRAVENOUS ONCE
Status: COMPLETED | OUTPATIENT
Start: 2022-05-21 | End: 2022-05-21

## 2022-05-21 RX ADMIN — TAMSULOSIN HYDROCHLORIDE 0.4 MG: 0.4 CAPSULE ORAL at 05:14

## 2022-05-21 RX ADMIN — METOPROLOL SUCCINATE 12.5 MG: 25 TABLET, EXTENDED RELEASE ORAL at 05:13

## 2022-05-21 RX ADMIN — ENOXAPARIN SODIUM 150 MG: 150 INJECTION SUBCUTANEOUS at 05:17

## 2022-05-21 RX ADMIN — LISINOPRIL 2.5 MG: 5 TABLET ORAL at 17:38

## 2022-05-21 RX ADMIN — WARFARIN SODIUM 2.5 MG: 2.5 TABLET ORAL at 17:40

## 2022-05-21 RX ADMIN — ALLOPURINOL 450 MG: 300 TABLET ORAL at 21:35

## 2022-05-21 RX ADMIN — OXYCODONE 5 MG: 5 TABLET ORAL at 03:59

## 2022-05-21 RX ADMIN — HYDROMORPHONE HYDROCHLORIDE 0.5 MG: 1 INJECTION, SOLUTION INTRAMUSCULAR; INTRAVENOUS; SUBCUTANEOUS at 15:37

## 2022-05-21 RX ADMIN — AMOXICILLIN AND CLAVULANATE POTASSIUM 1 TABLET: 875; 125 TABLET, FILM COATED ORAL at 17:38

## 2022-05-21 RX ADMIN — CYCLOBENZAPRINE 10 MG: 10 TABLET, FILM COATED ORAL at 05:12

## 2022-05-21 RX ADMIN — ALLOPURINOL 300 MG: 300 TABLET ORAL at 05:11

## 2022-05-21 RX ADMIN — HYDROMORPHONE HYDROCHLORIDE 0.5 MG: 1 INJECTION, SOLUTION INTRAMUSCULAR; INTRAVENOUS; SUBCUTANEOUS at 02:33

## 2022-05-21 RX ADMIN — LISINOPRIL 2.5 MG: 5 TABLET ORAL at 05:12

## 2022-05-21 RX ADMIN — HYDROMORPHONE HYDROCHLORIDE 0.5 MG: 1 INJECTION, SOLUTION INTRAMUSCULAR; INTRAVENOUS; SUBCUTANEOUS at 17:38

## 2022-05-21 RX ADMIN — OXYCODONE 5 MG: 5 TABLET ORAL at 21:36

## 2022-05-21 RX ADMIN — HYDROMORPHONE HYDROCHLORIDE 0.5 MG: 1 INJECTION, SOLUTION INTRAMUSCULAR; INTRAVENOUS; SUBCUTANEOUS at 10:24

## 2022-05-21 RX ADMIN — LORAZEPAM 1 MG: 2 INJECTION INTRAMUSCULAR; INTRAVENOUS at 13:31

## 2022-05-21 RX ADMIN — SODIUM CHLORIDE 250 ML: 9 INJECTION, SOLUTION INTRAVENOUS at 10:25

## 2022-05-21 RX ADMIN — HYDROMORPHONE HYDROCHLORIDE 0.5 MG: 1 INJECTION, SOLUTION INTRAMUSCULAR; INTRAVENOUS; SUBCUTANEOUS at 05:17

## 2022-05-21 RX ADMIN — ENOXAPARIN SODIUM 150 MG: 150 INJECTION SUBCUTANEOUS at 17:39

## 2022-05-21 RX ADMIN — CYCLOBENZAPRINE 10 MG: 10 TABLET, FILM COATED ORAL at 12:35

## 2022-05-21 RX ADMIN — AMOXICILLIN AND CLAVULANATE POTASSIUM 1 TABLET: 875; 125 TABLET, FILM COATED ORAL at 05:16

## 2022-05-21 RX ADMIN — OXYCODONE 5 MG: 5 TABLET ORAL at 00:12

## 2022-05-21 RX ADMIN — PRAZOSIN HYDROCHLORIDE 1 MG: 1 CAPSULE ORAL at 21:35

## 2022-05-21 ASSESSMENT — PAIN DESCRIPTION - PAIN TYPE
TYPE: ACUTE PAIN

## 2022-05-21 ASSESSMENT — CHA2DS2 SCORE
HYPERTENSION: YES
AGE 75 OR GREATER: YES
SEX: MALE
VASCULAR DISEASE: NO
CHA2DS2 VASC SCORE: 3
PRIOR STROKE OR TIA OR THROMBOEMBOLISM: NO
AGE 65 TO 74: NO
DIABETES: NO
CHF OR LEFT VENTRICULAR DYSFUNCTION: NO

## 2022-05-21 ASSESSMENT — FIBROSIS 4 INDEX: FIB4 SCORE: 2.48

## 2022-05-21 NOTE — PROGRESS NOTES
Received bedside report from ORIN Casiano, pt care assumed. VS WDL, pt assessment complete. Pt A&Ox4, c/o 4/10 pain at this time. POC discussed with pt and verbalizes no questions. Pt denies any additional needs at this time. Bed locked and in lowest position, bed alarm on. Pt educated on fall risk and verbalized understanding, call light within reach, hourly rounding initiated.

## 2022-05-21 NOTE — PROGRESS NOTES
"[facility] FAMILY MEDICINE PROGRESS NOTE        Attending:   Dr. Puente    Resident:   Herber Blanton M.D.    PATIENT:   Sim Osorio; [id number]; 1945    ID:   77 y.o. male with a history of subdural hematoma s/p craniotomy in 2018 admitted for syncope workup, and found to have severe aortic stenosis and rate controlled atrial fibrillation.    SUBJECTIVE:   Overnight, patient complained of 2 out of 10 chest pain described as pressure-like.  RN ordered a stat EKG showing V. fib and a left bundle branch block.  Per RN, there were no changes from last EKG and MD was not called.  This morning, patient states that the pain subsided after about 45 minutes and he has not experienced it since.  The patient states that he is still experiencing significant pain in his left knee, mainly \"on the inside.\"  He also says he thinks it might be related to his back pain, which he says is chronic but has been worsening over the last day. Additionally, the patient asks me to speak with his family member, whom he thinks is sitting in the room.  However, there is no one sitting in the room at this time.    A couple of hours later, this provider went back to the room, met with family members.  Per family, patient seems to have improved mentation, but they did think that he was confused early this morning.  Upon this revisit with the patient and family, the patient is alert and oriented x4 and he recalls that he was confused earlier this morning.  Plan of care was discussed with the patient and the family.  All questions answered.  No other questions at this time.    OBJECTIVE:  Vitals:    05/20/22 1928 05/20/22 2324 05/21/22 0321 05/21/22 0513   BP: 106/65 107/62 107/61 120/64   Pulse: 88 88 89 88   Resp: 16 16 18    Temp: 36.1 °C (96.9 °F) 36.4 °C (97.6 °F) 36.1 °C (97 °F)    TempSrc: Temporal Temporal Temporal    SpO2: 92% 93% 95%    Weight: (!) 164 kg (361 lb 5.3 oz)      Height:           Intake/Output Summary (Last 24 hours) at " 5/21/2022 0710  Last data filed at 5/21/2022 0600  Gross per 24 hour   Intake 1179.4 ml   Output 650 ml   Net 529.4 ml       PHYSICAL EXAM:  General: No acute distress, afebrile, resting comfortably  HEENT: NC/AT. EOMI.   Cardiovascular: Irregularly irregular without murmurs. Normal capillary refill   Respiratory: CTAB  Abdomen: soft, nontender, large abdominal habitus, nondistended, no masses  EXT:  STAPLES, no edema  Left lower extremity: No erythema or edema, tender to palpation at the level of the medial knee and at the level of the popliteal fossa; no masses or lesions, dorsalis pedis and posterior tibial pulse 2+ and femoral pulse 2+  Right lower extremity: Unremarkable  Skin: No erythema/lesions   Neuro: Non-focal    LABS:  Recent Labs     05/19/22 0101 05/20/22 0248 05/21/22  0143   WBC 12.2* 11.4* 9.7   RBC 4.44* 4.12* 4.42*   HEMOGLOBIN 12.9* 11.8* 12.7*   HEMATOCRIT 39.6* 36.8* 39.2*   MCV 89.2 89.3 88.7   MCH 29.1 28.6 28.7   RDW 46.0 46.0 45.4   PLATELETCT 170 187 216   MPV 10.7 11.4 11.5   NEUTSPOLYS 74.30* 68.50 70.80   LYMPHOCYTES 7.00* 11.80* 10.30*   MONOCYTES 16.80* 17.00* 16.30*   EOSINOPHILS 0.50 1.30 1.10   BASOPHILS 0.20 0.20 0.20     Recent Labs     05/19/22 0101 05/20/22 0248 05/21/22  0143   SODIUM 130* 135 132*   POTASSIUM 3.8 3.9 3.8   CHLORIDE 100 103 102   CO2 20 20 18*   BUN 22 21 28*   CREATININE 1.56* 1.57* 1.56*   CALCIUM 8.2* 8.3* 8.6   ALBUMIN 3.2 3.2  --      Estimated GFR/CRCL = Estimated Creatinine Clearance: 61.9 mL/min (A) (by C-G formula based on SCr of 1.56 mg/dL (H)).  Recent Labs     05/19/22 0101 05/20/22 0248 05/21/22  0143   GLUCOSE 132* 112* 117*     Recent Labs     05/18/22  1641 05/19/22  0101 05/20/22  0248 05/20/22  1424 05/21/22 0143   ASTSGOT  --  21 28  --   --    ALTSGPT  --  15 17  --   --    TBILIRUBIN  --  0.9 0.7  --   --    ALKPHOSPHAT  --  65 70  --   --    GLOBULIN  --  3.1 3.2  --   --    INR 1.35*  --   --  1.16* 1.28*             Recent Labs      05/18/22  1641 05/20/22  1424 05/21/22  0143   INR 1.35* 1.16* 1.28*   APTT 43.7*  --   --          IMAGING:  EC-ECHOCARDIOGRAM COMPLETE W/ CONT   Final Result      DX-KNEE 2- LEFT   Final Result      1. Mild anterolateral soft tissue swelling. No fracture, subluxation or joint effusion.   2. Tricompartmental left knee osteoarthritis, Kellgren Cam grade 4 involving the medial knee joint compartment.      CT-TSPINE W/O PLUS RECONS   Final Result         1.  No acute traumatic bony injury of the thoracic spine.   2.  Atherosclerosis and atherosclerotic coronary artery disease      CT-LSPINE W/O PLUS RECONS   Final Result         1.  No acute traumatic bony injury of the lumbar spine. Multilevel degenerative changes of lumbar spine diminish diagnostic sensitivity of this exam.   2.  Diverticulosis with hazy fat stranding adjacent to sigmoid colon suggesting changes of sigmoid diverticulitis.   3.  Atherosclerosis      CT-HEAD W/O   Final Result         1.  No acute intracranial abnormality.   2.  Left maxillary sinusitis changes   3.  Atherosclerosis.         MR-KNEE-W/O LEFT    (Results Pending)       MEDS:  Current Facility-Administered Medications   Medication Last Admin   • HYDROmorphone (Dilaudid) injection 0.5 mg 0.5 mg at 05/21/22 0517   • cyclobenzaprine (Flexeril) tablet 10 mg 10 mg at 05/21/22 0512   • lisinopril (PRINIVIL) tablet 2.5 mg 2.5 mg at 05/21/22 0512   • MD Alert...Warfarin per Pharmacy     • amoxicillin-clavulanate (AUGMENTIN) 875-125 MG per tablet 1 Tablet 1 Tablet at 05/21/22 0516   • enoxaparin (Lovenox) injection 150 mg 150 mg at 05/21/22 0517   • warfarin (COUMADIN) tablet 2.5 mg 2.5 mg at 05/20/22 1748   • nitroglycerin (NITROSTAT) tablet 0.4 mg     • oxyCODONE immediate-release (ROXICODONE) tablet 5 mg 5 mg at 05/21/22 0359   • metoprolol SR (TOPROL XL) tablet 12.5 mg 12.5 mg at 05/21/22 0513   • senna-docusate (PERICOLACE or SENOKOT S) 8.6-50 MG per tablet 2 Tablet 2 Tablet at  05/18/22 1741    And   • polyethylene glycol/lytes (MIRALAX) PACKET 1 Packet      And   • magnesium hydroxide (MILK OF MAGNESIA) suspension 30 mL      And   • bisacodyl (DULCOLAX) suppository 10 mg     • acetaminophen (Tylenol) tablet 650 mg 650 mg at 05/17/22 0956   • allopurinol (ZYLOPRIM) tablet 300 mg 300 mg at 05/21/22 0511   • prazosin (MINIPRESS) capsule 1 mg 1 mg at 05/17/22 2255   • tamsulosin (FLOMAX) capsule 0.4 mg 0.4 mg at 05/21/22 0514   • allopurinol (ZYLOPRIM) tablet 450 mg 450 mg at 05/20/22 2132       ASSESSMENT/PLAN:  This is a 77 year old male with history of  subdural hematoma s/p craniotomy in 2018, hypertension, BPH, and gout who presented after a syncopal event. He describes a history in which he has been constipated and dehydrated over the last few days, pointing towards reflex or orthostatic syncope; however, he also has atrial fibrillation on EKG. Additionally, he takes prazosin, lisinopril, and hydrochlorothiazide, which could also have led to the presentation of this episode due to their side effects. Given his history of subdural hematoma, probable paroxysmal atrial fibrillation, diverticulitis at advanced age and Etta syncope score, he has been admitted for further evaluation and treatment.     #Syncopal episode  #H/o subdural hematoma s/p craniotomy in 2018  #Severe, probable D1 grade, aortic stenosis  -CT head did not show acute bleed  -Cranial nerve exam at admission was WNL and he continues to have no focal deficits  -echocardiogram shows severe, probable low flow/low gradient aortic stenosis with EF of 30%  -Cardiology consulted, recommended discussing case with neurology for possibility of starting DOAC  -Neurology reviewed case, confirmed that DOAC could be started in this patient  - Per cardiology recommendation, heparin drip was started on 5/18/2020 2 in the afternoon  -  Heparin drip was stopped on 5/20/2022  - In discussion of risks and benefits of anticoagulants,  patient and family elected to start warfarin based on the recommendation from cardiology that the patient be on an anticoagulant during his outpatient work-up for aortic stenosis  - In discussion with Pharm.D., it was decided to bridge the patient with Lovenox during transition to warfarin  Plan:  -Fall precautions  -  Continue Lovenox bridge therapy with initiation of warfarin; this treatment was started on 5/20/2022  - Consult with palliative care is pending  -Outpatient work-up by structural cardiology and outpatient EP, going to occur at [facility] per cardiology; time and recommendations of the cardiology team greatly appreciated  -Continue telemetry monitoring     #Diverticulitis  -CT a/p shows diverticula with hazy fat stranding adjacent to the sigmoid colon  -Patient received a dose of zosyn in the ED  - Patient had been started on a full liquid diet, tolerated it well, and on 5/20/2022 the patient tolerated a regular diet quite well  - Patient began Augmentin on 5/20/2022; today is day 2 of a 4-day course, for a total of 7 full days of antibiotics  Plan:  -Continue regular diet  -Bowel prophylaxis regimen  -Augmentin, day 2 of 4     #Stage 3b CKD  #Hypotension  #Hypokalemia  -GFR 43 at admission, with BUN/Cr 30/1.61  -Likely prerenal due to dehydration, but there is possibility of obstruction due to BPH  - Creatinine of 1.56 today, stable from yesterday  Plan:  -Bladder scan protocol  -Continue BPH medications  -Renal US if worsening  -Monitor outpatient; if has >5 gfr decrease per year or gfr <30, refer to nephrology  -Avoid nephrotoxins, renally dose medications     #Atrial fibrillation  #Elevated troponin  -EKG in ED shows afib with rate 80  -Patient asymptomatic  -Troponin 33, with downtrend and no need for further testing  -ChadsVasc score: 4  Plan:  -  Continue Lovenox bridge and warfarin  - Warfarin dosing per pharmacy  -Patient to follow-up outpatient with structural cards and EP  -Telemetry  monitoring     #Left knee pain  #Chronic low back pain  -Osteoarthritis on x-ray imaging without fracture or subluxation  - The patient has experienced significant pain while inpatient, requiring scheduled 0.5 mg Dilaudid  - On 5/20, scheduled Flexeril was started; that evening and in the early morning hours, the patient felt confused and also experienced some chest pain  - MRI of the left knee has still not occurred, it is pending  - The patient has 2+ peripheral and femoral pulses and I do not have concern at this time for cellulitis or vascular cause; more likely differentials include osteoarthritic pain, gout flare, bursitis  Plan:  -Scheduled Dilaudid  -  As needed Flexeril instead of scheduled Flexeril  - Getting CRP and uric acid; this could be related to his gout flare  - MRI of left knee; MRI lumbar spine w/o     #BPH  -Continue home prazosin and tamsulosin  -Patient should have outpatient follow up regarding his prazosin, as it could contribute to his near syncopal episodes that he states he has fairly frequently     #Hypertension  -Holding lisinopril and HCTZ home doses  -Patient should have outpatient follow up regarding his ACEI and HCTZ, as they could contribute to his near syncopal episodes that he states he has fairly frequently     Social:  - On 5/18 and 5/19, discussions were held with patient and family present in the room regarding CODE STATUS and prognosis of severe aortic stenosis  - Patient would like to remain full code     Core Measures:   Fluids: none  Lines: PIV  Abx: Augmentin  DVT prophylaxis: Lovenox bridge to warfarin  Code Status: Full code     Disposition: Inpatient      Herber Blanton MD   PGY-2 Family Medicine Resident   [facility] [place]

## 2022-05-21 NOTE — PROGRESS NOTES
Patient started complaining of 2/10 chest pain described as a pressure like sensation mid-sternum without radiation. Placed order for stat EKG which showed V-Fib and a L BBB. No changes from last EKG.

## 2022-05-21 NOTE — PROGRESS NOTES
Inpatient Anticoagulation Service Note    Date: 2022  Reason for Anticoagulation: Atrial Fibrillation   [id number] VASc Score: 3  HAS-BLED Score: 4    Hemoglobin Value: (!) 12.7  Hematocrit Value: (!) 39.2  Lab Platelet Value: 216  Target INR: 2.0 to 3.0    INR from last 7 days     Date/Time INR Value    22 0143 1.28    22 1424 1.16    22 1641 1.35    22 0447 1.24        Dose from last 7 days     Date/Time Dose (mg)    22 1309 2.5    22 1428 2.5        Average Dose (mg):  (New start)  Significant Interactions: Antibiotics  Bridge Therapy: Yes    Reversal Agent Administered: Not Applicable  Comments: New start warfarin for history of AF, therapy day 2. CBC stable and no signs of active bleeding. INR sub-therapeutic today. DDI listed above. Cardiac diet ordered, with 50-75% intake charted. Given history of ICH will continue with 2.5mg po daily with repeat INR tomorrow. Note - given body habitus and PMH, and after discussion with patient/family, warfarin selected over OAC. Currently briding with therapeutic Lovenox, can likely discontinue once INR therapeutic (no need to wait 5 days, given no acute clot).    Plan:  2.5 mg daily  Education Material Provided?: No  Pharmacist suggested discharge dosin.5 mg daily with follow up INR within 48-72 hours     Oj Islas, PharmD

## 2022-05-21 NOTE — DISCHARGE PLANNING
[facility] Transitional Care Coordination    Referral from: Dr. Manning    Insurance Provider on Facesheet: [facility] Medicare HMO  Potential Rehab Diagnosis: Cardiac debility     Chart review indicates patient has on going medical management and and therapy needs to possibly meet inpatient rehab facility criteria with the goal of returning to community.    D/C support: Spouse      Physiatry consultation  per protocol.     Therapy notes reviewed Max assist of 2 for mobility. Will need to show more promise with therapy intervention. Will need to verify provider support for IRF level of care.      Thank you for the referral.

## 2022-05-21 NOTE — PROGRESS NOTES
END OF SHIFT    BM x1 today.  Gail x1 for left leg pain/spasms.  Pt refused to ambulate today due to pain.  Heparin gtt discontinued today & coumadin started.  pt AAOx4, family at bedside. pt resting, NADN. bed in lowest position & call light within reach.    Vitals:    05/20/22 1548   BP: 111/77   Pulse: 84   Resp: 16   Temp: 37.1 °C (98.8 °F)   SpO2: 93%     Stephenie Boucher RN

## 2022-05-21 NOTE — PALLIATIVE CARE
"Palliative Care follow-up  PC RN discussed pt with Dr. Blanton via phone. Appreciate updates. PC RN went to bedside and met with Sim, his wife Jackie, daughter Tati, and granddaughter Deshawn. Family reported that pt had ativan for MRI which has made him \"a little loopy.\" Plan made for PC RN to re-visit for consult at 10:30am tomorrow. All questions answered.       Updated: MD and RN    Plan: 10:30am meeting 5/21.     Thank you for allowing Palliative Care to support this patient and family. Contact [phone] for additional assistance, change in patient status, or with any questions/concerns.   "

## 2022-05-21 NOTE — CARE PLAN
"The patient is Watcher - Medium risk of patient condition declining or worsening    Shift Goals  Clinical Goals: Pt will verbalize understanding of POC by end of shift.Patient will acheive adequate pain control throughout shift; 4/10 goal established with client.  Patient Goals: \"Pain managed and sleep.\"  Family Goals: CRIS    Progress made toward(s) clinical / shift goals: Pt is able to verbalize pain on a scale of 1-10 and is able to verbalize comfort goal. Pain management plan followed and pt educated on nonpharmacologic and pharmacological comfort measures. Provided patient with a verbal discussion related to POC. Client verbalized full understanding and had no questions or concerns.     Patient is not progressing towards the following goals: Pain remains relatively poorly managed without much relief despite aggressive pain management regimen.       Problem: Pain - Standard  Goal: Alleviation of pain or a reduction in pain to the patient’s comfort goal  Outcome: Not Progressing     "

## 2022-05-22 ENCOUNTER — APPOINTMENT (OUTPATIENT)
Dept: RADIOLOGY | Facility: MEDICAL CENTER | Age: 77
DRG: 306 | End: 2022-05-22
Attending: STUDENT IN AN ORGANIZED HEALTH CARE EDUCATION/TRAINING PROGRAM
Payer: COMMERCIAL

## 2022-05-22 LAB
ALBUMIN SERPL BCP-MCNC: 2.8 G/DL (ref 3.2–4.9)
ALBUMIN/GLOB SERPL: 0.8 G/DL
ALP SERPL-CCNC: 81 U/L (ref 30–99)
ALT SERPL-CCNC: 20 U/L (ref 2–50)
ANION GAP SERPL CALC-SCNC: 13 MMOL/L (ref 7–16)
AST SERPL-CCNC: 24 U/L (ref 12–45)
BASOPHILS # BLD AUTO: 0.4 % (ref 0–1.8)
BASOPHILS # BLD: 0.03 K/UL (ref 0–0.12)
BILIRUB SERPL-MCNC: 0.5 MG/DL (ref 0.1–1.5)
BUN SERPL-MCNC: 26 MG/DL (ref 8–22)
CALCIUM SERPL-MCNC: 8.5 MG/DL (ref 8.5–10.5)
CHLORIDE SERPL-SCNC: 103 MMOL/L (ref 96–112)
CO2 SERPL-SCNC: 17 MMOL/L (ref 20–33)
CREAT SERPL-MCNC: 1.23 MG/DL (ref 0.5–1.4)
EOSINOPHIL # BLD AUTO: 0.12 K/UL (ref 0–0.51)
EOSINOPHIL NFR BLD: 1.5 % (ref 0–6.9)
ERYTHROCYTE [DISTWIDTH] IN BLOOD BY AUTOMATED COUNT: 45.7 FL (ref 35.9–50)
GFR SERPLBLD CREATININE-BSD FMLA CKD-EPI: 60 ML/MIN/1.73 M 2
GLOBULIN SER CALC-MCNC: 3.3 G/DL (ref 1.9–3.5)
GLUCOSE SERPL-MCNC: 95 MG/DL (ref 65–99)
HCT VFR BLD AUTO: 38.4 % (ref 42–52)
HGB BLD-MCNC: 12.5 G/DL (ref 14–18)
IMM GRANULOCYTES # BLD AUTO: 0.13 K/UL (ref 0–0.11)
IMM GRANULOCYTES NFR BLD AUTO: 1.6 % (ref 0–0.9)
INR PPP: 1.25 (ref 0.87–1.13)
LYMPHOCYTES # BLD AUTO: 1.05 K/UL (ref 1–4.8)
LYMPHOCYTES NFR BLD: 13.2 % (ref 22–41)
MCH RBC QN AUTO: 29 PG (ref 27–33)
MCHC RBC AUTO-ENTMCNC: 32.6 G/DL (ref 33.7–35.3)
MCV RBC AUTO: 89.1 FL (ref 81.4–97.8)
MONOCYTES # BLD AUTO: 1.37 K/UL (ref 0–0.85)
MONOCYTES NFR BLD AUTO: 17.3 % (ref 0–13.4)
NEUTROPHILS # BLD AUTO: 5.23 K/UL (ref 1.82–7.42)
NEUTROPHILS NFR BLD: 66 % (ref 44–72)
NRBC # BLD AUTO: 0 K/UL
NRBC BLD-RTO: 0 /100 WBC
PLATELET # BLD AUTO: 223 K/UL (ref 164–446)
PMV BLD AUTO: 11.2 FL (ref 9–12.9)
POTASSIUM SERPL-SCNC: 3.8 MMOL/L (ref 3.6–5.5)
PROT SERPL-MCNC: 6.1 G/DL (ref 6–8.2)
PROTHROMBIN TIME: 15.4 SEC (ref 12–14.6)
RBC # BLD AUTO: 4.31 M/UL (ref 4.7–6.1)
SODIUM SERPL-SCNC: 133 MMOL/L (ref 135–145)
WBC # BLD AUTO: 7.9 K/UL (ref 4.8–10.8)

## 2022-05-22 PROCEDURE — 700102 HCHG RX REV CODE 250 W/ 637 OVERRIDE(OP): Performed by: STUDENT IN AN ORGANIZED HEALTH CARE EDUCATION/TRAINING PROGRAM

## 2022-05-22 PROCEDURE — 99232 SBSQ HOSP IP/OBS MODERATE 35: CPT | Mod: GC | Performed by: FAMILY MEDICINE

## 2022-05-22 PROCEDURE — A9270 NON-COVERED ITEM OR SERVICE: HCPCS | Performed by: PHYSICIAN ASSISTANT

## 2022-05-22 PROCEDURE — A9270 NON-COVERED ITEM OR SERVICE: HCPCS | Performed by: STUDENT IN AN ORGANIZED HEALTH CARE EDUCATION/TRAINING PROGRAM

## 2022-05-22 PROCEDURE — 700101 HCHG RX REV CODE 250: Performed by: STUDENT IN AN ORGANIZED HEALTH CARE EDUCATION/TRAINING PROGRAM

## 2022-05-22 PROCEDURE — 770020 HCHG ROOM/CARE - TELE (206)

## 2022-05-22 PROCEDURE — A9270 NON-COVERED ITEM OR SERVICE: HCPCS | Performed by: FAMILY MEDICINE

## 2022-05-22 PROCEDURE — 85610 PROTHROMBIN TIME: CPT

## 2022-05-22 PROCEDURE — 700102 HCHG RX REV CODE 250 W/ 637 OVERRIDE(OP): Performed by: FAMILY MEDICINE

## 2022-05-22 PROCEDURE — 700111 HCHG RX REV CODE 636 W/ 250 OVERRIDE (IP): Performed by: STUDENT IN AN ORGANIZED HEALTH CARE EDUCATION/TRAINING PROGRAM

## 2022-05-22 PROCEDURE — 700102 HCHG RX REV CODE 250 W/ 637 OVERRIDE(OP): Performed by: NURSE PRACTITIONER

## 2022-05-22 PROCEDURE — 71045 X-RAY EXAM CHEST 1 VIEW: CPT

## 2022-05-22 PROCEDURE — 36415 COLL VENOUS BLD VENIPUNCTURE: CPT

## 2022-05-22 PROCEDURE — A9270 NON-COVERED ITEM OR SERVICE: HCPCS | Performed by: NURSE PRACTITIONER

## 2022-05-22 PROCEDURE — 700102 HCHG RX REV CODE 250 W/ 637 OVERRIDE(OP): Performed by: PHYSICIAN ASSISTANT

## 2022-05-22 PROCEDURE — 80053 COMPREHEN METABOLIC PANEL: CPT

## 2022-05-22 PROCEDURE — 85025 COMPLETE CBC W/AUTO DIFF WBC: CPT

## 2022-05-22 RX ORDER — WARFARIN SODIUM 2.5 MG/1
2.5 TABLET ORAL
Status: DISCONTINUED | OUTPATIENT
Start: 2022-05-23 | End: 2022-05-24

## 2022-05-22 RX ORDER — ACETAMINOPHEN 500 MG
1000 TABLET ORAL EVERY 6 HOURS
Status: DISCONTINUED | OUTPATIENT
Start: 2022-05-22 | End: 2022-05-27 | Stop reason: HOSPADM

## 2022-05-22 RX ORDER — FUROSEMIDE 20 MG/1
20 TABLET ORAL
Status: DISCONTINUED | OUTPATIENT
Start: 2022-05-23 | End: 2022-05-27 | Stop reason: HOSPADM

## 2022-05-22 RX ORDER — OXYCODONE HYDROCHLORIDE 5 MG/1
2.5-5 TABLET ORAL EVERY 4 HOURS PRN
Status: DISCONTINUED | OUTPATIENT
Start: 2022-05-22 | End: 2022-05-23

## 2022-05-22 RX ORDER — LIDOCAINE 50 MG/G
1 PATCH TOPICAL EVERY 24 HOURS
Status: DISCONTINUED | OUTPATIENT
Start: 2022-05-22 | End: 2022-05-27 | Stop reason: HOSPADM

## 2022-05-22 RX ORDER — WARFARIN SODIUM 5 MG/1
5 TABLET ORAL
Status: DISCONTINUED | OUTPATIENT
Start: 2022-05-22 | End: 2022-05-24

## 2022-05-22 RX ORDER — POTASSIUM CHLORIDE 20 MEQ/1
20 TABLET, EXTENDED RELEASE ORAL DAILY
Status: DISCONTINUED | OUTPATIENT
Start: 2022-05-23 | End: 2022-05-27 | Stop reason: HOSPADM

## 2022-05-22 RX ORDER — KETOROLAC TROMETHAMINE 30 MG/ML
15 INJECTION, SOLUTION INTRAMUSCULAR; INTRAVENOUS EVERY 6 HOURS
Status: DISCONTINUED | OUTPATIENT
Start: 2022-05-22 | End: 2022-05-22

## 2022-05-22 RX ORDER — MORPHINE SULFATE 4 MG/ML
1 INJECTION INTRAVENOUS EVERY 4 HOURS PRN
Status: DISCONTINUED | OUTPATIENT
Start: 2022-05-22 | End: 2022-05-22

## 2022-05-22 RX ORDER — OXYCODONE HYDROCHLORIDE 5 MG/1
2.5-5 TABLET ORAL EVERY 4 HOURS PRN
Status: DISCONTINUED | OUTPATIENT
Start: 2022-05-22 | End: 2022-05-22

## 2022-05-22 RX ADMIN — ENOXAPARIN SODIUM 150 MG: 150 INJECTION SUBCUTANEOUS at 16:44

## 2022-05-22 RX ADMIN — PRAZOSIN HYDROCHLORIDE 1 MG: 1 CAPSULE ORAL at 20:44

## 2022-05-22 RX ADMIN — AMOXICILLIN AND CLAVULANATE POTASSIUM 1 TABLET: 875; 125 TABLET, FILM COATED ORAL at 05:33

## 2022-05-22 RX ADMIN — AMOXICILLIN AND CLAVULANATE POTASSIUM 1 TABLET: 875; 125 TABLET, FILM COATED ORAL at 16:43

## 2022-05-22 RX ADMIN — ALLOPURINOL 300 MG: 300 TABLET ORAL at 05:33

## 2022-05-22 RX ADMIN — METOPROLOL SUCCINATE 12.5 MG: 25 TABLET, EXTENDED RELEASE ORAL at 05:33

## 2022-05-22 RX ADMIN — ACETAMINOPHEN 1000 MG: 500 TABLET ORAL at 16:44

## 2022-05-22 RX ADMIN — OXYCODONE 5 MG: 5 TABLET ORAL at 20:44

## 2022-05-22 RX ADMIN — ALLOPURINOL 450 MG: 300 TABLET ORAL at 20:44

## 2022-05-22 RX ADMIN — TAMSULOSIN HYDROCHLORIDE 0.4 MG: 0.4 CAPSULE ORAL at 05:33

## 2022-05-22 RX ADMIN — WARFARIN SODIUM 5 MG: 5 TABLET ORAL at 16:44

## 2022-05-22 RX ADMIN — LISINOPRIL 2.5 MG: 5 TABLET ORAL at 16:43

## 2022-05-22 RX ADMIN — OXYCODONE 5 MG: 5 TABLET ORAL at 14:11

## 2022-05-22 RX ADMIN — ACETAMINOPHEN 1000 MG: 500 TABLET ORAL at 09:37

## 2022-05-22 RX ADMIN — OXYCODONE 5 MG: 5 TABLET ORAL at 09:44

## 2022-05-22 RX ADMIN — ENOXAPARIN SODIUM 150 MG: 150 INJECTION SUBCUTANEOUS at 05:34

## 2022-05-22 RX ADMIN — ACETAMINOPHEN 1000 MG: 500 TABLET ORAL at 23:15

## 2022-05-22 RX ADMIN — OXYCODONE 5 MG: 5 TABLET ORAL at 05:33

## 2022-05-22 RX ADMIN — LIDOCAINE 1 PATCH: 50 PATCH TOPICAL at 11:18

## 2022-05-22 RX ADMIN — SENNOSIDES AND DOCUSATE SODIUM 2 TABLET: 50; 8.6 TABLET ORAL at 05:33

## 2022-05-22 RX ADMIN — LISINOPRIL 2.5 MG: 5 TABLET ORAL at 05:33

## 2022-05-22 ASSESSMENT — PAIN DESCRIPTION - PAIN TYPE
TYPE: ACUTE PAIN

## 2022-05-22 NOTE — PROGRESS NOTES
END OF SHIFT    Pt had intermittent confusion & visual hallucinations throughout afternoon.  MRI of knee & lumbar spine today - awaiting results.  Multiple loose BM's today.  pt AAOx2-4, family at bedside. pt resting, NADN. bed in lowest position & call light within reach.    Vitals:    05/21/22 1602   BP: 111/54   Pulse: 97   Resp: 18   Temp: 37 °C (98.6 °F)   SpO2: 95%     Stephenie Boucher RN

## 2022-05-22 NOTE — CONSULTS
"Reason for PC Consult: Advance Care Planning    Consulted by: Dr. Blanton    Assessment:  General:   77 y.o. male with a history of subdural hematoma s/p craniotomy in 2018, hypertension, BPH, and gout who presented after syncopal event. 5 days ago he began to feel weak, continued to be constipated from changes in home Colace, and developed a lack of appetite.  He also had left lower quadrant intermittent, burning abdominal pain that was nonradiating. Pt had a GLF at home prior to admission, it was felt to be a syncopal episode. The patient told admitting MD that he does not have any cardiac history besides hypertension, and has never had a consultation with a cardiologist. Pt denied ever being told that he has ever had a cardiac arrhythmia. Per [facility] note, \"This admission his echocardiogram showed a reduced EF of 30% with global hypokinesis, probable low flow low gradient severe aortic valve stenosis,  aortic valve area calculated from the continuity equation is 0.7 cm2, Vmax is 3.9 m/s. Mean: 38 mmHg. He was in atrial fibrillation, rate controlled, with known LBBB. This admission he has been started on anticoagulation after discussing case with neurology.\" Plan for outpatient follow up with structural cardiology.     Dyspnea: Yes  Last BM: 05/22/22  Pain: Yes- Knee pain  Depression: Mood appropriate for situation  Dementia: No    Spiritual:  Is Yazdanism or spirituality important for coping with this illness? No  Has a  or spiritual provider visit been requested? No    Palliative Performance Scale: 50%    Advance Directive: created at this encounter.   DPOA: pt named his wife Jackie.  POLST: created at this encounter. POLST reflects DNR and selective treatment.     Code Status: updated to DNR/DNI during this discussion.     Social: pt lives in a handicap accessible apartment with his wife Jackie. Pt's daughter Tati and several grandchildren live locally/provide support. Pt's son Darrell " "lives in [place] and daughter Malia lives in [place].     Outcome:  Introduced self and role of Palliative Care to Sim, his wife Jackie, daughters Tati/Reji, son Darrell, and granddaughter Maria Del Carmen (she is an RN on [employer]). Pt's son in law River and granddaughter Seema joined discussion midway. Assessed pt's understanding of current medical status, overall health picture, and options for future care. Pt explains the circumstances behind his fall and reports that he has been told that his \"heart is beginning to fail\" since his admit. This was new information for the patient. PC RN provided education about pt's CHF, afib, and aortic stenosis. Pt verbalized understanding. Pt has required assistance from his wife and family since his hemorraghic strokes several years ago. Pt reports spending most his day in his recliner. Pt utilizes a power chair to mobilize around his apartment.     Explored pt's values, beliefs, and preferences in order to identify GOC. Pt is interested it determining his options in outpatient setting. Pt recognizes that he may not be a candidate for TAVR which would likely impact his overall outcomes. PC RN queried if pt has completed Advance Directives or discussed healthcare wishes with his family in the past. Pt has not, but is interested in doing so while in the hospital. Reviewed and completed AD. Named his wife Jackie as DPOA and his dtr Tati as alternate. Pt chose #2,3,5 on statements of desires, meaning that she would not want life-prolonging treatments to be provided, if there was no reasonable hope of recovery, long-term survival, or if the patient was in an irreversible coma. Patient would also want DPOA-HC to consider quality of life, relief of suffering, and preservation of function. PC RN explains hospice as future care option depending on goals of care and options available from structural cardiology. PC RN describes the extra layer of support, symptom management, and focusing on " quality of life. Pt and family express gratitude for information.     Discussed code status in detail including mechanical ventilation and what resuscitation looks like. After detailed discussion, pt decided that he would not want CPR or intubation. Pt's family all in agreement to DNR/DNI. POLST created which reflects DNR and selective treatment.     Spiritual visit offered, but declined. Active listening, reflection, reminiscing, validation & normalization, and empathic support utilized throughout this encounter.  All questions answered.  PC contact information given.         Updated: CECE Pavon and Dr. Keys    Plan: AD and POLST complete. DNR/DNI. Plan for outpatient cardiology follow up.       Thank you for allowing Palliative Care to participate in this patient's care. Please feel free to call [phone] with any questions or concerns.

## 2022-05-22 NOTE — PROGRESS NOTES
Bedside report received from NOC RN. Assumed care of pt. Pt awake, laying in bed. A/Ox4, VSS. No concerns, complaints or distress. Pt educated to call before getting out of bed. POC reviewed and white board updated. Tele box on. Afib 90 on the monitor. Call light in reach. Bed locked in lowest position with 2 upper bed rails up. Bed alarm on.

## 2022-05-22 NOTE — CARE PLAN
"The patient is Stable - Low risk of patient condition declining or worsening    Shift Goals  Clinical Goals: Pt will verbalize understanding of POC by end of shift.Patient will acheive adequate pain control throughout shift; 4/10 goal established with client.  Patient Goals: \"Pain managed and sleep.\"      Problem: Pain - Standard  Goal: Alleviation of pain or a reduction in pain to the patient’s comfort goal  Outcome: Progressing  Note: Utilize pain scale and administer medications as ordered.      Problem: Knowledge Deficit - Standard  Goal: Patient and family/care givers will demonstrate understanding of plan of care, disease process/condition, diagnostic tests and medications  Outcome: Progressing  Note: Provided patient and family members education on POC and medications for the shift. Patient in agreement and verbalized understanding.      Problem: Fall Risk  Goal: Patient will remain free from falls  Outcome: Progressing  Note: Bed alarm on. Bed in low position and call light within reach     Problem: Skin Integrity  Goal: Skin integrity is maintained or improved  Outcome: Progressing  Note: Rotate patient from side to side.      "

## 2022-05-22 NOTE — PROGRESS NOTES
Inpatient Anticoagulation Service Note    Date: 2022  Reason for Anticoagulation: Atrial Fibrillation   [id number] VASc Score: 3  HAS-BLED Score: 4    Hemoglobin Value: (!) 12.5  Hematocrit Value: (!) 38.4  Lab Platelet Value: 223  Target INR: 2.0 to 3.0    INR from last 7 days     Date/Time INR Value    22 0229 1.25    22 0143 1.28    22 1424 1.16    22 1641 1.35    22 0447 1.24        Dose from last 7 days     Date/Time Dose (mg)    22 1518 5    22 1309 2.5    22 1428 2.5        Average Dose (mg):  (New start)  Significant Interactions: Antibiotics  Bridge Therapy: Yes (If less than 5 days and overlap therapy discontinued -- document reason (i.e. Bleed Risk))    (If still on overlap therapy, if No -- document reason (i.e. Bleed Risk))    Reversal Agent Administered: Not Applicable  Comments: New start warfarin for history of AF, therapy day 3. CBC stable and no signs of active bleeding. INR sub-therapeutic today. DDI listed above. Cardiac diet ordered, with 50-75% intake charted. Pt has history of ICH, but given INR continues to be sub-therapeutic and has had little increase after two doses will bolus with 5mg today with repeat INR tomorrow. Note - given body habitus and PMH, and after discussion with patient/family, warfarin selected over OAC. Currently briding with therapeutic Lovenox, can likely discontinue once INR therapeutic (no need to wait 5 days, given no acute clot).    Plan: 5mg bolus today  Education Material Provided?: No  Pharmacist suggested discharge dosinmg on Sun, Tue, and Thu then 2.5mg all other days with follow up INR within 48-72 hours of discharge.      Oj Islas, GabrielD

## 2022-05-22 NOTE — PROGRESS NOTES
Colonoscopy Procedure Note      Patient: Evan Leal  : 1951  Acct#:     Procedure: Colonoscopy with polypectomy (cold snare)    Date:  2021    Surgeon:  Rox Reddy MD    Referring Physician:  COSME Suh MD    Previous Colonoscopy: YES  Date:   Greater than 3 years: YES    Preoperative Diagnosis:  1. Surveillance     Postoperative Diagnosis:  1. Transverse Colon Polyps 2. Moderate/Severe Left Colon Diverticulosis. Consent:  The patient or their legal guardian has signed a consent, and is aware of the potential risks, benefits, alternatives, and potential complications of this procedure. These include, but are not limited to hemorrhage, bleeding, post procedural pain, perforation, phlebitis, aspiration, hypotension, hypoxia, cardiovascular events such as arryhthmia, and possibly death. Additionally, the possibility of missed colonic polyps and interval colon cancer was discussed in the consent. Anesthesia:  The patient was administered IV propofol per anesthesiology team.  Please see their operative records for full details. Procedure: An informed consent was obtained from the patient after explanation of indications, benefits, possible risks and complications of the procedure. The patient was then taken to the endoscopy suite, placed in the left lateral decubitus position, and the above IV anesthesia was administered. A digital rectal examination was performed and revealed negative without mass, lesions or tenderness, internal hemorrhoids noted. The Olympus video colonoscope was placed in the patient's rectum under digital direction and advanced to the cecum. The cecum was identified by characteristic anatomy and ballottment. The preparation was excellent. The ileocecal valve was identified. The scope was then withdrawn back through the cecum, ascending, transverse, descending, sigmoid colon, and rectum.   Careful circumferential examination of the Monitor summary:  Afib 77-90  (R) PVC  -/.10/-           mucosa in these areas demonstrated:    1. A 3 mm polyp in the transverse colon removed completely with cold snare polypectomy  2. A 3 mm polyp in the transverse colon removed completely with cold snare polypectomy  3. A 5 mm polyp in the transverse colon removed completely with cold snare polypectomy  4. A 4 mm polyp in the transverse colon removed completely with cold snare polypectomy  5. A 3 mm polyp in the transverse colon removed completely with cold snare polypectomy  6. Moderate/Severe Left Colon Diverticulosis. The scope was then withdrawn into the rectum and retroflexed. The retroflexed view of the anal verge and rectum demonstrates internal hemorrhoids. The scope was straightened, the colon was decompressed and the scope was withdrawn from the patient. The patient tolerated the procedure well and was taken to the PACU in good condition. Estimated Blood Loss (mL): <5 CC     Complications: None    Specimens:   ID Type Source Tests Collected by Time Destination   A : Transverse Colon Polyps x2 Tissue Tissue SURGICAL PATHOLOGY Garcia Potter MD 5/20/2021 0932        Impression:  See post-procedure diagnoses. Recommendations:   1. Await pathology results. 2. Recommend repeat colonoscopy in 3 years for surveillance purposes. 3. The patient had biopsies taken today. The patient should call for results in 7 days if they have not heard from our office. Our number is [phone].     LAW Klein [address] and Penny Sanchez [address]  5/20/2021  [phone]

## 2022-05-22 NOTE — PROGRESS NOTES
[facility] MEDICINE PROGRESS NOTE        Attending:   Mario Alberto Puente MD    Resident:   Maria R Keys M.D.    PATIENT:   Sim Osorio; [id number]; 1945    ID:   77 y.o. male admitted for syncope found to have severe AS with poor EF, inpatient for medical optimization and placement    SUBJECTIVE:   Overnight had reaction to ativan given preexam, unable to stay still in MRI with delirium type symptoms the remainder of the night. Woke up clear this AM, anxious to not have that happen again, in pain this AM.     OBJECTIVE:  Vitals:    05/1945 05/21/22 2356 05/22/22 0446 05/22/22 0745   BP: 104/52 102/51 102/52 102/53   Pulse: 83 89 87 90   Resp: 18 18 18 18   Temp: 36.9 °C (98.4 °F) 36.9 °C (98.4 °F) 36.8 °C (98.2 °F) 36.8 °C (98.2 °F)   TempSrc: Temporal Temporal Temporal Temporal   SpO2: 96% 96% 95% 92%   Weight: (!) 165 kg (363 lb 12.1 oz)      Height:           Intake/Output Summary (Last 24 hours) at 5/22/2022 0753  Last data filed at 5/22/2022 0546  Gross per 24 hour   Intake --   Output 200 ml   Net -200 ml       PHYSICAL EXAM:  General: No acute distress, afebrile, resting comfortably  HEENT: NC/AT. EOMI.   Cardiovascular: RRR +mumur. Normal capillary refill   Respiratory: CTAB  Abdomen: soft, nontender, nondistended, no masses  EXT:  STAPLES, nonpitting edema b/l right knee tender  Skin: No erythema/lesions   Neuro: Non-focal - motor limited by RL pain    LABS:  Recent Labs     05/20/22  0248 05/21/22  0143 05/22/22  0229   WBC 11.4* 9.7 7.9   RBC 4.12* 4.42* 4.31*   HEMOGLOBIN 11.8* 12.7* 12.5*   HEMATOCRIT 36.8* 39.2* 38.4*   MCV 89.3 88.7 89.1   MCH 28.6 28.7 29.0   RDW 46.0 45.4 45.7   PLATELETCT 187 216 223   MPV 11.4 11.5 11.2   NEUTSPOLYS 68.50 70.80 66.00   LYMPHOCYTES 11.80* 10.30* 13.20*   MONOCYTES 17.00* 16.30* 17.30*   EOSINOPHILS 1.30 1.10 1.50   BASOPHILS 0.20 0.20 0.40     Recent Labs     05/20/22  0248 05/21/22  0143 05/22/22  0229   SODIUM 135 132* 133*   POTASSIUM 3.9 3.8 3.8    CHLORIDE 103 102 103   CO2 20 18* 17*   BUN 21 28* 26*   CREATININE 1.57* 1.56* 1.23   CALCIUM 8.3* 8.6 8.5   ALBUMIN 3.2  --  2.8*     Estimated GFR/CRCL = Estimated Creatinine Clearance: 78.5 mL/min (by C-G formula based on SCr of 1.23 mg/dL).  Recent Labs     05/20/22  0248 05/21/22  0143 05/22/22 0229   GLUCOSE 112* 117* 95     Recent Labs     05/20/22  0248 05/20/22  1424 05/21/22  0143 05/22/22 0229   ASTSGOT 28  --   --  24   ALTSGPT 17  --   --  20   TBILIRUBIN 0.7  --   --  0.5   ALKPHOSPHAT 70  --   --  81   GLOBULIN 3.2  --   --  3.3   INR  --  1.16* 1.28* 1.25*             Recent Labs     05/20/22  1424 05/21/22 0143 05/22/22 0229   INR 1.16* 1.28* 1.25*         IMAGING:  MR-LUMBAR SPINE-W/O   Final Result      1.  Suboptimal examination secondary to patient motion artifact in inability to obtain axial images secondary to lack of patient cooperation.      2.  Minimal multilevel lumbar spondylotic change.      3.  Likely mild to moderate central canal stenosis at the L2-3 and L3-4 levels which is not adequately evaluated secondary to lack of axial imaging.      4.  No evidence of compression of the lower thoracic cord or conus medullaris.      MR-KNEE-W/O LEFT   Final Result         Limited exam due to motion artifact.      1. The MCL and ACL are not seen, likely torn.      2. Moderate knee joint effusion.      3. Severe tricompartmental osteoarthritis.      EC-ECHOCARDIOGRAM COMPLETE W/ CONT   Final Result      DX-KNEE 2- LEFT   Final Result      1. Mild anterolateral soft tissue swelling. No fracture, subluxation or joint effusion.   2. Tricompartmental left knee osteoarthritis, Kellgren Cam grade 4 involving the medial knee joint compartment.      CT-TSPINE W/O PLUS RECONS   Final Result         1.  No acute traumatic bony injury of the thoracic spine.   2.  Atherosclerosis and atherosclerotic coronary artery disease      CT-LSPINE W/O PLUS RECONS   Final Result         1.  No acute  traumatic bony injury of the lumbar spine. Multilevel degenerative changes of lumbar spine diminish diagnostic sensitivity of this exam.   2.  Diverticulosis with hazy fat stranding adjacent to sigmoid colon suggesting changes of sigmoid diverticulitis.   3.  Atherosclerosis      CT-HEAD W/O   Final Result         1.  No acute intracranial abnormality.   2.  Left maxillary sinusitis changes   3.  Atherosclerosis.         DX-CHEST-PORTABLE (1 VIEW)    (Results Pending)       MEDS:  Current Facility-Administered Medications   Medication Last Admin   • cyclobenzaprine (Flexeril) tablet 5 mg     • HYDROmorphone (Dilaudid) injection 0.5 mg 0.5 mg at 05/21/22 1738   • lisinopril (PRINIVIL) tablet 2.5 mg 2.5 mg at 05/22/22 0533   • MD Alert...Warfarin per Pharmacy     • amoxicillin-clavulanate (AUGMENTIN) 875-125 MG per tablet 1 Tablet 1 Tablet at 05/22/22 0533   • enoxaparin (Lovenox) injection 150 mg 150 mg at 05/22/22 0534   • warfarin (COUMADIN) tablet 2.5 mg 2.5 mg at 05/21/22 1740   • nitroglycerin (NITROSTAT) tablet 0.4 mg     • oxyCODONE immediate-release (ROXICODONE) tablet 5 mg 5 mg at 05/22/22 0533   • metoprolol SR (TOPROL XL) tablet 12.5 mg 12.5 mg at 05/22/22 0533   • senna-docusate (PERICOLACE or SENOKOT S) 8.6-50 MG per tablet 2 Tablet 2 Tablet at 05/22/22 0533    And   • polyethylene glycol/lytes (MIRALAX) PACKET 1 Packet      And   • magnesium hydroxide (MILK OF MAGNESIA) suspension 30 mL      And   • bisacodyl (DULCOLAX) suppository 10 mg     • acetaminophen (Tylenol) tablet 650 mg 650 mg at 05/17/22 0956   • allopurinol (ZYLOPRIM) tablet 300 mg 300 mg at 05/22/22 0533   • prazosin (MINIPRESS) capsule 1 mg 1 mg at 05/21/22 2135   • tamsulosin (FLOMAX) capsule 0.4 mg 0.4 mg at 05/22/22 0533   • allopurinol (ZYLOPRIM) tablet 450 mg 450 mg at 05/21/22 2135       ASSESSMENT/PLAN:    #Syncopal episode  #H/o subdural hematoma s/p craniotomy in 2018  #Severe, probable D1 grade, aortic stenosis  echocardiogram  shows severe, probable low flow/low gradient aortic stenosis with EF of 30%  Cardiology on board with neurology wanting DOAC-  heparin drip  5/18/2020-  5/20/2022  - In discussion of risks and benefits of anticoagulants, patient and family elected to start warfarin based on the recommendation from cardiology that the patient be on an anticoagulant during his outpatient work-up for aortic stenosis  - In discussion with Pharm.D., it was decided to bridge the patient with Lovenox during transition to warfarin    Plan:   -  Continue Lovenox bridge therapy with initiation of warfarin; this treatment was started on 5/20/2022  -Outpatient work-up by structural cardiology and outpatient EP, going to occur at [facility] per cardiology  -Continue telemetry      #Diverticulitis  -CT a/p shows diverticula with hazy fat stranding adjacent to the sigmoid colon  -Patient received a dose of zosyn in the ED  - tolerating diet but poor apetite- added boost  - Patient began Augmentin on 5/20/2022; total of 7 full days of antibiotics    Plan:  -Continue regular diet  -Bowel prophylaxis regimen  -Augmentin, day 3 of 4     #Stage 3b CKD  #Hypotension  #Hypokalemia  -GFR 43 at admission, with BUN/Cr 30/1.61   - 1.2 now  -Likely prerenal due to dehydration, consider BPH and and PVR   -Avoid nephrotoxins, renally dose medications      #Atrial fibrillation  -EKG in ED shows afib with rate 80  -Patient asymptomatic  -Troponin 33, with downtrend and no need for further testing - likely trop leak    Plan:  -  Continue Lovenox bridge and warfarin for AS  - Warfarin dosing per pharmacy  -Patient to follow-up outpatient with structural cards and EP  -Telemetry monitoring     #Left knee pain  #Chronic low back pain  #ligamental tears, degenerative breakdown, effusion  -Osteoarthritis on x-ray imaging without fracture or subluxation  - MRI was poor quality due to no contrast and movement, showed destruction of joint and effusion  Plan:    - oxy 2.5-5 q4  hr, can increase frequency, dilaudid    For breakthrough      -schedule tylenol, no NSAIDs due to h/o ICH while on anticoaguation     #BPH  -Continue home prazosin and tamsulosin     #Hypertension  -Holding lisinopril and HCTZ home doses - soft pressures, new CHF, will need careful balance of fluid and pressure.      Core Measures:   Fluids: none  Lines: PIV  Abx: Augmentin  DVT prophylaxis: Lovenox bridge to warfarin  Code Status: DNR/DNI     Disposition: Inpatient        Maria R Keys MD  PGY-3 Family Medicine Resident   [facility]

## 2022-05-23 LAB
ALBUMIN SERPL BCP-MCNC: 3 G/DL (ref 3.2–4.9)
ALBUMIN/GLOB SERPL: 0.9 G/DL
ALP SERPL-CCNC: 104 U/L (ref 30–99)
ALT SERPL-CCNC: 27 U/L (ref 2–50)
ANION GAP SERPL CALC-SCNC: 11 MMOL/L (ref 7–16)
AST SERPL-CCNC: 35 U/L (ref 12–45)
BASOPHILS # BLD AUTO: 0.3 % (ref 0–1.8)
BASOPHILS # BLD: 0.03 K/UL (ref 0–0.12)
BILIRUB SERPL-MCNC: 0.5 MG/DL (ref 0.1–1.5)
BUN SERPL-MCNC: 27 MG/DL (ref 8–22)
CALCIUM SERPL-MCNC: 8.5 MG/DL (ref 8.5–10.5)
CHLORIDE SERPL-SCNC: 102 MMOL/L (ref 96–112)
CO2 SERPL-SCNC: 19 MMOL/L (ref 20–33)
CREAT SERPL-MCNC: 1.28 MG/DL (ref 0.5–1.4)
EOSINOPHIL # BLD AUTO: 0.14 K/UL (ref 0–0.51)
EOSINOPHIL NFR BLD: 1.5 % (ref 0–6.9)
ERYTHROCYTE [DISTWIDTH] IN BLOOD BY AUTOMATED COUNT: 44.5 FL (ref 35.9–50)
GFR SERPLBLD CREATININE-BSD FMLA CKD-EPI: 57 ML/MIN/1.73 M 2
GLOBULIN SER CALC-MCNC: 3.4 G/DL (ref 1.9–3.5)
GLUCOSE SERPL-MCNC: 135 MG/DL (ref 65–99)
HCT VFR BLD AUTO: 38.1 % (ref 42–52)
HGB BLD-MCNC: 12.6 G/DL (ref 14–18)
IMM GRANULOCYTES # BLD AUTO: 0.17 K/UL (ref 0–0.11)
IMM GRANULOCYTES NFR BLD AUTO: 1.8 % (ref 0–0.9)
INR PPP: 1.2 (ref 0.87–1.13)
LYMPHOCYTES # BLD AUTO: 0.91 K/UL (ref 1–4.8)
LYMPHOCYTES NFR BLD: 9.6 % (ref 22–41)
MCH RBC QN AUTO: 28.6 PG (ref 27–33)
MCHC RBC AUTO-ENTMCNC: 33.1 G/DL (ref 33.7–35.3)
MCV RBC AUTO: 86.4 FL (ref 81.4–97.8)
MONOCYTES # BLD AUTO: 1.59 K/UL (ref 0–0.85)
MONOCYTES NFR BLD AUTO: 16.8 % (ref 0–13.4)
NEUTROPHILS # BLD AUTO: 6.65 K/UL (ref 1.82–7.42)
NEUTROPHILS NFR BLD: 70 % (ref 44–72)
NRBC # BLD AUTO: 0 K/UL
NRBC BLD-RTO: 0 /100 WBC
PLATELET # BLD AUTO: 239 K/UL (ref 164–446)
PMV BLD AUTO: 11 FL (ref 9–12.9)
POTASSIUM SERPL-SCNC: 3.7 MMOL/L (ref 3.6–5.5)
PROT SERPL-MCNC: 6.4 G/DL (ref 6–8.2)
PROTHROMBIN TIME: 14.9 SEC (ref 12–14.6)
RBC # BLD AUTO: 4.41 M/UL (ref 4.7–6.1)
SODIUM SERPL-SCNC: 132 MMOL/L (ref 135–145)
WBC # BLD AUTO: 9.5 K/UL (ref 4.8–10.8)

## 2022-05-23 PROCEDURE — 770020 HCHG ROOM/CARE - TELE (206)

## 2022-05-23 PROCEDURE — A9270 NON-COVERED ITEM OR SERVICE: HCPCS | Performed by: STUDENT IN AN ORGANIZED HEALTH CARE EDUCATION/TRAINING PROGRAM

## 2022-05-23 PROCEDURE — A9270 NON-COVERED ITEM OR SERVICE: HCPCS | Performed by: FAMILY MEDICINE

## 2022-05-23 PROCEDURE — 700101 HCHG RX REV CODE 250: Performed by: STUDENT IN AN ORGANIZED HEALTH CARE EDUCATION/TRAINING PROGRAM

## 2022-05-23 PROCEDURE — 80053 COMPREHEN METABOLIC PANEL: CPT

## 2022-05-23 PROCEDURE — 85610 PROTHROMBIN TIME: CPT

## 2022-05-23 PROCEDURE — 700111 HCHG RX REV CODE 636 W/ 250 OVERRIDE (IP): Performed by: STUDENT IN AN ORGANIZED HEALTH CARE EDUCATION/TRAINING PROGRAM

## 2022-05-23 PROCEDURE — A9270 NON-COVERED ITEM OR SERVICE: HCPCS | Performed by: PHYSICIAN ASSISTANT

## 2022-05-23 PROCEDURE — 97530 THERAPEUTIC ACTIVITIES: CPT

## 2022-05-23 PROCEDURE — 700102 HCHG RX REV CODE 250 W/ 637 OVERRIDE(OP): Performed by: STUDENT IN AN ORGANIZED HEALTH CARE EDUCATION/TRAINING PROGRAM

## 2022-05-23 PROCEDURE — 700102 HCHG RX REV CODE 250 W/ 637 OVERRIDE(OP): Performed by: NURSE PRACTITIONER

## 2022-05-23 PROCEDURE — 700102 HCHG RX REV CODE 250 W/ 637 OVERRIDE(OP): Performed by: FAMILY MEDICINE

## 2022-05-23 PROCEDURE — 700102 HCHG RX REV CODE 250 W/ 637 OVERRIDE(OP): Performed by: PHYSICIAN ASSISTANT

## 2022-05-23 PROCEDURE — 97535 SELF CARE MNGMENT TRAINING: CPT

## 2022-05-23 PROCEDURE — A9270 NON-COVERED ITEM OR SERVICE: HCPCS | Performed by: NURSE PRACTITIONER

## 2022-05-23 PROCEDURE — 36415 COLL VENOUS BLD VENIPUNCTURE: CPT

## 2022-05-23 PROCEDURE — 85025 COMPLETE CBC W/AUTO DIFF WBC: CPT

## 2022-05-23 RX ORDER — HYDROMORPHONE HYDROCHLORIDE 1 MG/ML
0.5 INJECTION, SOLUTION INTRAMUSCULAR; INTRAVENOUS; SUBCUTANEOUS ONCE
Status: COMPLETED | OUTPATIENT
Start: 2022-05-23 | End: 2022-05-23

## 2022-05-23 RX ORDER — OXYCODONE HYDROCHLORIDE 10 MG/1
10 TABLET ORAL
Status: COMPLETED | OUTPATIENT
Start: 2022-05-23 | End: 2022-05-23

## 2022-05-23 RX ORDER — PREDNISONE 20 MG/1
20 TABLET ORAL DAILY
Status: COMPLETED | OUTPATIENT
Start: 2022-05-23 | End: 2022-05-27

## 2022-05-23 RX ORDER — OXYCODONE HYDROCHLORIDE 5 MG/1
5 TABLET ORAL EVERY 6 HOURS PRN
Status: DISCONTINUED | OUTPATIENT
Start: 2022-05-23 | End: 2022-05-27 | Stop reason: HOSPADM

## 2022-05-23 RX ORDER — OXYCODONE HYDROCHLORIDE 5 MG/1
2.5-5 TABLET ORAL EVERY 4 HOURS PRN
Status: DISCONTINUED | OUTPATIENT
Start: 2022-05-23 | End: 2022-05-23

## 2022-05-23 RX ORDER — OXYCODONE HYDROCHLORIDE 5 MG/1
5 TABLET ORAL EVERY 4 HOURS PRN
Status: DISCONTINUED | OUTPATIENT
Start: 2022-05-23 | End: 2022-05-23

## 2022-05-23 RX ADMIN — FUROSEMIDE 20 MG: 20 TABLET ORAL at 06:34

## 2022-05-23 RX ADMIN — PRAZOSIN HYDROCHLORIDE 1 MG: 1 CAPSULE ORAL at 20:46

## 2022-05-23 RX ADMIN — ALLOPURINOL 300 MG: 300 TABLET ORAL at 06:34

## 2022-05-23 RX ADMIN — ACETAMINOPHEN 1000 MG: 500 TABLET ORAL at 06:33

## 2022-05-23 RX ADMIN — AMOXICILLIN AND CLAVULANATE POTASSIUM 1 TABLET: 875; 125 TABLET, FILM COATED ORAL at 06:34

## 2022-05-23 RX ADMIN — LIDOCAINE 1 PATCH: 50 PATCH TOPICAL at 11:09

## 2022-05-23 RX ADMIN — OXYCODONE 5 MG: 5 TABLET ORAL at 06:34

## 2022-05-23 RX ADMIN — POTASSIUM CHLORIDE 20 MEQ: 1500 TABLET, EXTENDED RELEASE ORAL at 06:33

## 2022-05-23 RX ADMIN — HYDROMORPHONE HYDROCHLORIDE 0.5 MG: 1 INJECTION, SOLUTION INTRAMUSCULAR; INTRAVENOUS; SUBCUTANEOUS at 01:04

## 2022-05-23 RX ADMIN — OXYCODONE 5 MG: 5 TABLET ORAL at 00:26

## 2022-05-23 RX ADMIN — ENOXAPARIN SODIUM 150 MG: 150 INJECTION SUBCUTANEOUS at 17:17

## 2022-05-23 RX ADMIN — TAMSULOSIN HYDROCHLORIDE 0.4 MG: 0.4 CAPSULE ORAL at 06:34

## 2022-05-23 RX ADMIN — ENOXAPARIN SODIUM 150 MG: 150 INJECTION SUBCUTANEOUS at 06:33

## 2022-05-23 RX ADMIN — METOPROLOL SUCCINATE 12.5 MG: 25 TABLET, EXTENDED RELEASE ORAL at 06:34

## 2022-05-23 RX ADMIN — LISINOPRIL 2.5 MG: 5 TABLET ORAL at 06:33

## 2022-05-23 RX ADMIN — ACETAMINOPHEN 1000 MG: 500 TABLET ORAL at 11:09

## 2022-05-23 RX ADMIN — WARFARIN SODIUM 2.5 MG: 2.5 TABLET ORAL at 17:18

## 2022-05-23 RX ADMIN — OXYCODONE 5 MG: 5 TABLET ORAL at 11:09

## 2022-05-23 RX ADMIN — ACETAMINOPHEN 1000 MG: 500 TABLET ORAL at 17:17

## 2022-05-23 RX ADMIN — OXYCODONE 5 MG: 5 TABLET ORAL at 15:38

## 2022-05-23 RX ADMIN — LISINOPRIL 2.5 MG: 5 TABLET ORAL at 17:18

## 2022-05-23 RX ADMIN — AMOXICILLIN AND CLAVULANATE POTASSIUM 1 TABLET: 875; 125 TABLET, FILM COATED ORAL at 17:17

## 2022-05-23 RX ADMIN — ALLOPURINOL 450 MG: 300 TABLET ORAL at 20:44

## 2022-05-23 RX ADMIN — PREDNISONE 20 MG: 20 TABLET ORAL at 11:09

## 2022-05-23 RX ADMIN — OXYCODONE HYDROCHLORIDE 10 MG: 10 TABLET ORAL at 20:45

## 2022-05-23 ASSESSMENT — COGNITIVE AND FUNCTIONAL STATUS - GENERAL
TURNING FROM BACK TO SIDE WHILE IN FLAT BAD: UNABLE
HELP NEEDED FOR BATHING: A LOT
MOVING TO AND FROM BED TO CHAIR: UNABLE
TOILETING: A LOT
MOVING FROM LYING ON BACK TO SITTING ON SIDE OF FLAT BED: UNABLE
SUGGESTED CMS G CODE MODIFIER MOBILITY: CN
DRESSING REGULAR LOWER BODY CLOTHING: A LOT
CLIMB 3 TO 5 STEPS WITH RAILING: TOTAL
MOBILITY SCORE: 6
DAILY ACTIVITIY SCORE: 16
DRESSING REGULAR UPPER BODY CLOTHING: A LITTLE
PERSONAL GROOMING: A LITTLE
WALKING IN HOSPITAL ROOM: TOTAL
STANDING UP FROM CHAIR USING ARMS: TOTAL
SUGGESTED CMS G CODE MODIFIER DAILY ACTIVITY: CK

## 2022-05-23 ASSESSMENT — ENCOUNTER SYMPTOMS
ORTHOPNEA: 0
DIZZINESS: 0
PALPITATIONS: 0
LOSS OF CONSCIOUSNESS: 0

## 2022-05-23 ASSESSMENT — FIBROSIS 4 INDEX: FIB4 SCORE: 2.23

## 2022-05-23 ASSESSMENT — PAIN DESCRIPTION - PAIN TYPE
TYPE: ACUTE PAIN

## 2022-05-23 ASSESSMENT — GAIT ASSESSMENTS: GAIT LEVEL OF ASSIST: UNABLE TO PARTICIPATE

## 2022-05-23 NOTE — THERAPY
I have reviewed discharge instructions with the patient. The patient verbalized understanding. Patient left ED via Discharge Method: ambulatory to Home with (self). Opportunity for questions and clarification provided. Patient given 0 scripts. No esign       To continue your aftercare when you leave the hospital, you may receive an automated call from our care team to check in on how you are doing. This is a free service and part of our promise to provide the best care and service to meet your aftercare needs.  If you have questions, or wish to unsubscribe from this service please call [phone]. Thank you for Choosing our [facility] Emergency Department. Physical Therapy   Daily Treatment     Patient Name: Sim Osorio  Age:  77 y.o., Sex:  male  Medical Record #: [id number]  Today's Date: 5/23/2022    Precautions: Fall Risk  Comments: LLE HKB unlocked    Assessment  PT re-consulted after order canceled by physician for continued work-up. Pt seen for PT treatment session at this time. Now dx with L MCL and ACL tear, was given HKB unlocked. Pt demos improvement since eval, still required max A but was able to maintain sitting balance on his own and was able to tolerate LLE in dependent position. Completed STS x2 with difficulty bringing trunk upright and unable to weight shift LLE. Recommend postacute placement for continued therapy prior to return home to maximize safety and functional independence. Will follow. Encouraged EOB for all meals to promote incr activity tolerance.     Plan  Continue current treatment plan.  DC Equipment Recommendations: Unable to determine at this time  Discharge Recommendations: Recommend post-acute placement for additional physical therapy services prior to discharge home       05/23/22 1521   Balance   Sitting Balance (Static) Fair +   Sitting Balance (Dynamic) Fair   Standing Balance (Static) Trace   Standing Balance (Dynamic) Trace   Weight Shift Sitting Poor   Weight Shift Standing Poor   Skilled Intervention Verbal Cuing;Tactile Cuing   Comments standing with 4WW   Gait Analysis   Gait Level Of Assist Unable to Participate   Weight Bearing Status no restrictions   Comments did complete STS x2 was max A   Bed Mobility    Supine to Sit Maximal Assist   Scooting Minimal Assist   Skilled Intervention Verbal Cuing;Tactile Cuing;Sequencing   Functional Mobility   Sit to Stand Maximal Assist   Bed, Chair, WC Transfer Unable to Participate   Skilled Intervention Verbal Cuing;Tactile Cuing;Sequencing;Postural Facilitation   Comments difficulty with weight acceptance LLE, unable to bring trunk upright d/t pain but does achieve full buttocks  clearance   Activity Tolerance   Comments able to remain sitting EOB following tx   Short Term Goals    Short Term Goal # 1 pt will perform supine <> sit with SPV in 6 visits to get in/out of bed at home   Goal Outcome # 1 goal not met   Short Term Goal # 2 pt will sit EOB 5 min without UE support with fair balance in 6 visits to participate in functional tasks   Goal Outcome # 2 Progressing as expected   Short Term Goal # 3 pt will perform STS with SPV in 6 visits for improved indep   Goal Outcome # 3 Goal not met   Short Term Goal # 4 pt will perform SPT with SPV in 6 visits for improved indep   Goal Outcome # 4 Goal not met   Short Term Goal # 5 pt will ambulate 50ft with FWW and SPV in 6 visits to access home   Goal Outcome # 5 Goal not met

## 2022-05-23 NOTE — DISCHARGE PLANNING
Insurance provider [facility] no out of network benefit [facility] is out of network. [facility] no longer following.

## 2022-05-23 NOTE — DISCHARGE PLANNING
Case Management Discharge Planning    Admission Date: 5/17/2022  GMLOS: 3.3  ALOS: 6    6-Clicks ADL Score: 14  6-Clicks Mobility Score: 6  PT and/or OT Eval ordered: Yes  Post-acute Referrals Ordered: Yes  Post-acute Choice Obtained: Yes  Has referral(s) been sent to post-acute provider:  Yes      Anticipated Discharge Dispo: Discharge Disposition: D/T to SNF with Medicare cert in anticipation of skilled care (03)    DME Needed: No    Action(s) Taken: RN CM discussed the case with Dr. Blanton.  Patient will need SNF placement.  Ortho has been consulted due to knee ligament tears.  Accepting SNFs so far are [facility] and [facility].    Escalations Completed: Provider    Medically Clear: No    Next Steps: Case coordination to follow up with SNF referrals once the patient is medically clear.    Barriers to Discharge: Medical clearance, Ortho consult    Is the patient up for discharge tomorrow: No

## 2022-05-23 NOTE — THERAPY
"Occupational Therapy  Daily Treatment     Patient Name: Sim Osorio  Age:  77 y.o., Sex:  male  Medical Record #: [id number]  Today's Date: 5/23/2022       Precautions: Fall Risk  Comments: WBAT LLE, pt with new ACL & MCL tear in hinged LLE brace unlocked    Assessment    Pt seen for OT tx.  Pt had MRI that revealed left MCL & ACL tear & is being tx in hinged knee brace.  Pt today was motivated to get OOB but did require Max A for supine to sit EOB.  Pt stood briefly with Max A x 2  Pt unable to weight shift or stand long enough to attempt transfer to chair of [facility].  Pt encouraged to sit EOB saab with [facility] staff.  At this time pt is requiring a lot of assist & he does not appear ready to D/C home with family.  Highly recommend Post Acute OT services prior to D/C home.    Plan    Continue current treatment plan.    DC Equipment Recommendations: Unable to determine at this time  Discharge Recommendations: Recommend post-acute placement for additional occupational therapy services prior to discharge home    Subjective    \"It feels so good to get OOB\"     Objective       05/23/22 1437   Cognition    Comments pt remains very motivated despite pain   Sitting Upper Body Exercises   Comments pt encouraged to perform BUE AROM while seated EOB   Other Treatments   Other Treatments Provided Long dicussion with pt & family on importance of trying to sit EOB daily.  Informed pt & family that he will benefit from Acute OT services prior to D/C home given his current level of function   Balance   Sitting Balance (Static) Fair +   Sitting Balance (Dynamic) Fair   Standing Balance (Static) Trace   Standing Balance (Dynamic) Dependent   Weight Shift Sitting Poor   Weight Shift Standing Absent   Bed Mobility    Supine to Sit Maximal Assist   Sit to Supine   (pt left sitting up on EOB)   Scooting Minimal Assist   Rolling Moderate Assist to Lt.   Activities of Daily Living   Eating Modified Independent   Grooming Supervision;Seated "   Bathing Moderate Assist   Upper Body Dressing Minimal Assist   Lower Body Dressing Maximal Assist   Toileting Maximal Assist   Functional Mobility   Sit to Stand Maximal Assist  (Max A x 2)   Bed, Chair, Wheelchair Transfer Unable to Participate   Toilet Transfers Unable to Participate   Comments pt limited by pain in his LLE in standing & became more fatigued with repetition   Patient / Family Goals   Patient / Family Goal #1 For the pain to improve   Goal #1 Outcome Progressing slower than expected   Short Term Goals   Short Term Goal # 1 Pt will demo LB dressing w/ min A   Goal Outcome # 1 Goal not met   Short Term Goal # 2 Pt will demo BSC txf w/ min A   Goal Outcome # 2 Goal not met   Short Term Goal # 3 Pt will demo seated grooming w/ SPV   Goal Outcome # 3 Progressing as expected

## 2022-05-23 NOTE — PROGRESS NOTES
Hinge knee brace fitted and applied to Left extremity, unlocked position. Pt tolerating at this time. Contact traction team if any questions.

## 2022-05-23 NOTE — FACE TO FACE
Face to Face Supporting Documentation - Home Health    The encounter with this patient was in whole or in part the primary reason for home health admission.    Date of encounter:   Patient:                    MRN:                       YOB: 2024  Sim Osorio  [id number]  1945     Home health to see patient for:  Physical Therapy evaluation and treatment and Occupational therapy evaluation and treatment    Skilled need for:  New onset medical diagnosis tear of PCL and MCL    Skilled nursing interventions to include:  Comment: Home PT/OT    Homebound status evidenced by:  Need the aid of supportive devices such as crutches, canes, wheelchairs or walkers or Needs the assistance of another person in order to leave the home. Leaving home requires a considerable and taxing effort. There is a normal inability to leave the home.    Community Physician to provide follow up care: Gabriela Bush M.D.     Optional Interventions? Yes, Details: Brace, Opioid pain medications, muscle relaxants      I certify the face to face encounter for this home health care referral meets the CMS requirements and the encounter/clinical assessment with the patient was, in whole, or in part, for the medical condition(s) listed above, which is the primary reason for home health care. Based on my clinical findings: the service(s) are medically necessary, support the need for home health care, and the homebound criteria are met.  I certify that this patient has had a face to face encounter by myself.  Herber Blanton M.D. - NPI: [id number]

## 2022-05-23 NOTE — PROGRESS NOTES
Keshawn Hines with [facility] was contacted due to patient having uncontrolled pain. Patient rated pain at 9/10 after two doses of the PRN oxycodone. Orders were placed. Will continue to monitor patient.

## 2022-05-23 NOTE — PROGRESS NOTES
Inpatient Anticoagulation Service Note    Date: 5/23/2022    Reason for Anticoagulation: Atrial Fibrillation   Target INR: 2.0 to 3.0  [id number] VASc Score: 3  HAS-BLED Score: 4   Hemoglobin Value: (!) 12.6  Hematocrit Value: (!) 38.1  Lab Platelet Value: 239    INR from last 7 days     Date/Time INR Value    05/23/22 0100 1.2    05/22/22 0229 1.25    05/21/22 0143 1.28    05/20/22 1424 1.16    05/18/22 1641 1.35    05/17/22 0447 1.24        Dose from last 7 days     Date/Time Dose (mg)    05/23/22 1414 2.5    05/22/22 1518 5    05/21/22 1309 2.5    05/20/22 1428 2.5        Average Dose (mg):  (New start)  Significant Interactions: Antibiotics, Corticosteroids, Allopurinol  Bridge Therapy: Yes  INR Value Greater than 2 Prior to Discontinuation of Parenteral Anticoagulation: Not Applicable     Reversal Agent Administered: Not Applicable  Comments: INR remains sub-therapeutic; however, patient received bolus dose of warfarin yesterday and has only been on warfarin for 3 days. DDI noted above. Significant DDI noted with prednisone (medication started today) although short course expected. Remains on Lovenox bridge per MD. Warfarin counseling provided to patient. Discussed s/x of bleeding and clots, DDI, food/drug interactions. Paitent expressed understanding and had no questions. Provided with patient education pamphelt. Will give warfarin 2.5 mg PO and repeat INR in AM.    Plan:  Warfarin 2.5 mg PO, repeat INR in AM  Education Material Provided?: Yes (Provided on 5/23/22)  Pharmacist suggested discharge dosing: Would recommend warfarin 5mg PO q Sun, Tue, and Thu warfarin 2.5mg all other days with follow up INR within 24 hours of discharge.           Liane Pierre, PharmD

## 2022-05-23 NOTE — DOCUMENTATION QUERY
"                                                                         [facility]                                                                       Query Response Note      PATIENT:               DAVID ANDREA  ACCT #:                  [id number]  MRN:                     [id number]  :                      1945  ADMIT DATE:       2022 3:07 AM  DISCH DATE:          RESPONDING  PROVIDER #:        [id number]           QUERY TEXT:    HFrEF/Systolic heart failure is documented in the Medical Record. Please document/clarify acuity (includes probable or suspected). Thank you. Please call me for any questions.   CDI Specialist - Briana Pulido RN BSN CCDS - cell [phone]      The patient's Clinical Indicators include:  Systolic heart failure/HFrEF is noted in the Medical Record, as well as \"GDMT for HF; low dose and slow titration due to AS\"    ECHO - EF 30%  Options provided:   -- *** Note: Please review the listed options. If an applicable response is not listed. Please provide further clarification and specificity by documenting the desired response in a new note.   -- HFpEF decompensated   -- HFpEF, chronic   -- HFrEF decompensated   -- HFrEF, chronic   -- Acute Systolic heart failure   -- Chronic Systolic heart failure   -- Acute on Chronic Systolic heart failure   -- Unable to determine      Query created by: Briana Pulido on 2022 3:17 PM    RESPONSE TEXT:    Acute Systolic heart failure          Electronically signed by:  KELIN SOTO MD 2022 11:20 AM              "

## 2022-05-23 NOTE — PROGRESS NOTES
[facility] Cardiology Progress Note    Name:   Sim Osorio   YOB: 1945  Age:   77 y.o.  male   MRN:   [id number]    Consulting Cardiologist: Dr. Bentley    Chief Complaint: Syncope    History of Present Illness:  Sim is a 78yo male without any known cardiac problems, history of (?spontaneous) intracranial hemorrhage 2018 who had 5 days of flu-like symptoms and diarrhea, then on 5/17/22 when he was in the bathroom he syncopized, his wife found him on the ground and he was brought to [facility] ER for evaluation.     This admission his echocardiogram showed a reduced EF of 30% with global hypokinesis, probable low flow low gradient severe aortic valve stenosis,  aortic valve area calculated from the continuity equation is 0.7 cm2, Vmax is 3.9 m/s. Mean: 38 mmHg. He was in atrial fibrillation, rate controlled, with known LBBB. This admission he has been started on anticoagulation after discussing case with neurology.     With regards to his syncopal episode.  He had had several days of diarrhea and general malaise.  He has been getting in and out of bed all night to use the bathroom he did not specifically say he felt lightheaded or dizziness upon standing.  He did not have any palpitations or chest pain prior to the syncopal episode.  This is the first time he has ever had a syncopal episode.    Prior to this viral illness he had been in his usual state of health, he uses a walker for ambulation and lives with his wife in a handicap apartment.  He does get short of breath upon mild exertion such as walking from one end of the apartment to the other.  He does not recall having chest pain prior to the admission.  He has never been told about aortic stenosis or atrial fibrillation.      Interim Events:   Sim's knee pain is better controlled. He is waiting to find out where he might go after discharge. He is laying in bed supine, he is not wearing oxygen, he denies orthopnea, denies leg swelling,  abdominal bloating. He has not had any presyncope this admission. His wife is at bedside    Remains in AF on telemetry, rate controlled    Review of Systems   Constitutional: Negative for malaise/fatigue.   Cardiovascular: Negative for chest pain, palpitations, orthopnea and leg swelling.   Genitourinary: Negative for frequency.   Musculoskeletal: Positive for joint pain.   Neurological: Negative for dizziness and loss of consciousness.         Past Surgical History:   Procedure Laterality Date   • HERNIA REPAIR     • OTHER ORTHOPEDIC SURGERY      rt rotator cuff,  leftknee        History reviewed. No pertinent family history.    Social History     Tobacco Use   Smoking Status Never Smoker   Smokeless Tobacco Not on file       No Known Allergies      Medications   No current facility-administered medications on file prior to encounter.     Current Outpatient Medications on File Prior to Encounter   Medication Sig Dispense Refill   • allopurinol (ZYLOPRIM) 300 MG Tab Take 300-450 mg by mouth 2 times a day. 300 mg in the AM  450 mg in the PM     • acetaminophen (TYLENOL) 500 MG Tab Take 500 mg by mouth every morning.     • docusate sodium (COLACE) 100 MG Cap Take 100 mg by mouth every day.     • lisinopril (PRINIVIL) 10 MG Tab Take 1 Tablet by mouth every day. 90 Tablet 3   • hydroCHLOROthiazide (HYDRODIURIL) 25 MG Tab Take 1 Tablet by mouth every day. 90 Tablet 3   • tamsulosin (FLOMAX) 0.4 MG capsule TAKE 1 CAPSULE BY MOUTH 30 MINUTES AFTER BREAKFAST FOR 60 DAYS (Patient taking differently: Take 0.4 mg by mouth every day.) 60 Capsule 0   • prazosin (MINIPRESS) 1 MG Cap Take 1 Capsule by mouth every evening. 1 po at night time 30 Capsule 6           Physical Exam  Vitals:    05/23/22 1150   BP: 102/55   Pulse: 78   Resp: 18   Temp: 36.4 °C (97.6 °F)   SpO2: 96%     Physical Exam  Vitals reviewed.   Constitutional:       Appearance: Normal appearance.   Neck:      Comments: JVD is difficult to assess  Cardiovascular:       Rate and Rhythm: Normal rate. Rhythm irregular.      Heart sounds: Murmur heard.   Neurological:      Mental Status: He is alert.       Labs (personally reviewed):     Lab Results   Component Value Date/Time    SODIUM 132 (L) 05/23/2022 01:00 AM    POTASSIUM 3.7 05/23/2022 01:00 AM    CHLORIDE 102 05/23/2022 01:00 AM    CO2 19 (L) 05/23/2022 01:00 AM    GLUCOSE 135 (H) 05/23/2022 01:00 AM    BUN 27 (H) 05/23/2022 01:00 AM    CREATININE 1.28 05/23/2022 01:00 AM    CREATININE 1.1 03/26/2005 03:25 AM     Lab Results   Component Value Date/Time    CHOLSTRLTOT 148 09/18/2011 07:19 PM    LDL 91 09/18/2011 07:19 PM    HDL 41 09/18/2011 07:19 PM    TRIGLYCERIDE 78 09/18/2011 07:19 PM     Lab Results   Component Value Date/Time    BNPBTYPENAT 30 09/18/2011 06:20 PM         Cardiac Imaging and Procedures Review:      Personal Telemetry Review: AF rates , BBB    Echo 5/17/22:  CONCLUSIONS  Prior study 7-5-2010, compared to the report of the prior study, there   has been reduction of LVSF and development of aortic stenosis.   Moderate to severely reduced left ventricular systolic function.  The left ventricular ejection fraction is visually estimated to be 30%.  Global hypokinesis.  Probable low flow low gradient severe aortic valve stenosis.  Aortic valve area calculated from the continuity equation is 0.7 cm2.  Vmax is 3.9 m/s. Transvalvular gradients are - Peak: 61 mmHg, Mean: 38 mmHg.      Assessment and Medical Decision Making:  Syncope (presenting symptom)  Severe Aortic Stenosis, likely low flow low gradient severe,   Compensated Valvular Heart failure with reduced EF Stage C NYHA class III  - he is comfortable in bed without supplemental oxygen, his volume status is difficult to assess but symptomatically he is euvolemic. I recommend maintenance diuresis with furosemide 20mg. If he tolerates this then can add spironolactone 12.5mg, for now spironolactone is held due to hypotension but can be added on if his  blood pressures remain over 110mmHg    - metop SR 12.5mg  - lisinopril 2.5, can increase to bid today  - he is not an CRT/ICD candidate at this time, after ischemic evaluation, TAVR evaluation and 3mo of therapy this can be reassessed.   - he will need an ischemic evaluation prior to TAVR as an outpatient  - Case was discussed with TAVR team yesterday who recommend outpatient follow up and the most likely cause of syncope is dehydration in setting of GI illness    Atrial Fibrillation, new diagnosis this admission  - suspect this is a chronic finding, he has untreated sleep apnea. He is well rate controlled on Metop SR 12.5mg, avoid too much beta blockade/negative inotropes in this patient  - Continue anticoagulation, [id number] 5. He does not have an indication for bridging    Recommend patient continues with rehab to regain his physical strength and improve his knee pain. Due to insurance issues he will need to be scheduled with [facility]iology, ideally within 7 days of discharge   Please see Dr. Verma's attestation for additions and further recommendations.    Joyce Peacock PA-C  [facility]    I personally spent a total of 12 minutes which includes face-to-face time and non-face-to-face time spent on preparing to see the patient, reviewing hospital notes and tests, obtaining history from the patient, performing a medically appropriate exam, counseling and educating the patient, ordering medications/tests/procedures/referrals as clinically indicated, and documenting information in the electronic medical record.

## 2022-05-23 NOTE — PROGRESS NOTES
[facility] FAMILY MEDICINE PROGRESS NOTE        Attending:   Dr. Stout    Resident:   Herber Blanotn M.D.    PATIENT:   Sim Osorio; [id number]; 1945    ID:   77 y.o. male with a history of subdural hematoma s/p craniotomy in 2018 admitted for syncope workup, and found to have severe aortic stenosis and rate controlled atrial fibrillation.    SUBJECTIVE:   Overnight the patient experienced pain in his left knee, unchanged from prior pains, which was not controlled with p.o. oxycodone and was only relieved with Dilaudid.  The patient states this morning that he is feeling overwhelmed with being in the hospital and he and his family think he will do much better when he is able to leave.  He is concerned about his knee, as he knows that he has possibly a couple torn ligaments.  He has no other concerns at this time.    OBJECTIVE:  Vitals:    05/22/22 1626 05/22/22 1917 05/23/22 0014 05/23/22 0514   BP: 114/53 104/55 119/65 119/66   Pulse: 83 74 89 92   Resp: 20 20 19 18   Temp: 36.3 °C (97.4 °F) 36.2 °C (97.2 °F) 37 °C (98.6 °F) 36.9 °C (98.4 °F)   TempSrc: Temporal Temporal Temporal Temporal   SpO2: 95% 93% 94% 97%   Weight:       Height:           Intake/Output Summary (Last 24 hours) at 5/23/2022 0637  Last data filed at 5/23/2022 0028  Gross per 24 hour   Intake 480 ml   Output 1100 ml   Net -620 ml       PHYSICAL EXAM:  General: No acute distress, afebrile, resting comfortably  HEENT: NC/AT. EOMI.   Cardiovascular: RRR without murmurs. Normal capillary refill   Respiratory: CTAB  Abdomen: soft, nontender, nondistended, no masses  EXT:  STAPLES, no edema  Left lower extremity: No erythema or edema; knee appears mildly swollen, unchanged from prior exams; tender to palpation at the popliteal fossa and region of the MCL/medial knee  Right lower extremity: Unremarkable  Skin: No erythema/lesions   Neuro: Non-focal    LABS:  Recent Labs     05/21/22  0143 05/22/22  0229 05/23/22  0100   WBC 9.7 7.9 9.5   RBC 4.42*  4.31* 4.41*   HEMOGLOBIN 12.7* 12.5* 12.6*   HEMATOCRIT 39.2* 38.4* 38.1*   MCV 88.7 89.1 86.4   MCH 28.7 29.0 28.6   RDW 45.4 45.7 44.5   PLATELETCT 216 223 239   MPV 11.5 11.2 11.0   NEUTSPOLYS 70.80 66.00 70.00   LYMPHOCYTES 10.30* 13.20* 9.60*   MONOCYTES 16.30* 17.30* 16.80*   EOSINOPHILS 1.10 1.50 1.50   BASOPHILS 0.20 0.40 0.30     Recent Labs     05/21/22 0143 05/22/22 0229 05/23/22  0100   SODIUM 132* 133* 132*   POTASSIUM 3.8 3.8 3.7   CHLORIDE 102 103 102   CO2 18* 17* 19*   BUN 28* 26* 27*   CREATININE 1.56* 1.23 1.28   CALCIUM 8.6 8.5 8.5   ALBUMIN  --  2.8* 3.0*     Estimated GFR/CRCL = Estimated Creatinine Clearance: 75.5 mL/min (by C-G formula based on SCr of 1.28 mg/dL).  Recent Labs     05/21/22 0143 05/22/22 0229 05/23/22  0100   GLUCOSE 117* 95 135*     Recent Labs     05/21/22 0143 05/22/22 0229 05/23/22  0100   ASTSGOT  --  24 35   ALTSGPT  --  20 27   TBILIRUBIN  --  0.5 0.5   ALKPHOSPHAT  --  81 104*   GLOBULIN  --  3.3 3.4   INR 1.28* 1.25* 1.20*             Recent Labs     05/21/22 0143 05/22/22 0229 05/23/22  0100   INR 1.28* 1.25* 1.20*         IMAGING:  DX-CHEST-PORTABLE (1 VIEW)   Final Result      1.  Hypoinflation without other evidence for acute cardiopulmonary disease.   2.  Stable moderate cardiomegaly.      MR-LUMBAR SPINE-W/O   Final Result      1.  Suboptimal examination secondary to patient motion artifact in inability to obtain axial images secondary to lack of patient cooperation.      2.  Minimal multilevel lumbar spondylotic change.      3.  Likely mild to moderate central canal stenosis at the L2-3 and L3-4 levels which is not adequately evaluated secondary to lack of axial imaging.      4.  No evidence of compression of the lower thoracic cord or conus medullaris.      MR-KNEE-W/O LEFT   Final Result         Limited exam due to motion artifact.      1. The MCL and ACL are not seen, likely torn.      2. Moderate knee joint effusion.      3. Severe tricompartmental  osteoarthritis.      EC-ECHOCARDIOGRAM COMPLETE W/ CONT   Final Result      DX-KNEE 2- LEFT   Final Result      1. Mild anterolateral soft tissue swelling. No fracture, subluxation or joint effusion.   2. Tricompartmental left knee osteoarthritis, Kellgren Cam grade 4 involving the medial knee joint compartment.      CT-TSPINE W/O PLUS RECONS   Final Result         1.  No acute traumatic bony injury of the thoracic spine.   2.  Atherosclerosis and atherosclerotic coronary artery disease      CT-LSPINE W/O PLUS RECONS   Final Result         1.  No acute traumatic bony injury of the lumbar spine. Multilevel degenerative changes of lumbar spine diminish diagnostic sensitivity of this exam.   2.  Diverticulosis with hazy fat stranding adjacent to sigmoid colon suggesting changes of sigmoid diverticulitis.   3.  Atherosclerosis      CT-HEAD W/O   Final Result         1.  No acute intracranial abnormality.   2.  Left maxillary sinusitis changes   3.  Atherosclerosis.             MEDS:  Current Facility-Administered Medications   Medication Last Admin   • acetaminophen (TYLENOL) tablet 1,000 mg 1,000 mg at 05/23/22 0633   • lidocaine (LIDODERM) 5 % 1 Patch 1 Patch at 05/22/22 1118   • diclofenac sodium (Voltaren) 1 % gel 2 g     • warfarin (COUMADIN) tablet 5 mg 5 mg at 05/22/22 1644   • warfarin (COUMADIN) tablet 2.5 mg     • oxyCODONE immediate-release (ROXICODONE) tablet 2.5-5 mg 5 mg at 05/23/22 0634   • furosemide (LASIX) tablet 20 mg 20 mg at 05/23/22 0634   • potassium chloride SA (Kdur) tablet 20 mEq 20 mEq at 05/23/22 0633   • lisinopril (PRINIVIL) tablet 2.5 mg 2.5 mg at 05/23/22 0633   • MD Alert...Warfarin per Pharmacy     • amoxicillin-clavulanate (AUGMENTIN) 875-125 MG per tablet 1 Tablet 1 Tablet at 05/23/22 0634   • enoxaparin (Lovenox) injection 150 mg 150 mg at 05/23/22 0633   • nitroglycerin (NITROSTAT) tablet 0.4 mg     • metoprolol SR (TOPROL XL) tablet 12.5 mg 12.5 mg at 05/23/22 0634   •  senna-docusate (PERICOLACE or SENOKOT S) 8.6-50 MG per tablet 2 Tablet 2 Tablet at 05/22/22 0533    And   • polyethylene glycol/lytes (MIRALAX) PACKET 1 Packet      And   • magnesium hydroxide (MILK OF MAGNESIA) suspension 30 mL      And   • bisacodyl (DULCOLAX) suppository 10 mg     • allopurinol (ZYLOPRIM) tablet 300 mg 300 mg at 05/23/22 0634   • prazosin (MINIPRESS) capsule 1 mg 1 mg at 05/22/22 2044   • tamsulosin (FLOMAX) capsule 0.4 mg 0.4 mg at 05/23/22 0634   • allopurinol (ZYLOPRIM) tablet 450 mg 450 mg at 05/22/22 2044       ASSESSMENT/PLAN:  This is a 77 year old male with history of  subdural hematoma s/p craniotomy in 2018, hypertension, BPH, and gout who presented after a syncopal event. He describes a history in which he has been constipated and dehydrated over the last few days, pointing towards reflex or orthostatic syncope; however, he also has atrial fibrillation on EKG. Additionally, he takes prazosin, lisinopril, and hydrochlorothiazide, which could also have led to the presentation of this episode due to their side effects. Given his history of subdural hematoma, probable paroxysmal atrial fibrillation, diverticulitis at advanced age and [place] syncope score, he has been admitted for further evaluation and treatment.     #Syncopal episode  #H/o subdural hematoma s/p craniotomy in 2018  #Severe, probable D1 grade, aortic stenosis  -CT head did not show acute bleed  -Cranial nerve exam at admission was WNL and he continues to have no focal deficits  -echocardiogram shows severe, probable low flow/low gradient aortic stenosis with EF of 30%  -Cardiology consulted, recommended discussing case with neurology for possibility of starting DOAC  -Neurology reviewed case, confirmed that DOAC could be started in this patient  - Per cardiology recommendation, heparin drip was started on 5/18/2022 in the afternoon  -  Heparin drip was stopped on 5/20/2022  - In discussion of risks and benefits of  anticoagulants, patient and family elected to start warfarin based on the recommendation from cardiology that the patient be on an anticoagulant during his outpatient work-up for aortic stenosis  - In discussion with Pharm.D., it was decided to bridge the patient with Lovenox during transition to warfarin  - On 5/22/2022, cardiology recommended starting the patient on a low-dose of furosemide, p.o., and to continue this medication outpatient  - Palliative care met with the patient on 5/2022, patient elected for DNR/DNI  Plan:  -Fall precautions  -  Continue Lovenox bridge therapy with initiation of warfarin; this treatment was started on 5/20/2022  -Outpatient work-up by structural cardiology and outpatient EP, going to occur at [facility] per cardiology; time and recommendations of the cardiology team greatly appreciated  -Continue telemetry monitoring     #Diverticulitis  -CT a/p shows diverticula with hazy fat stranding adjacent to the sigmoid colon  -Patient received a dose of zosyn in the ED  - Patient had been started on a full liquid diet, tolerated it well, and on 5/20/2022 the patient tolerated a regular diet quite well  - Patient began Augmentin on 5/20/2022; he will complete antibiotics on 5/24, for a total of 7 full days of antibiotics  Plan:  -Continue regular diet  -Bowel prophylaxis regimen  -Continue augmentin     #Stage 3b CKD  #Hypotension  #Hypokalemia  -GFR 43 at admission, with BUN/Cr 30/1.61  -Likely prerenal due to dehydration, but there is possibility of obstruction due to BPH  - Creatinine downtrending today  Plan:  -Bladder scan protocol  -Continue BPH medications  -Renal US if worsening  -Monitor outpatient; if has >5 gfr decrease per year or gfr <30, refer to nephrology  -Avoid nephrotoxins, renally dose medications     #Atrial fibrillation  #Elevated troponin  -EKG in ED shows afib with rate 80  -Patient asymptomatic  -Troponin 33, with downtrend and no need for further testing  -ChadsVasc  score: 4  Plan:  -  Continue Lovenox bridge and warfarin  - Warfarin dosing per pharmacy  -Patient to follow-up outpatient with structural cards and EP  -Telemetry monitoring     #Left knee pain  #Chronic low back pain  -Osteoarthritis on x-ray imaging without fracture or subluxation  - The patient has experienced significant pain while inpatient, requiring scheduled 0.5 mg Dilaudid  - On 5/20, scheduled Flexeril was started; that evening and in the early morning hours, the patient felt confused and also experienced some chest pain  - On 5/21, patient had MRI of left knee showing moderate effusion, tricompartmental osteoarthritis and likely tearing of the MCL and ACL as they could not be visualized; additionally, MRI lumbar spine showed moderate stenosis at L2-L4  Plan:  - As needed oxycodone  -Trial of prednisone today; if alleviates knee pain, will continue 5-day course  - Patient's case discussed with physical therapy and also with orthopedic PA; start hinged knee brace; patient can be weightbearing; consultants time and recommendations much appreciated  - Patient will require stay at SNF, with 2 currently lined up and accepting the patient, per case management    #Hyponatremia  - Patient has had mild hyponatremia in the 130s, likely related to fluid overload  - Patient was started on furosemide on 5/22  Plan:  - Continue to monitor with daily BMP  - Continue furosemide p.o.     #BPH  -Continue home prazosin and tamsulosin  -Patient should have outpatient follow up regarding his prazosin, as it could contribute to his near syncopal episodes that he states he has fairly frequently     #Hypertension  -Holding lisinopril and HCTZ home doses  -Patient should have outpatient follow up regarding his ACEI and HCTZ, as they could contribute to his near syncopal episodes that he states he has fairly frequently     Social:  - On 5/18 and 5/19, discussions were held with patient and family present in the room regarding CODE  STATUS and prognosis of severe aortic stenosis  - Palliative care consult occurred on 5/22/2022 and at that time patient elected to be DNR/DNI     Core Measures:   Fluids: none  Lines: PIV  Abx: Augmentin  DVT prophylaxis: Lovenox bridge to warfarin  Code Status: DNR/DNI     Disposition: Inpatient    Herber Blanton MD   PGY-2 Family Medicine Resident   [facility]

## 2022-05-23 NOTE — PROGRESS NOTES
Bedside report received from nurse. Assumed care of patient. Patient resting comfortably in bed. A/Ox4, VSS,  All needs met. POC reviewed and white board updated. Tele box on. Call light in reach. Bed locked in lowest position with 2 upper bed rails up. Pain controlled at this time. Family at bedside. Will continue to monitor.

## 2022-05-23 NOTE — CARE PLAN
The patient is Stable - Low risk of patient condition declining or worsening    Shift Goals  Clinical Goals: Pain control  Patient Goals: Sleep    Problem: Pain - Standard  Goal: Alleviation of pain or a reduction in pain to the patient’s comfort goal  Outcome: Progressing  Note: Pain at a tolerable level for patient. Continue to keep pain under control.      Problem: Knowledge Deficit - Standard  Goal: Patient and family/care givers will demonstrate understanding of plan of care, disease process/condition, diagnostic tests and medications  Outcome: Progressing  Note: Provided patient education on medications and POC for evening.      Problem: Fall Risk  Goal: Patient will remain free from falls  Outcome: Progressing  Note: Bed alarm in use. Call light within reach. Bed in lowest position.     Problem: Skin Integrity  Goal: Skin integrity is maintained or improved  Outcome: Progressing  Note: Rotating patient from side to side.

## 2022-05-24 ENCOUNTER — APPOINTMENT (OUTPATIENT)
Dept: RADIOLOGY | Facility: MEDICAL CENTER | Age: 77
DRG: 306 | End: 2022-05-24
Attending: PHYSICAL MEDICINE & REHABILITATION
Payer: COMMERCIAL

## 2022-05-24 ENCOUNTER — PATIENT OUTREACH (OUTPATIENT)
Dept: SCHEDULING | Facility: IMAGING CENTER | Age: 77
End: 2022-05-24
Payer: COMMERCIAL

## 2022-05-24 LAB
ANION GAP SERPL CALC-SCNC: 12 MMOL/L (ref 7–16)
BUN SERPL-MCNC: 27 MG/DL (ref 8–22)
CALCIUM SERPL-MCNC: 8.6 MG/DL (ref 8.5–10.5)
CHLORIDE SERPL-SCNC: 103 MMOL/L (ref 96–112)
CO2 SERPL-SCNC: 19 MMOL/L (ref 20–33)
CREAT SERPL-MCNC: 1.13 MG/DL (ref 0.5–1.4)
ERYTHROCYTE [DISTWIDTH] IN BLOOD BY AUTOMATED COUNT: 44 FL (ref 35.9–50)
GFR SERPLBLD CREATININE-BSD FMLA CKD-EPI: 66 ML/MIN/1.73 M 2
GLUCOSE SERPL-MCNC: 117 MG/DL (ref 65–99)
HCT VFR BLD AUTO: 37.9 % (ref 42–52)
HGB BLD-MCNC: 12.7 G/DL (ref 14–18)
INR PPP: 1.23 (ref 0.87–1.13)
MCH RBC QN AUTO: 28.9 PG (ref 27–33)
MCHC RBC AUTO-ENTMCNC: 33.5 G/DL (ref 33.7–35.3)
MCV RBC AUTO: 86.3 FL (ref 81.4–97.8)
PLATELET # BLD AUTO: 276 K/UL (ref 164–446)
PMV BLD AUTO: 10.8 FL (ref 9–12.9)
POTASSIUM SERPL-SCNC: 4 MMOL/L (ref 3.6–5.5)
PROTHROMBIN TIME: 15.2 SEC (ref 12–14.6)
RBC # BLD AUTO: 4.39 M/UL (ref 4.7–6.1)
SODIUM SERPL-SCNC: 134 MMOL/L (ref 135–145)
WBC # BLD AUTO: 9.7 K/UL (ref 4.8–10.8)

## 2022-05-24 PROCEDURE — 97530 THERAPEUTIC ACTIVITIES: CPT

## 2022-05-24 PROCEDURE — A9270 NON-COVERED ITEM OR SERVICE: HCPCS | Performed by: STUDENT IN AN ORGANIZED HEALTH CARE EDUCATION/TRAINING PROGRAM

## 2022-05-24 PROCEDURE — 700102 HCHG RX REV CODE 250 W/ 637 OVERRIDE(OP): Performed by: FAMILY MEDICINE

## 2022-05-24 PROCEDURE — 700102 HCHG RX REV CODE 250 W/ 637 OVERRIDE(OP): Performed by: STUDENT IN AN ORGANIZED HEALTH CARE EDUCATION/TRAINING PROGRAM

## 2022-05-24 PROCEDURE — A9270 NON-COVERED ITEM OR SERVICE: HCPCS | Performed by: PHYSICIAN ASSISTANT

## 2022-05-24 PROCEDURE — A9270 NON-COVERED ITEM OR SERVICE: HCPCS | Performed by: NURSE PRACTITIONER

## 2022-05-24 PROCEDURE — 73551 X-RAY EXAM OF FEMUR 1: CPT | Mod: RT

## 2022-05-24 PROCEDURE — 99232 SBSQ HOSP IP/OBS MODERATE 35: CPT | Mod: GC | Performed by: FAMILY MEDICINE

## 2022-05-24 PROCEDURE — 700111 HCHG RX REV CODE 636 W/ 250 OVERRIDE (IP): Performed by: STUDENT IN AN ORGANIZED HEALTH CARE EDUCATION/TRAINING PROGRAM

## 2022-05-24 PROCEDURE — 99223 1ST HOSP IP/OBS HIGH 75: CPT | Performed by: PHYSICAL MEDICINE & REHABILITATION

## 2022-05-24 PROCEDURE — 770020 HCHG ROOM/CARE - TELE (206)

## 2022-05-24 PROCEDURE — 85610 PROTHROMBIN TIME: CPT

## 2022-05-24 PROCEDURE — 80048 BASIC METABOLIC PNL TOTAL CA: CPT

## 2022-05-24 PROCEDURE — 85027 COMPLETE CBC AUTOMATED: CPT

## 2022-05-24 PROCEDURE — 700102 HCHG RX REV CODE 250 W/ 637 OVERRIDE(OP): Performed by: PHYSICIAN ASSISTANT

## 2022-05-24 PROCEDURE — A9270 NON-COVERED ITEM OR SERVICE: HCPCS | Performed by: FAMILY MEDICINE

## 2022-05-24 PROCEDURE — 36415 COLL VENOUS BLD VENIPUNCTURE: CPT

## 2022-05-24 PROCEDURE — 700101 HCHG RX REV CODE 250: Performed by: STUDENT IN AN ORGANIZED HEALTH CARE EDUCATION/TRAINING PROGRAM

## 2022-05-24 PROCEDURE — 700102 HCHG RX REV CODE 250 W/ 637 OVERRIDE(OP): Performed by: NURSE PRACTITIONER

## 2022-05-24 RX ORDER — WARFARIN SODIUM 5 MG/1
5 TABLET ORAL DAILY
Qty: 30 TABLET | Refills: 3 | Status: SHIPPED | OUTPATIENT
Start: 2022-05-24 | End: 2022-05-26

## 2022-05-24 RX ORDER — FUROSEMIDE 20 MG/1
20 TABLET ORAL DAILY
Qty: 30 TABLET | Refills: 0 | Status: SHIPPED | OUTPATIENT
Start: 2022-05-25 | End: 2022-09-12 | Stop reason: SDUPTHER

## 2022-05-24 RX ORDER — PREDNISONE 20 MG/1
TABLET ORAL
Qty: 3 TABLET | Refills: 0 | Status: SHIPPED | OUTPATIENT
Start: 2022-05-25 | End: 2022-05-26 | Stop reason: SDUPTHER

## 2022-05-24 RX ORDER — POTASSIUM CHLORIDE 20 MEQ/1
20 TABLET, EXTENDED RELEASE ORAL DAILY
Qty: 60 TABLET | Refills: 11 | Status: SHIPPED | OUTPATIENT
Start: 2022-05-25 | End: 2022-05-26 | Stop reason: SDUPTHER

## 2022-05-24 RX ORDER — LISINOPRIL 2.5 MG/1
2.5 TABLET ORAL 2 TIMES DAILY
Qty: 60 TABLET | Refills: 0 | Status: SHIPPED | OUTPATIENT
Start: 2022-05-24 | End: 2022-07-12

## 2022-05-24 RX ORDER — OXYCODONE HYDROCHLORIDE 5 MG/1
5 TABLET ORAL EVERY 6 HOURS PRN
Qty: 20 TABLET | Refills: 0 | Status: SHIPPED | OUTPATIENT
Start: 2022-05-24 | End: 2022-06-03

## 2022-05-24 RX ORDER — LISINOPRIL 2.5 MG/1
2.5 TABLET ORAL 2 TIMES DAILY
Qty: 30 TABLET | Refills: 0 | Status: SHIPPED | OUTPATIENT
Start: 2022-05-24 | End: 2022-05-24 | Stop reason: SDUPTHER

## 2022-05-24 RX ORDER — OXYCODONE HYDROCHLORIDE 5 MG/1
10 TABLET ORAL EVERY 4 HOURS PRN
Qty: 20 TABLET | Refills: 0 | Status: SHIPPED | OUTPATIENT
Start: 2022-05-24 | End: 2022-05-24

## 2022-05-24 RX ORDER — WARFARIN SODIUM 5 MG/1
5 TABLET ORAL DAILY
Status: DISCONTINUED | OUTPATIENT
Start: 2022-05-24 | End: 2022-05-26

## 2022-05-24 RX ORDER — WARFARIN SODIUM 2.5 MG/1
2.5 TABLET ORAL DAILY
Qty: 30 TABLET | Refills: 0 | Status: SHIPPED | OUTPATIENT
Start: 2022-05-24 | End: 2022-05-26

## 2022-05-24 RX ORDER — METOPROLOL SUCCINATE 25 MG/1
12.5 TABLET, EXTENDED RELEASE ORAL DAILY
Qty: 30 TABLET | Refills: 0 | Status: SHIPPED | OUTPATIENT
Start: 2022-05-25 | End: 2022-09-12 | Stop reason: SDUPTHER

## 2022-05-24 RX ADMIN — ACETAMINOPHEN 1000 MG: 500 TABLET ORAL at 23:15

## 2022-05-24 RX ADMIN — ENOXAPARIN SODIUM 150 MG: 150 INJECTION SUBCUTANEOUS at 17:15

## 2022-05-24 RX ADMIN — LIDOCAINE 1 PATCH: 50 PATCH TOPICAL at 12:22

## 2022-05-24 RX ADMIN — PRAZOSIN HYDROCHLORIDE 1 MG: 1 CAPSULE ORAL at 20:34

## 2022-05-24 RX ADMIN — ALLOPURINOL 450 MG: 300 TABLET ORAL at 20:34

## 2022-05-24 RX ADMIN — TAMSULOSIN HYDROCHLORIDE 0.4 MG: 0.4 CAPSULE ORAL at 05:35

## 2022-05-24 RX ADMIN — METOPROLOL SUCCINATE 12.5 MG: 25 TABLET, EXTENDED RELEASE ORAL at 05:36

## 2022-05-24 RX ADMIN — WARFARIN SODIUM 5 MG: 5 TABLET ORAL at 17:14

## 2022-05-24 RX ADMIN — ENOXAPARIN SODIUM 150 MG: 150 INJECTION SUBCUTANEOUS at 05:37

## 2022-05-24 RX ADMIN — ACETAMINOPHEN 1000 MG: 500 TABLET ORAL at 00:12

## 2022-05-24 RX ADMIN — FUROSEMIDE 20 MG: 20 TABLET ORAL at 05:35

## 2022-05-24 RX ADMIN — PREDNISONE 20 MG: 20 TABLET ORAL at 05:35

## 2022-05-24 RX ADMIN — LISINOPRIL 2.5 MG: 5 TABLET ORAL at 05:36

## 2022-05-24 RX ADMIN — POTASSIUM CHLORIDE 20 MEQ: 1500 TABLET, EXTENDED RELEASE ORAL at 05:35

## 2022-05-24 RX ADMIN — ACETAMINOPHEN 1000 MG: 500 TABLET ORAL at 05:34

## 2022-05-24 RX ADMIN — LISINOPRIL 2.5 MG: 5 TABLET ORAL at 17:15

## 2022-05-24 RX ADMIN — OXYCODONE 5 MG: 5 TABLET ORAL at 23:15

## 2022-05-24 RX ADMIN — OXYCODONE 5 MG: 5 TABLET ORAL at 11:03

## 2022-05-24 RX ADMIN — AMOXICILLIN AND CLAVULANATE POTASSIUM 1 TABLET: 875; 125 TABLET, FILM COATED ORAL at 05:34

## 2022-05-24 RX ADMIN — ACETAMINOPHEN 1000 MG: 500 TABLET ORAL at 12:22

## 2022-05-24 RX ADMIN — ALLOPURINOL 300 MG: 300 TABLET ORAL at 05:34

## 2022-05-24 RX ADMIN — OXYCODONE 5 MG: 5 TABLET ORAL at 03:41

## 2022-05-24 RX ADMIN — ACETAMINOPHEN 1000 MG: 500 TABLET ORAL at 17:14

## 2022-05-24 RX ADMIN — OXYCODONE 5 MG: 5 TABLET ORAL at 17:14

## 2022-05-24 ASSESSMENT — COGNITIVE AND FUNCTIONAL STATUS - GENERAL
MOVING FROM LYING ON BACK TO SITTING ON SIDE OF FLAT BED: UNABLE
MOVING TO AND FROM BED TO CHAIR: UNABLE
STANDING UP FROM CHAIR USING ARMS: A LOT
CLIMB 3 TO 5 STEPS WITH RAILING: TOTAL
MOBILITY SCORE: 7
WALKING IN HOSPITAL ROOM: TOTAL
TURNING FROM BACK TO SIDE WHILE IN FLAT BAD: UNABLE
SUGGESTED CMS G CODE MODIFIER MOBILITY: CM

## 2022-05-24 ASSESSMENT — FIBROSIS 4 INDEX: FIB4 SCORE: 1.93

## 2022-05-24 ASSESSMENT — GAIT ASSESSMENTS: GAIT LEVEL OF ASSIST: UNABLE TO PARTICIPATE

## 2022-05-24 ASSESSMENT — PAIN DESCRIPTION - PAIN TYPE
TYPE: ACUTE PAIN

## 2022-05-24 NOTE — DISCHARGE PLANNING
[facility]  Outcome: DPA left Vmail regarding medical clearance and bed availability with request of call back.     [phone]  Agency/Facility Name: [facility]   Spoke To: raissa   Outcome: Raissa to verity bed availability, Raissa to call DPA after morning DPA.

## 2022-05-24 NOTE — PROGRESS NOTES
Inpatient Anticoagulation Service Note    Date: 5/24/2022    Reason for Anticoagulation: Atrial Fibrillation   Target INR: 2.0 to 3.0  [id number] VASc Score: 3  HAS-BLED Score: 4   Hemoglobin Value: (!) 12.7  Hematocrit Value: (!) 37.9  Lab Platelet Value: 276    INR from last 7 days     Date/Time INR Value    05/24/22 0142 1.23    05/23/22 0100 1.2    05/22/22 0229 1.25    05/21/22 0143 1.28    05/20/22 1424 1.16    05/18/22 1641 1.35        Dose from last 7 days     Date/Time Dose (mg)    05/24/22 1305 5    05/23/22 1414 2.5    05/22/22 1518 5    05/21/22 1309 2.5    05/20/22 1428 2.5        Average Dose (mg): 3.13 (New start)  Significant Interactions: Corticosteroids, Allopurinol  Bridge Therapy: Yes  INR Value Greater than 2 Prior to Discontinuation of Parenteral Anticoagulation: Not Applicable   Reversal Agent Administered: Not Applicable    Comments: Patient continunes on new-start warfarin for AF, with therapeutic Lovenox bridge given PMH. INR sub-therapeutic. CBC stable and no signs of active bleeding. DDI noted above. Cardiac diet ordered, patient with adequate intake (> 50% of meals, per chart review). Previous warfarin doses received. INR sub-therapeutic, will increase warfairn to 5mg po daily with repeat INR tomorrow. Note - Lovenox bridge continues.    Plan:  Warfarin 5mg po daily with repeat INR tomorrow.  Education Material Provided?: Yes  Pharmacist suggested discharge dosing: tentative plan to continue warfarin 5mg po daily with repeat INR within 2-3 days of discharge     Silva Tejada, PharmD, BCCCP

## 2022-05-24 NOTE — DISCHARGE SUMMARY
[facility] DISCHARGE SUMMARY     PATIENT ID:  Name:             Sim Osorio   YOB: 1945  Age:                 77 y.o.  male   MRN:               [id number]  Address:         [address] [place] [address] Road  [place] [address]  [place] [address]  Phone:            [phone] (home)    ADMISSION DATE:   5/17/2022    DISCHARGE DATE:   5/24/2022    DISCHARGE DIAGNOSES:   Severe Aortic Stenosis  Diverticulitis  Atrial Fibrillation  Left MCL tear  Left ACL tear    ATTENDING PHYSICIAN:   Dr. Stout    RESIDENT:   Herber Blanton M.D.    CONSULTANTS:    Cardiology    PROCEDURES:    None    IMAGING:   DX-CHEST-PORTABLE (1 VIEW)   Final Result      1.  Hypoinflation without other evidence for acute cardiopulmonary disease.   2.  Stable moderate cardiomegaly.      MR-LUMBAR SPINE-W/O   Final Result      1.  Suboptimal examination secondary to patient motion artifact in inability to obtain axial images secondary to lack of patient cooperation.      2.  Minimal multilevel lumbar spondylotic change.      3.  Likely mild to moderate central canal stenosis at the L2-3 and L3-4 levels which is not adequately evaluated secondary to lack of axial imaging.      4.  No evidence of compression of the lower thoracic cord or conus medullaris.      MR-KNEE-W/O LEFT   Final Result         Limited exam due to motion artifact.      1. The MCL and ACL are not seen, likely torn.      2. Moderate knee joint effusion.      3. Severe tricompartmental osteoarthritis.      EC-ECHOCARDIOGRAM COMPLETE W/ CONT   Final Result      DX-KNEE 2- LEFT   Final Result      1. Mild anterolateral soft tissue swelling. No fracture, subluxation or joint effusion.   2. Tricompartmental left knee osteoarthritis, Kellgren Cam grade 4 involving the medial knee joint compartment.      CT-TSPINE W/O PLUS RECONS   Final Result         1.  No acute traumatic bony injury of the thoracic spine.   2.  Atherosclerosis and atherosclerotic coronary artery disease       CT-LSPINE W/O PLUS RECONS   Final Result         1.  No acute traumatic bony injury of the lumbar spine. Multilevel degenerative changes of lumbar spine diminish diagnostic sensitivity of this exam.   2.  Diverticulosis with hazy fat stranding adjacent to sigmoid colon suggesting changes of sigmoid diverticulitis.   3.  Atherosclerosis      CT-HEAD W/O   Final Result         1.  No acute intracranial abnormality.   2.  Left maxillary sinusitis changes   3.  Atherosclerosis.             PHYSICAL EXAM:  General: No acute distress, afebrile, resting comfortably  HEENT: NC/AT. EOMI.   Cardiovascular: RRR without murmurs. Normal capillary refill   Respiratory: CTAB  Abdomen: soft, nontender, nondistended, no masses  EXT:  STAPLES, no edema  Left lower extremity: Mild swelling of left knee, no erythema, no edema; tender to palpation at the popliteal fossa and region of the MCL/medial knee  Right lower extremity: Unremarkable  Skin: No erythema/lesions   Neuro: Non-focal    LABS:  Recent Labs     05/22/22 0229 05/23/22  0100 05/24/22  0142   WBC 7.9 9.5 9.7   RBC 4.31* 4.41* 4.39*   HEMOGLOBIN 12.5* 12.6* 12.7*   HEMATOCRIT 38.4* 38.1* 37.9*   MCV 89.1 86.4 86.3   MCH 29.0 28.6 28.9   RDW 45.7 44.5 44.0   PLATELETCT 223 239 276   MPV 11.2 11.0 10.8   NEUTSPOLYS 66.00 70.00  --    LYMPHOCYTES 13.20* 9.60*  --    MONOCYTES 17.30* 16.80*  --    EOSINOPHILS 1.50 1.50  --    BASOPHILS 0.40 0.30  --      Recent Labs     05/22/22 0229 05/23/22  0100 05/24/22  0142   SODIUM 133* 132* 134*   POTASSIUM 3.8 3.7 4.0   CHLORIDE 103 102 103   CO2 17* 19* 19*   GLUCOSE 95 135* 117*   BUN 26* 27* 27*     Lab Results   Component Value Date/Time    CHOLSTRLTOT 148 09/18/2011 07:19 PM    LDL 91 09/18/2011 07:19 PM    HDL 41 09/18/2011 07:19 PM    TRIGLYCERIDE 78 09/18/2011 07:19 PM       Lab Results   Component Value Date/Time    TROPONINI 0.04 04/21/2012 12:30 PM    CKMB 1.3 03/26/2005 03:25 AM       Lab Results   Component Value Date/Time  "   TROPONINI 0.04 04/21/2012 12:30 PM    CKMB 1.3 03/26/2005 03:25 AM            HOSPITAL COURSE:   H&P per Dr. Blanton:  \"Sim Osorio is a 77 y.o. male with a history of subdural hematoma s/p craniotomy in 2018, hypertension, BPH, and gout who presented after syncopal event.  This history was obtained from patient and his wife.  The patient states that he deals with chronic constipation relieved by Colace, but stopped the medication about 7 days ago due to diarrhea.  After he stopped the medication, his constipation quickly returned and he began to experience bloating and mild diffuse abdominal pressure. Approximately 5 days ago he began to feel weak, continued to be constipated, and developed a lack of appetite.  He also had left lower quadrant intermittent, burning abdominal pain that was nonradiating.  This intermittent and burning pain has continued, unchanged, since it began. He also had subjective fevers.  Early this morning the patient was ambulating to the restroom using his walker, when he says he \"lost consciousness.\"  He did not have any diaphoresis, chest pain, vision or hearing changes associated with the episode.  His wife then found him a few minutes after he fell to the ground.  Per wife, the patient was laying on his left side and said that the back of his head, lower back, and left knee were causing him pain.  EMS was called and the patient was brought to the ED.  Per the patient he does not have any cardiac history besides hypertension, and has never had a consultation with a cardiologist.  He also denies ever being told that he has ever had a cardiac arrhythmia.  Of note the patient states that he regularly feels presyncopal with sweating and darkening of his vision, usually with standing from a seated position.  He also required craniotomy x2 in 2018 while in [facility] after a subdural hematoma was incidentally found during an ED visit for a hypertensive episode.        ERCourse:  Vitals " in the ED included temperature of 98 °F, pulse of 73-79, respiratory rate of 15 and 19, systolic blood pressure of 99 to 120 mmHg, and oxygen saturation of 92 to 95% in ambient air.  Labs included elevated white blood cell count of 13.2, hemoglobin of 13.8, PT and INR of 15.2 and 1.24, respectively.  A chemistry panel showed potassium of 3.3, BUN of 30, and creatinine of 1.61.  His GFR was 43.  Troponin was elevated to 33. An EKG showed atrial fibrillation with rate in 80s and LBBB. Imaging included a CT head that did not show any acute abnormality and a CT of the lumbar spine that did not show any acute fracture, but did show colonic diverticula with hazy fat stranding adjacent to the sigmoid colon.  X-ray imaging of the left knee was ordered, and is pending.  Medications administered in the ED included morphine 4 mg and Zosyn.  [facility] family medicine was paged to evaluate the patient.    PHYSICAL EXAM:  Vitals          Vitals:     22 0400 22 0500 22 0600 22 0711   BP: (!) 99/61 109/62 106/56 (!) 96/81   Pulse: 78 77 73 81   Resp: 16 17 19 18   Temp:           TempSrc:           SpO2: 96% 92% 93% 93%   Weight:           Height:              , Temp (24hrs), Av.7 °C (98 °F), Min:36.7 °C (98 °F), Max:36.7 °C (98 °F)  , Pulse Oximetry: 93 %, O2 (LPM): 0, O2 Delivery Device: None - Room Air     General: Pt resting in NAD, cooperative, appears ill; nasal cannula in place without any supplemental oxygen running at this time  Skin:  Pink, warm and pale pink.  No rashes  HEENT: NC/AT. PERRL. EOMI. MMM. No nasal discharge. Oropharynx nonerythematous without exudate/plaques; tenderness present at occiput without any hematoma, or visible lesion  Neck:  Supple without lymphadenopathy or rigidity. No JVD   Lungs:  Symmetrical.  CTAB with no W/R/R.  Good air movement   Cardiovascular: Heart sounds are extremely distant; however, I did not appreciate any rubs, murmurs, or gallops  Abdomen:  Abdomen is  "soft; bowel sounds are within normal limits; mild tenderness to palpation of the left lower quadrant; negative Goodman sign; negative McBurney point tenderness; no guarding; no rigidity. No masses noted.  Genitourinary: Deferred  Extremities:  Full range of motion.  Right lower extremity is unremarkable; left lower extremity knee joint appears mildly edematous, without any surrounding erythema;. 2+ pulses in all extremities.  No edema present in the bilateral lower extremities  Spine:  Straight without vertebral anomalies.  Mild tenderness palpation of the lumbar spine  CNS:  Muscle tone is normal.  Proprioception is normal.  Cranial nerves II-XII grossly intact.  There are no focal deficits.  Alert and oriented to person, place, time and situation\"      Hospital Course per Dr. Blanton:  Severe Aortic Stenosis  -Patient came in due to a syncopal episode at home and had a CT head performed in the ED; CT head did not show acute bleed  -echocardiogram showed severe, probable low flow/low gradient aortic stenosis with EF of 30%  -Cardiology consulted, recommended discussing case with neurology for possibility of starting DOAC  -Neurology reviewed case, confirmed that DOAC could be started in this patient  - Per cardiology recommendation, heparin drip was started on 5/18/2022 in the afternoon  -  Heparin drip was stopped on 5/20/2022  - Patient was not a candidate for inpatient TAVR work-up, so this will be deferred to [facility] outpatient cardiology  - In discussion of risks and benefits of anticoagulants, patient and family elected to start warfarin based on the recommendation from cardiology that the patient be on an anticoagulant during his outpatient work-up for aortic stenosis and also for his atrial fibrillation  - In discussion with Pharm.D., it was decided to bridge the patient with Lovenox during transition to warfarin  - On 5/22/2022, cardiology recommended starting the patient on a low-dose of furosemide, p.o., " and to continue this medication outpatient  - Palliative care met with the patient on 5/2022, patient elected for DNR/DNI  - Per inpatient cardiology, the patient should follow-up with [facility] cardiology regarding outpatient TAVR work-up, and the [facility] (per cardiology EFRAIN, [facility] does not have an outpt TAVR program)      Diverticulitis  -At admission, patient complained of a syncopal episode at home and also abdominal pain that had been going on for a few days duration prior to admission  - CT a/p performed in the ED showed diverticula with hazy fat stranding adjacent to the sigmoid colon  -Patient received a dose of zosyn in the ED  - Patient had been started on a full liquid diet, tolerated it well, and on 5/20/2022 the patient tolerated a regular diet quite well  - Patient began Augmentin on 5/20/2022;  completed the course of p.o. antibiotics on 5/24/2022      Atrial Fibrillation  -EKG in ED shows afib with rate 80  -In the ED at admission, troponin 33, with downtrend and no need for further testing  -Throughout hospital course patient remained asymptomatic  -ChadsVasc score: 4  - Per cardiology, the patient was started on heparin drip and then transition to warfarin via a Lovenox bridge  - Patient was also started on metoprolol per cardiology      Left MCL tear  Left ACL tear  At time of admission, patient stated that he had had a syncopal episode at home in which he fell to his left side  - Osteoarthritis was present on x-ray imaging without fracture or subluxation  - The patient experienced significant knee pain while inpatient requiring scheduled 0.5 mg Dilaudid  - On 5/20, scheduled Flexeril was started; that evening and in the early morning hours, the patient felt confused and also experienced some chest pain, EKG did not show any acute changes  - On 5/21, patient had MRI of left knee showing moderate effusion, tricompartmental osteoarthritis and likely tearing  of the MCL and ACL as they could not be visualized; additionally, MRI lumbar spine showed moderate stenosis at L2-L4  - Orthopedic PA recommended a hinged boot, which was placed on 5/23/2022  - By day of discharge, patient was tolerating pain with p.o. oxycodone 5 mg for moderate pain and p.o. oxycodone 10 mg for severe pain  - The patient's  was reviewed, did not show increased risk of abuse, and a paper prescription was provided for a 7-day course of 5 mg oxycodone and a 7-day course of 10 mg oxycodone  - Patient discharging to skilled nursing facility for ongoing physical therapy and Occupational Therapy      DISCHARGE CONDITION:    Stable    DISPOSITION:   SNF    DISCHARGE MEDICATIONS:      Medication List      START taking these medications      Instructions   furosemide 20 MG Tabs  Start taking on: May 25, 2022  Commonly known as: LASIX   Take 1 Tablet by mouth every day.  Dose: 20 mg     metoprolol SR 25 MG Tb24  Start taking on: May 25, 2022  Commonly known as: TOPROL XL   Take 0.5 Tablets by mouth every day.  Dose: 12.5 mg     * oxyCODONE immediate-release 5 MG Tabs  Commonly known as: ROXICODONE   Take 1 Tablet by mouth every 6 hours as needed (Moderate pain 4-6 on ten-point scale) for up to 10 days.  Dose: 5 mg     * oxyCODONE immediate-release 5 MG Tabs  Commonly known as: ROXICODONE   Take 2 Tablets by mouth every four hours as needed for Severe Pain for up to 10 days.  Dose: 10 mg     potassium chloride SA 20 MEQ Tbcr  Start taking on: May 25, 2022  Commonly known as: Kdur   Take 1 Tablet by mouth every day.  Dose: 20 mEq     predniSONE 20 MG Tabs  Start taking on: May 25, 2022  Commonly known as: DELTASONE   Take 1 tab PO daily for three days     * warfarin 5 MG Tabs  Commonly known as: COUMADIN   Take 1 Tablet by mouth every day.  Dose: 5 mg     * warfarin 2.5 MG Tabs  Commonly known as: COUMADIN   Take 1 Tablet by mouth every day.  Dose: 2.5 mg         * This list has 4 medication(s) that are the  same as other medications prescribed for you. Read the directions carefully, and ask your doctor or other care provider to review them with you.            CHANGE how you take these medications      Instructions   lisinopril 2.5 MG Tabs  What changed:   · medication strength  · how much to take  · when to take this  Commonly known as: PRINIVIL   Take 1 Tablet by mouth 2 times a day.  Dose: 2.5 mg     tamsulosin 0.4 MG capsule  What changed: See the new instructions.  Commonly known as: FLOMAX   TAKE 1 CAPSULE BY MOUTH 30 MINUTES AFTER BREAKFAST FOR 60 DAYS        CONTINUE taking these medications      Instructions   acetaminophen 500 MG Tabs  Commonly known as: TYLENOL   Take 500 mg by mouth every morning.  Dose: 500 mg     allopurinol 300 MG Tabs  Commonly known as: ZYLOPRIM   Take 300-450 mg by mouth 2 times a day. 300 mg in the AM  450 mg in the PM  Dose: 300-450 mg     docusate sodium 100 MG Caps  Commonly known as: COLACE   Take 100 mg by mouth every day.  Dose: 100 mg     prazosin 1 MG Caps  Commonly known as: MINIPRESS   Take 1 Capsule by mouth every evening. 1 po at night time  Dose: 1 mg        STOP taking these medications    hydroCHLOROthiazide 25 MG Tabs  Commonly known as: HYDRODIURIL             ACTIVITY:   Normal Activity as Tolerated.    DIET:   Healthy    DISCHARGE INSTRUCTIONS AND FOLLOW UP:  Patient is medically stable for discharge and will be discharged to [facility]    Follow Up: PCP in one week; Follow up with [facility] Cardiology within one week    Discharge Instructions:   Patient was instructed to return the ER in the event of worsening symptoms including but not limited to fever, chest pain, shortness of breath, syncopal episode, abdominal pain, bloody stool or any other major concerns. Patient understands that failure to do so may indicate worsening of his medical condition(s) and result in adverse clinical outcomes including fatality. We have counseled the patient on the importance of compliance and  the patient has agreed to proceed with all medical recommendations and follow up plan indicated above. The patient understands that the failure to do so may result in result in adverse clinical outcomes including fatality.       CC:   Gabriela Bush M.D.

## 2022-05-24 NOTE — PROGRESS NOTES
Report received from Rn. Assumed pt care. Pt is resting in bed on RA, no complaints of pain. Pt A&O x 4. Fall precautions in place, call light and belongings within reach, bed in lowest position. No signs of distress.

## 2022-05-24 NOTE — PROGRESS NOTES
[facility] MEDICINE PROGRESS NOTE        Attending:   Dr. Stout    Resident:   Herber Blanton M.D.    PATIENT:   Sim Osorio; [id number]; 1945    ID:   77 y.o. male with a history of subdural hematoma s/p craniotomy in 2018 admitted for syncope workup, and found to have severe aortic stenosis and rate controlled atrial fibrillation.    SUBJECTIVE:   No acute events overnight.  The patient states that he slept well overnight and did not experience as much pain as he normally does. He is in good spirits, states he has no concerns at this time.    OBJECTIVE:  Vitals:    05/24/22 0406 05/24/22 0536 05/24/22 0726 05/24/22 1132   BP: 123/69 119/70 118/72 119/73   Pulse: 84 86 83 86   Resp: 17  18 18   Temp: 36.7 °C (98 °F)  36.1 °C (97 °F) 36.1 °C (97 °F)   TempSrc: Temporal  Temporal Temporal   SpO2: 93%  94% 93%   Weight:       Height:           Intake/Output Summary (Last 24 hours) at 5/24/2022 1306  Last data filed at 5/24/2022 1100  Gross per 24 hour   Intake 600 ml   Output 300 ml   Net 300 ml       PHYSICAL EXAM:  General: No acute distress, afebrile, resting comfortably  HEENT: NC/AT. EOMI.   Cardiovascular: RRR without murmurs. Normal capillary refill   Respiratory: CTAB  Abdomen: soft, nontender, nondistended, no masses  EXT:  STAPLES, no edema  Left lower extremity: Mild swelling of left knee, no erythema, no edema; tender to palpation at the popliteal fossa and region of the MCL/medial knee  Right lower extremity: Unremarkable  Skin: No erythema/lesions   Neuro: Non-focal    LABS:  Recent Labs     05/22/22  0229 05/23/22  0100 05/24/22  0142   WBC 7.9 9.5 9.7   RBC 4.31* 4.41* 4.39*   HEMOGLOBIN 12.5* 12.6* 12.7*   HEMATOCRIT 38.4* 38.1* 37.9*   MCV 89.1 86.4 86.3   MCH 29.0 28.6 28.9   RDW 45.7 44.5 44.0   PLATELETCT 223 239 276   MPV 11.2 11.0 10.8   NEUTSPOLYS 66.00 70.00  --    LYMPHOCYTES 13.20* 9.60*  --    MONOCYTES 17.30* 16.80*  --    EOSINOPHILS 1.50 1.50  --    BASOPHILS 0.40 0.30  --       Recent Labs     05/22/22 0229 05/23/22 0100 05/24/22 0142   SODIUM 133* 132* 134*   POTASSIUM 3.8 3.7 4.0   CHLORIDE 103 102 103   CO2 17* 19* 19*   BUN 26* 27* 27*   CREATININE 1.23 1.28 1.13   CALCIUM 8.5 8.5 8.6   ALBUMIN 2.8* 3.0*  --      Estimated GFR/CRCL = Estimated Creatinine Clearance: 85.5 mL/min (by C-G formula based on SCr of 1.13 mg/dL).  Recent Labs     05/22/22 0229 05/23/22 0100 05/24/22 0142   GLUCOSE 95 135* 117*     Recent Labs     05/22/22 0229 05/23/22 0100 05/24/22 0142   ASTSGOT 24 35  --    ALTSGPT 20 27  --    TBILIRUBIN 0.5 0.5  --    ALKPHOSPHAT 81 104*  --    GLOBULIN 3.3 3.4  --    INR 1.25* 1.20* 1.23*             Recent Labs     05/22/22 0229 05/23/22 0100 05/24/22 0142   INR 1.25* 1.20* 1.23*         IMAGING:  DX-CHEST-PORTABLE (1 VIEW)   Final Result      1.  Hypoinflation without other evidence for acute cardiopulmonary disease.   2.  Stable moderate cardiomegaly.      MR-LUMBAR SPINE-W/O   Final Result      1.  Suboptimal examination secondary to patient motion artifact in inability to obtain axial images secondary to lack of patient cooperation.      2.  Minimal multilevel lumbar spondylotic change.      3.  Likely mild to moderate central canal stenosis at the L2-3 and L3-4 levels which is not adequately evaluated secondary to lack of axial imaging.      4.  No evidence of compression of the lower thoracic cord or conus medullaris.      MR-KNEE-W/O LEFT   Final Result         Limited exam due to motion artifact.      1. The MCL and ACL are not seen, likely torn.      2. Moderate knee joint effusion.      3. Severe tricompartmental osteoarthritis.      EC-ECHOCARDIOGRAM COMPLETE W/ CONT   Final Result      DX-KNEE 2- LEFT   Final Result      1. Mild anterolateral soft tissue swelling. No fracture, subluxation or joint effusion.   2. Tricompartmental left knee osteoarthritis, Kellgren Cam grade 4 involving the medial knee joint compartment.      CT-TSPINE W/O  PLUS RECONS   Final Result         1.  No acute traumatic bony injury of the thoracic spine.   2.  Atherosclerosis and atherosclerotic coronary artery disease      CT-LSPINE W/O PLUS RECONS   Final Result         1.  No acute traumatic bony injury of the lumbar spine. Multilevel degenerative changes of lumbar spine diminish diagnostic sensitivity of this exam.   2.  Diverticulosis with hazy fat stranding adjacent to sigmoid colon suggesting changes of sigmoid diverticulitis.   3.  Atherosclerosis      CT-HEAD W/O   Final Result         1.  No acute intracranial abnormality.   2.  Left maxillary sinusitis changes   3.  Atherosclerosis.             MEDS:  Current Facility-Administered Medications   Medication Last Admin   • predniSONE (DELTASONE) tablet 20 mg 20 mg at 05/24/22 0535   • oxyCODONE immediate-release (ROXICODONE) tablet 5 mg 5 mg at 05/24/22 1103   • acetaminophen (TYLENOL) tablet 1,000 mg 1,000 mg at 05/24/22 1222   • lidocaine (LIDODERM) 5 % 1 Patch 1 Patch at 05/24/22 1222   • diclofenac sodium (Voltaren) 1 % gel 2 g     • warfarin (COUMADIN) tablet 5 mg 5 mg at 05/22/22 1644   • warfarin (COUMADIN) tablet 2.5 mg 2.5 mg at 05/23/22 1718   • furosemide (LASIX) tablet 20 mg 20 mg at 05/24/22 0535   • potassium chloride SA (Kdur) tablet 20 mEq 20 mEq at 05/24/22 0535   • lisinopril (PRINIVIL) tablet 2.5 mg 2.5 mg at 05/24/22 0536   • MD Alert...Warfarin per Pharmacy     • enoxaparin (Lovenox) injection 150 mg 150 mg at 05/24/22 0537   • nitroglycerin (NITROSTAT) tablet 0.4 mg     • metoprolol SR (TOPROL XL) tablet 12.5 mg 12.5 mg at 05/24/22 0536   • senna-docusate (PERICOLACE or SENOKOT S) 8.6-50 MG per tablet 2 Tablet 2 Tablet at 05/22/22 0533    And   • polyethylene glycol/lytes (MIRALAX) PACKET 1 Packet      And   • magnesium hydroxide (MILK OF MAGNESIA) suspension 30 mL      And   • bisacodyl (DULCOLAX) suppository 10 mg     • allopurinol (ZYLOPRIM) tablet 300 mg 300 mg at 05/24/22 0534   • prazosin  (MINIPRESS) capsule 1 mg 1 mg at 05/23/22 2046   • tamsulosin (FLOMAX) capsule 0.4 mg 0.4 mg at 05/24/22 0535   • allopurinol (ZYLOPRIM) tablet 450 mg 450 mg at 05/23/22 2044       ASSESSMENT/PLAN:  This is a 77 year old male with history of  subdural hematoma s/p craniotomy in 2018, hypertension, BPH, and gout who presented after a syncopal event. He describes a history in which he has been constipated and dehydrated over the last few days, pointing towards reflex or orthostatic syncope; however, he also has atrial fibrillation on EKG. Additionally, he takes prazosin, lisinopril, and hydrochlorothiazide, which could also have led to the presentation of this episode due to their side effects. Given his history of subdural hematoma, probable paroxysmal atrial fibrillation, diverticulitis at advanced age and [place] syncope score, he has been admitted for further evaluation and treatment.     #Syncopal episode  #H/o subdural hematoma s/p craniotomy in 2018  #Severe, probable D1 grade, aortic stenosis  -CT head did not show acute bleed  -Cranial nerve exam at admission was WNL and he continues to have no focal deficits  -echocardiogram shows severe, probable low flow/low gradient aortic stenosis with EF of 30%  -Cardiology consulted, recommended discussing case with neurology for possibility of starting DOAC  -Neurology reviewed case, confirmed that DOAC could be started in this patient  - Per cardiology recommendation, heparin drip was started on 5/18/2022 in the afternoon  -  Heparin drip was stopped on 5/20/2022  - In discussion of risks and benefits of anticoagulants, patient and family elected to start warfarin based on the recommendation from cardiology that the patient be on an anticoagulant during his outpatient work-up for aortic stenosis  - In discussion with Pharm.D., it was decided to bridge the patient with Lovenox during transition to warfarin  - On 5/22/2022, cardiology recommended starting the  patient on a low-dose of furosemide, p.o., and to continue this medication outpatient  - Palliative care met with the patient on 5/2022, patient elected for DNR/DNI  Plan:  -Fall precautions  -  Continue Lovenox bridge therapy with initiation of warfarin; this treatment was started on 5/20/2022  -Outpatient work-up by structural cardiology and outpatient EP, going to occur at [facility] per cardiology; time and recommendations of the cardiology team greatly appreciated  -Continue telemetry monitoring  -Patient now cleared medically for discharge and is awaiting a bed at [facility]     #Diverticulitis  -CT a/p shows diverticula with hazy fat stranding adjacent to the sigmoid colon  -Patient received a dose of zosyn in the ED  - Patient had been started on a full liquid diet, tolerated it well, and on 5/20/2022 the patient tolerated a regular diet quite well  - Patient began Augmentin on 5/20/2022; he will complete antibiotics on 5/24, for a total of 7 full days of antibiotics  Plan:  -Continue regular diet  -Bowel prophylaxis regimen  -Patient now cleared medically for discharge and is awaiting a bed at [facility]     #Stage 3b CKD  #Hypotension  #Hypokalemia  -GFR 43 at admission, with BUN/Cr 30/1.61  -Likely prerenal due to dehydration, but there is possibility of obstruction due to BPH  - Creatinine downtrending today  Plan:  -Bladder scan protocol  -Continue BPH medications  -Renal US if worsening  -Monitor outpatient; if has >5 gfr decrease per year or gfr <30, refer to nephrology  -Avoid nephrotoxins, renally dose medications     #Atrial fibrillation  #Elevated troponin  -EKG in ED shows afib with rate 80  -Patient asymptomatic  -Troponin 33, with downtrend and no need for further testing  -ChadsVasc score: 4  Plan:  -  Continue Lovenox bridge and warfarin  - Warfarin dosing per pharmacy  -Patient to follow-up outpatient with structural cards and EP  -Telemetry monitoring     #Left knee pain  #Chronic low  back pain  -Osteoarthritis on x-ray imaging without fracture or subluxation  - The patient has experienced significant pain while inpatient, requiring scheduled 0.5 mg Dilaudid  - On 5/20, scheduled Flexeril was started; that evening and in the early morning hours, the patient felt confused and also experienced some chest pain  - On 5/21, patient had MRI of left knee showing moderate effusion, tricompartmental osteoarthritis and likely tearing of the MCL and ACL as they could not be visualized; additionally, MRI lumbar spine showed moderate stenosis at L2-L4  Plan:  - As needed oxycodone  -Continue prednisone  - Patient's case discussed with physical therapy and also with orthopedic PA; start hinged knee brace; patient can be weightbearing; consultants time and recommendations much appreciated  -Patient now cleared medically for discharge and is awaiting a bed at [facility] facility     #Hyponatremia  - Patient has had mild hyponatremia in the 130s, likely related to fluid overload  - Patient was started on furosemide on 5/22  Plan:  - Continue to monitor with daily BMP  - Continue furosemide p.o.     #BPH  -Continue home prazosin and tamsulosin  -Patient should have outpatient follow up regarding his prazosin, as it could contribute to his near syncopal episodes that he states he has fairly frequently     #Hypertension  -Holding lisinopril and HCTZ home doses  -Patient should have outpatient follow up regarding his ACEI and HCTZ, as they could contribute to his near syncopal episodes that he states he has fairly frequently     Social:  - On 5/18 and 5/19, discussions were held with patient and family present in the room regarding CODE STATUS and prognosis of severe aortic stenosis  - Palliative care consult occurred on 5/22/2022 and at that time patient elected to be DNR/DNI     Core Measures:   Fluids: none  Lines: PIV  Abx: Augmentin  DVT prophylaxis: Lovenox bridge to warfarin  Code  Status: DNR/DNI     Disposition: Medically cleared for discharge, pending placement      Herber Blanton MD   PGY-2 Family Medicine Resident   [facility]

## 2022-05-24 NOTE — CARE PLAN
The patient is Stable - Low risk of patient condition declining or worsening    Shift Goals  Clinical Goals: maintain skin integrity, pain control  Patient Goals: pain control  Family Goals: CRIS    Progress made toward(s) clinical / shift goals:  pt being turned q2 hours, mepilex applied, low airloss mattress on, and pain medications administered as needed.    Patient is not progressing towards the following goals:      Problem: Pain - Standard  Goal: Alleviation of pain or a reduction in pain to the patient’s comfort goal  Outcome: Not Progressing     Pt still requiring pain medications right on schedule.    Problem: Skin Integrity  Goal: Skin integrity is maintained or improved  Outcome: Not Progressing     Low airloss mattress was not turned on and it is now on. Mepilex aplied to nonblanching red wound on right butt cheek. Q2 turns initiated.    Problem: Knowledge Deficit - Standard  Goal: Patient and family/care givers will demonstrate understanding of plan of care, disease process/condition, diagnostic tests and medications  Outcome: Progressing     Problem: Fall Risk  Goal: Patient will remain free from falls  Outcome: Progressing

## 2022-05-24 NOTE — THERAPY
Physical Therapy   Daily Treatment     Patient Name: Sim Osorio  Age:  77 y.o., Sex:  male  Medical Record #: [id number]  Today's Date: 5/24/2022    Precautions: Fall Risk  Comments: LLE HKB unlocked    Assessment  Pt remains highly motivated and continues to demo progress with PT. Pt performed STS x3 to 4WW with mod A, demos improved lift-off and ability to tolerate WB. Pt was also able to initiate lateral weight shifting and marching. Did require incr time seated rest breaks between standing trials, though did also demo incr time able to stand each time. Continue to recommend placement. PT will follow. Encouraged pt to sit EOB daily with nursing to promote incr activity tolerance. Needs assist for supine > sit, then able to maintain sitting balance on his own.     Plan  Continue current treatment plan.  DC Equipment Recommendations: Unable to determine at this time  Discharge Recommendations: Recommend post-acute placement for additional physical therapy services prior to discharge home         05/24/22 1414   Cognition    Level of Consciousness Alert   Comments motivated, family supportive   Balance   Sitting Balance (Static) Fair +   Sitting Balance (Dynamic) Fair   Standing Balance (Static) Poor   Standing Balance (Dynamic) Poor -   Weight Shift Sitting Fair   Weight Shift Standing Poor   Skilled Intervention Postural Facilitation   Comments standing with 4WW, TCs for improved posture   Gait Analysis   Gait Level Of Assist Unable to Participate   Weight Bearing Status unlocked HKB LLE   Bed Mobility    Supine to Sit Maximal Assist (but improved initiation and LEs to EOB)   Sit to Supine remained sitting EOB with family, RN aware   Scooting Minimal Assist   Skilled Intervention Verbal Cuing;Tactile Cuing;Sequencing   Comments still max A but improved from yesterday   Functional Mobility   Sit to Stand Moderate Assist (height of bed elevated)   Bed, Chair, WC Transfer Unable to Participate   Skilled Intervention  Tactile Cuing;Verbal Cuing;Postural Facilitation;Compensatory Strategies   Comments cues for hand and foot placement with good carryover during session, demos improved fwd weight shift and required less assist today for lift-off and tolerated standing full upright. performed STS x3 with incr time seated rest breaks. on third stand pt was able to initiate lateral weight shifting and marching.   Short Term Goals    Short Term Goal # 1 pt will perform supine <> sit with SPV in 6 visits to get in/out of bed at home   Goal Outcome # 1 goal not met   Short Term Goal # 2 pt will sit EOB 5 min without UE support with fair balance in 6 visits to participate in functional tasks   Goal Outcome # 2 Goal met   Short Term Goal # 3 pt will perform STS with SPV in 6 visits for improved indep   Goal Outcome # 3 Goal not met   Short Term Goal # 4 pt will perform SPT with SPV in 6 visits for improved indep   Goal Outcome # 4 Goal not met   Short Term Goal # 5 pt will ambulate 50ft with FWW and SPV in 6 visits to access home   Goal Outcome # 5 Goal not met

## 2022-05-24 NOTE — CONSULTS
Physical Medicine and Rehabilitation Consultation          Date of initial consultation: 5/24/2022  Consulting provider: Herber Blanton MD  Reason for consultation: assess for acute inpatient rehab appropriateness  LOS: 7 Day(s)    Chief complaint: left knee pain     HPI: The patient is a 77 y.o. right hand dominant male with a past medical history of SDH status postcraniotomy in 2018, hypertension, BPH, gout;  who presented on 5/17/2022  3:07 AM after syncopal episode.  Patient had been experiencing constipation and was on his way to the bathroom when he passed out.  Patient was found by his wife a few minutes later and brought to the ED where he was found to be in atrial fibrillation with systolic heart failure and severe AS.  Patient's echocardiogram found ejection fraction of 30%.  Patient was medically optimized with metoprolol, lisinopril and recommended for rehab.     The patient currently reports bilateral leg pain. The right knee is in an extender and appears slightly swollen. The right knee has pain with muscle activation and there appears to be a seroma on the distal right lateral side. He is joined in the room by multiple family members.     ROS  Pertinent positives are mentioned in the HPI, all others reviewed and are negative.    Social Hx:  1 SH  0 THAO  With: Spouse    THERAPY:  Restrictions: Fall risk   PT: Functional mobility   5/23: Unable to participate in gait, max assist for sit to stand    OT: ADLs  5/23: Max assist lower body dressing toileting    SLP:   None     IMAGING:  MR right knee 5/21/22  Limited exam due to motion artifact.  1. The MCL and ACL are not seen, likely torn.  2. Moderate knee joint effusion.  3. Severe tricompartmental osteoarthritis.    MR lumbar spine 5/21/22  1.  Suboptimal examination secondary to patient motion artifact in inability to obtain axial images secondary to lack of patient cooperation.  2.  Minimal multilevel lumbar spondylotic change.  3.  Likely mild  to moderate central canal stenosis at the L2-3 and L3-4 levels which is not adequately evaluated secondary to lack of axial imaging.  4.  No evidence of compression of the lower thoracic cord or conus medullaris.    PROCEDURES:  None    PMH:  Past Medical History:   Diagnosis Date   • BPH (benign prostatic hypertrophy)    • Heart murmur    • Hypertension    • Renal disorder     kidney stones, enlarged prostrate       PSH:  Past Surgical History:   Procedure Laterality Date   • HERNIA REPAIR     • OTHER ORTHOPEDIC SURGERY      rt rotator cuff,  leftknee        FHX:  Non-pertinent to today's issues    Medications:  Current Facility-Administered Medications   Medication Dose   • predniSONE (DELTASONE) tablet 20 mg  20 mg   • oxyCODONE immediate-release (ROXICODONE) tablet 5 mg  5 mg   • acetaminophen (TYLENOL) tablet 1,000 mg  1,000 mg   • lidocaine (LIDODERM) 5 % 1 Patch  1 Patch   • diclofenac sodium (Voltaren) 1 % gel 2 g  2 g   • warfarin (COUMADIN) tablet 5 mg  5 mg   • warfarin (COUMADIN) tablet 2.5 mg  2.5 mg   • furosemide (LASIX) tablet 20 mg  20 mg   • potassium chloride SA (Kdur) tablet 20 mEq  20 mEq   • lisinopril (PRINIVIL) tablet 2.5 mg  2.5 mg   • MD Alert...Warfarin per Pharmacy     • enoxaparin (Lovenox) injection 150 mg  1 mg/kg   • nitroglycerin (NITROSTAT) tablet 0.4 mg  0.4 mg   • metoprolol SR (TOPROL XL) tablet 12.5 mg  12.5 mg   • senna-docusate (PERICOLACE or SENOKOT S) 8.6-50 MG per tablet 2 Tablet  2 Tablet    And   • polyethylene glycol/lytes (MIRALAX) PACKET 1 Packet  1 Packet    And   • magnesium hydroxide (MILK OF MAGNESIA) suspension 30 mL  30 mL    And   • bisacodyl (DULCOLAX) suppository 10 mg  10 mg   • allopurinol (ZYLOPRIM) tablet 300 mg  300 mg   • prazosin (MINIPRESS) capsule 1 mg  1 mg   • tamsulosin (FLOMAX) capsule 0.4 mg  0.4 mg   • allopurinol (ZYLOPRIM) tablet 450 mg  450 mg       Allergies:  No Known Allergies      Physical Exam:  Vitals: /72   Pulse 83   Temp 36.1  "°C (97 °F) (Temporal)   Resp 18   Ht 1.854 m (6' 1\")   Wt (!) 164 kg (362 lb 7 oz)   SpO2 94%   Gen: NAD  Head: NC/AT  Eyes/ Nose/ Mouth: moist mucous membranes  Cardio: RRR, good distal perfusion, warm extremities  Pulm: normal respiratory effort, no cyanosis   Abd: Soft NTND, negative borborygmi   Ext: No peripheral edema. No calf tenderness. No clubbing. Tense area of swelling around the right lateral knee/distal femur area.     Mental status: answers questions appropriately follows commands  Speech: fluent, no aphasia or dysarthria    Motor:      Upper Extremity  Myotome R L   Shoulder flexion C5 5 5   Elbow flexion C5 5 5   Wrist extension C6 5 5   Elbow extension C7 5 5   Finger flexion C8 5 5   Finger abduction T1 5 5     Lower Extremity Myotome R L   Hip flexion L2 2/5 4/5   Knee extension L3 3/5 3/5   Ankle dorsiflexion L4 4/5 4/5   Toe extension L5 4/5 4/5   Ankle plantarflexion S1 4/5 4/5     Sensory:   intact to light touch through out    Labs: Reviewed and significant for   Recent Labs     05/22/22 0229 05/23/22  0100 05/24/22  0142   RBC 4.31* 4.41* 4.39*   HEMOGLOBIN 12.5* 12.6* 12.7*   HEMATOCRIT 38.4* 38.1* 37.9*   PLATELETCT 223 239 276   PROTHROMBTM 15.4* 14.9* 15.2*   INR 1.25* 1.20* 1.23*     Recent Labs     05/22/22 0229 05/23/22  0100 05/24/22  0142   SODIUM 133* 132* 134*   POTASSIUM 3.8 3.7 4.0   CHLORIDE 103 102 103   CO2 17* 19* 19*   GLUCOSE 95 135* 117*   BUN 26* 27* 27*   CREATININE 1.23 1.28 1.13   CALCIUM 8.5 8.5 8.6     Recent Results (from the past 24 hour(s))   Prothrombin Time    Collection Time: 05/24/22  1:42 AM   Result Value Ref Range    PT 15.2 (H) 12.0 - 14.6 sec    INR 1.23 (H) 0.87 - 1.13   CBC WITHOUT DIFFERENTIAL    Collection Time: 05/24/22  1:42 AM   Result Value Ref Range    WBC 9.7 4.8 - 10.8 K/uL    RBC 4.39 (L) 4.70 - 6.10 M/uL    Hemoglobin 12.7 (L) 14.0 - 18.0 g/dL    Hematocrit 37.9 (L) 42.0 - 52.0 %    MCV 86.3 81.4 - 97.8 fL    MCH 28.9 27.0 - 33.0 pg    " MCHC 33.5 (L) 33.7 - 35.3 g/dL    RDW 44.0 35.9 - 50.0 fL    Platelet Count 276 164 - 446 K/uL    MPV 10.8 9.0 - 12.9 fL   Basic Metabolic Panel    Collection Time: 05/24/22  1:42 AM   Result Value Ref Range    Sodium 134 (L) 135 - 145 mmol/L    Potassium 4.0 3.6 - 5.5 mmol/L    Chloride 103 96 - 112 mmol/L    Co2 19 (L) 20 - 33 mmol/L    Glucose 117 (H) 65 - 99 mg/dL    Bun 27 (H) 8 - 22 mg/dL    Creatinine 1.13 0.50 - 1.40 mg/dL    Calcium 8.6 8.5 - 10.5 mg/dL    Anion Gap 12.0 7.0 - 16.0   ESTIMATED GFR    Collection Time: 05/24/22  1:42 AM   Result Value Ref Range    GFR (CKD-EPI) 66 >60 mL/min/1.73 m 2         ASSESSMENT:  Patient is a 77 y.o. male admitted after GLF, found to have afib and right MCL/ACL tears     Rehabilitation: Impaired ADLs and mobility  Patient is not a candidate for inpatient rehab because he is not expected to have a reasonable amount of improvement in a reasonable amount of time using the combined approach.     All cases are subject to administrative review and recommendations may change    Additional Recommendations:  - Recommend SNF placement for continued care and therapy at a slower pace, where he can stay until he is ready to return home at a supervision level. His wife is much smaller than he is and can only provide supervision. His daughter can provide min assist.   - XR right femur 2/2 to pain after GLF   - Dr. Blanton notified of likely seroma, will reach out to ortho to evalute  - Medical treatment per primary team  - Agree with lidocaine, tylenol, voltaren gel and roxicodone for pain control   - Rate control with metoprolol  -PMR will sign off, please reconsult or reach out via Voalte if further evaluation or medical management is requested        Thank you for allowing us to participate in the care of this patient.     Patient was seen for 83 minutes on unit/floor of which > 50% of time was spent on counseling and coordination of care regarding the above, including  prognosis, risk reduction, benefits of treatment, and options for next stage of care.    Williams Le, DO   Physical Medicine and Rehabilitation     Please note that this dictation was created using voice recognition software. I have made every reasonable attempt to correct obvious errors, but there may be errors of grammar and possibly content that I did not discover before finalizing the note.

## 2022-05-24 NOTE — DISCHARGE PLANNING
Case Management Discharge Planning    Admission Date: 5/17/2022  GMLOS: 3.3  ALOS: 7    6-Clicks ADL Score: 16  6-Clicks Mobility Score: 6  PT and/or OT Eval ordered: Yes  Post-acute Referrals Ordered: Yes  Post-acute Choice Obtained: Yes  Has referral(s) been sent to post-acute provider:  Yes      Anticipated Discharge Dispo: Discharge Disposition: D/T to SNF with Medicare cert in anticipation of skilled care (03)    DME Needed: No    Action(s) Taken: [facility] accepted [facility]  RN CM discussed this with the patient, he prefers to go to [place].  ORIN WARE spoke with SNF liaison Raissa, she will submit for insurance authorization.  Bed availability is expected Thursday or Friday.  RN CM updated the patient, Dr. Blanton, and bedside ORIN Moreira.    Escalations Completed: SNF    Medically Clear: Yes    Next Steps: Case coordination to follow up with [facility] for insurance auth and bed availability.    Barriers to Discharge: Pending Insurance Authorization, bed availability    Is the patient up for discharge tomorrow: No

## 2022-05-24 NOTE — PROGRESS NOTES
Received bedside report from ORIN Reilly, pt care assumed. No signs of acute distress noted at this time. Bed in lowest position. Pt educated on fall risk and use of call light.

## 2022-05-25 LAB
INR PPP: 1.19 (ref 0.87–1.13)
PROTHROMBIN TIME: 14.8 SEC (ref 12–14.6)

## 2022-05-25 PROCEDURE — 700102 HCHG RX REV CODE 250 W/ 637 OVERRIDE(OP): Performed by: PHYSICIAN ASSISTANT

## 2022-05-25 PROCEDURE — 99232 SBSQ HOSP IP/OBS MODERATE 35: CPT | Mod: GC | Performed by: FAMILY MEDICINE

## 2022-05-25 PROCEDURE — 700102 HCHG RX REV CODE 250 W/ 637 OVERRIDE(OP): Performed by: NURSE PRACTITIONER

## 2022-05-25 PROCEDURE — 700111 HCHG RX REV CODE 636 W/ 250 OVERRIDE (IP): Performed by: STUDENT IN AN ORGANIZED HEALTH CARE EDUCATION/TRAINING PROGRAM

## 2022-05-25 PROCEDURE — 700102 HCHG RX REV CODE 250 W/ 637 OVERRIDE(OP): Performed by: FAMILY MEDICINE

## 2022-05-25 PROCEDURE — 700102 HCHG RX REV CODE 250 W/ 637 OVERRIDE(OP): Performed by: STUDENT IN AN ORGANIZED HEALTH CARE EDUCATION/TRAINING PROGRAM

## 2022-05-25 PROCEDURE — 85610 PROTHROMBIN TIME: CPT

## 2022-05-25 PROCEDURE — A9270 NON-COVERED ITEM OR SERVICE: HCPCS | Performed by: STUDENT IN AN ORGANIZED HEALTH CARE EDUCATION/TRAINING PROGRAM

## 2022-05-25 PROCEDURE — 97530 THERAPEUTIC ACTIVITIES: CPT

## 2022-05-25 PROCEDURE — A9270 NON-COVERED ITEM OR SERVICE: HCPCS | Performed by: NURSE PRACTITIONER

## 2022-05-25 PROCEDURE — A9270 NON-COVERED ITEM OR SERVICE: HCPCS | Performed by: FAMILY MEDICINE

## 2022-05-25 PROCEDURE — 36415 COLL VENOUS BLD VENIPUNCTURE: CPT

## 2022-05-25 PROCEDURE — 770020 HCHG ROOM/CARE - TELE (206)

## 2022-05-25 PROCEDURE — A9270 NON-COVERED ITEM OR SERVICE: HCPCS | Performed by: PHYSICIAN ASSISTANT

## 2022-05-25 PROCEDURE — 700101 HCHG RX REV CODE 250: Performed by: STUDENT IN AN ORGANIZED HEALTH CARE EDUCATION/TRAINING PROGRAM

## 2022-05-25 PROCEDURE — 302146: Performed by: FAMILY MEDICINE

## 2022-05-25 RX ADMIN — LIDOCAINE 1 PATCH: 50 PATCH TOPICAL at 11:13

## 2022-05-25 RX ADMIN — ACETAMINOPHEN 1000 MG: 500 TABLET ORAL at 06:24

## 2022-05-25 RX ADMIN — ALLOPURINOL 300 MG: 300 TABLET ORAL at 06:26

## 2022-05-25 RX ADMIN — OXYCODONE 5 MG: 5 TABLET ORAL at 18:19

## 2022-05-25 RX ADMIN — FUROSEMIDE 20 MG: 20 TABLET ORAL at 06:26

## 2022-05-25 RX ADMIN — ENOXAPARIN SODIUM 150 MG: 150 INJECTION SUBCUTANEOUS at 17:23

## 2022-05-25 RX ADMIN — LISINOPRIL 2.5 MG: 5 TABLET ORAL at 17:22

## 2022-05-25 RX ADMIN — OXYCODONE 5 MG: 5 TABLET ORAL at 06:24

## 2022-05-25 RX ADMIN — ALLOPURINOL 450 MG: 300 TABLET ORAL at 20:38

## 2022-05-25 RX ADMIN — ACETAMINOPHEN 1000 MG: 500 TABLET ORAL at 12:00

## 2022-05-25 RX ADMIN — TAMSULOSIN HYDROCHLORIDE 0.4 MG: 0.4 CAPSULE ORAL at 06:25

## 2022-05-25 RX ADMIN — WARFARIN SODIUM 5 MG: 5 TABLET ORAL at 17:22

## 2022-05-25 RX ADMIN — PREDNISONE 20 MG: 20 TABLET ORAL at 06:27

## 2022-05-25 RX ADMIN — SENNOSIDES AND DOCUSATE SODIUM 2 TABLET: 50; 8.6 TABLET ORAL at 17:22

## 2022-05-25 RX ADMIN — POTASSIUM CHLORIDE 20 MEQ: 1500 TABLET, EXTENDED RELEASE ORAL at 06:24

## 2022-05-25 RX ADMIN — METOPROLOL SUCCINATE 12.5 MG: 25 TABLET, EXTENDED RELEASE ORAL at 06:25

## 2022-05-25 RX ADMIN — ENOXAPARIN SODIUM 150 MG: 150 INJECTION SUBCUTANEOUS at 06:24

## 2022-05-25 RX ADMIN — PRAZOSIN HYDROCHLORIDE 1 MG: 1 CAPSULE ORAL at 20:38

## 2022-05-25 RX ADMIN — SENNOSIDES AND DOCUSATE SODIUM 2 TABLET: 50; 8.6 TABLET ORAL at 06:25

## 2022-05-25 RX ADMIN — ACETAMINOPHEN 1000 MG: 500 TABLET ORAL at 17:23

## 2022-05-25 RX ADMIN — LISINOPRIL 2.5 MG: 5 TABLET ORAL at 06:26

## 2022-05-25 RX ADMIN — OXYCODONE 5 MG: 5 TABLET ORAL at 13:01

## 2022-05-25 ASSESSMENT — COGNITIVE AND FUNCTIONAL STATUS - GENERAL
MOBILITY SCORE: 10
SUGGESTED CMS G CODE MODIFIER MOBILITY: CL
CLIMB 3 TO 5 STEPS WITH RAILING: TOTAL
MOVING TO AND FROM BED TO CHAIR: UNABLE
WALKING IN HOSPITAL ROOM: A LITTLE
STANDING UP FROM CHAIR USING ARMS: A LITTLE
TURNING FROM BACK TO SIDE WHILE IN FLAT BAD: UNABLE
MOVING FROM LYING ON BACK TO SITTING ON SIDE OF FLAT BED: UNABLE

## 2022-05-25 ASSESSMENT — PAIN DESCRIPTION - PAIN TYPE
TYPE: ACUTE PAIN

## 2022-05-25 ASSESSMENT — GAIT ASSESSMENTS: GAIT LEVEL OF ASSIST: UNABLE TO PARTICIPATE

## 2022-05-25 ASSESSMENT — FIBROSIS 4 INDEX
FIB4 SCORE: 1.93
FIB4 SCORE: 1.93

## 2022-05-25 NOTE — PROGRESS NOTES
Bedside report received from nurse. Assumed care of pt. Pt resting comfortably in bed. A/Ox4, VSS,  All needs met. POC reviewed and white board updated. Tele box on. Call light in reach. Bed locked in lowest position with 2 upper bed rails up. Pt has pain in his knees from ambulation during the day time and is okay to wait for his PRN at this moment

## 2022-05-25 NOTE — DISCHARGE PLANNING
Agency/Facility Name: [facility]   Spoke To: Sindi   Outcome: Per Sindi auth has been started, as [facility] has no beds currently.     CM notified.

## 2022-05-25 NOTE — CARE PLAN
The patient is Stable - Low risk of patient condition declining or worsening    Shift Goals  Clinical Goals: manage pain, rest and comfort  Patient Goals: rest and comfort  Family Goals: rest and comfort    Progress made toward(s) clinical / shift goals:    Problem: Knowledge Deficit - Standard  Goal: Patient and family/care givers will demonstrate understanding of plan of care, disease process/condition, diagnostic tests and medications  Outcome: Progressing  Note: Pt has an understanding of POC     Problem: Fall Risk  Goal: Patient will remain free from falls  Outcome: Progressing  Note: Pt remains free of falls and calls staff before ambulation      Problem: Skin Integrity  Goal: Skin integrity is maintained or improved  Outcome: Progressing  Note: Pt agreed to Q2 turns most of the shift and would turn himself onto the left side by himself at times       Patient is not progressing towards the following goals:      Problem: Pain - Standard  Goal: Alleviation of pain or a reduction in pain to the patient’s comfort goal  Outcome: Not Progressing  Note: Pt reports bothering pain in both knees, Pt reports it could be from getting up 3x the shift prior. PRN's and repositioning provided

## 2022-05-25 NOTE — PROGRESS NOTES
Pharmacy Warfarin Monitoring     Date: 5/25/2022  Reason for Anticoagulation: Atrial Fibrillation   Target INR: 2.0 to 3.0    [id number] VASc Score: 3  HAS-BLED Score: 4     Hemoglobin Value: (!) 12.7  Hematocrit Value: (!) 37.9  Lab Platelet Value: 276    INR from last 7 days     Date/Time INR Value    05/25/22 0231 1.19    05/24/22 0142 1.23    05/23/22 0100 1.2    05/22/22 0229 1.25    05/21/22 0143 1.28    05/20/22 1424 1.16    05/18/22 1641 1.35        Dose from last 7 days     Date/Time Dose (mg)    05/25/22 1557 5    05/24/22 1305 5    05/23/22 1414 2.5    05/22/22 1518 5    05/21/22 1309 2.5    05/20/22 1428 2.5        Home dose: New start  Significant Interactions: Corticosteroids, Allopurinol    Bridge Therapy: Yes  Bridge Therapy Start Date: 05/20/22   INR Value Greater than 2 Prior to Discontinuation of Parenteral Anticoagulation: Not Applicable, remains on bridge therapy    Reversal Agent Administered: Not Applicable    Comments: Continuation of new start warfarin with lovenox bridge for Afib.  INR remains subtherapeutic.  Continue 5 mg daily at this time with low threshold to increase dosing if INR remains subtherapeutic.    Education Material Provided?: Yes  Pharmacist suggested discharge dosing: TBD pending response to warfarin initiation, however 5 mg daily appears to be safe and reasonable.     Thank you!  Marlena Sandoval, PharmD, BCCCP

## 2022-05-26 LAB
INR PPP: 1.24 (ref 0.87–1.13)
PROTHROMBIN TIME: 15.3 SEC (ref 12–14.6)

## 2022-05-26 PROCEDURE — 700102 HCHG RX REV CODE 250 W/ 637 OVERRIDE(OP): Performed by: STUDENT IN AN ORGANIZED HEALTH CARE EDUCATION/TRAINING PROGRAM

## 2022-05-26 PROCEDURE — A9270 NON-COVERED ITEM OR SERVICE: HCPCS | Performed by: FAMILY MEDICINE

## 2022-05-26 PROCEDURE — 700102 HCHG RX REV CODE 250 W/ 637 OVERRIDE(OP): Performed by: PHYSICIAN ASSISTANT

## 2022-05-26 PROCEDURE — A9270 NON-COVERED ITEM OR SERVICE: HCPCS | Performed by: STUDENT IN AN ORGANIZED HEALTH CARE EDUCATION/TRAINING PROGRAM

## 2022-05-26 PROCEDURE — 700101 HCHG RX REV CODE 250: Performed by: STUDENT IN AN ORGANIZED HEALTH CARE EDUCATION/TRAINING PROGRAM

## 2022-05-26 PROCEDURE — A9270 NON-COVERED ITEM OR SERVICE: HCPCS | Performed by: NURSE PRACTITIONER

## 2022-05-26 PROCEDURE — 700111 HCHG RX REV CODE 636 W/ 250 OVERRIDE (IP): Performed by: STUDENT IN AN ORGANIZED HEALTH CARE EDUCATION/TRAINING PROGRAM

## 2022-05-26 PROCEDURE — A9270 NON-COVERED ITEM OR SERVICE: HCPCS | Performed by: PHYSICIAN ASSISTANT

## 2022-05-26 PROCEDURE — 700102 HCHG RX REV CODE 250 W/ 637 OVERRIDE(OP): Performed by: NURSE PRACTITIONER

## 2022-05-26 PROCEDURE — 85610 PROTHROMBIN TIME: CPT

## 2022-05-26 PROCEDURE — 36415 COLL VENOUS BLD VENIPUNCTURE: CPT

## 2022-05-26 PROCEDURE — 99232 SBSQ HOSP IP/OBS MODERATE 35: CPT | Mod: GC | Performed by: FAMILY MEDICINE

## 2022-05-26 PROCEDURE — 302146: Performed by: FAMILY MEDICINE

## 2022-05-26 PROCEDURE — 770020 HCHG ROOM/CARE - TELE (206)

## 2022-05-26 PROCEDURE — 700102 HCHG RX REV CODE 250 W/ 637 OVERRIDE(OP): Performed by: FAMILY MEDICINE

## 2022-05-26 RX ORDER — LOPERAMIDE HYDROCHLORIDE 2 MG/1
2 CAPSULE ORAL 4 TIMES DAILY PRN
Status: DISCONTINUED | OUTPATIENT
Start: 2022-05-26 | End: 2022-05-27 | Stop reason: HOSPADM

## 2022-05-26 RX ORDER — WARFARIN SODIUM 7.5 MG/1
7.5 TABLET ORAL DAILY
Status: DISCONTINUED | OUTPATIENT
Start: 2022-05-26 | End: 2022-05-27 | Stop reason: HOSPADM

## 2022-05-26 RX ORDER — POTASSIUM CHLORIDE 20 MEQ/1
20 TABLET, EXTENDED RELEASE ORAL DAILY
Qty: 60 TABLET | Refills: 0 | Status: SHIPPED | OUTPATIENT
Start: 2022-05-26 | End: 2022-09-12 | Stop reason: SDUPTHER

## 2022-05-26 RX ORDER — WARFARIN SODIUM 7.5 MG/1
7.5 TABLET ORAL DAILY
Qty: 30 TABLET | Refills: 0 | Status: SHIPPED | OUTPATIENT
Start: 2022-05-26 | End: 2022-07-12

## 2022-05-26 RX ORDER — PREDNISONE 20 MG/1
TABLET ORAL
Qty: 1 TABLET | Refills: 0 | Status: SHIPPED | OUTPATIENT
Start: 2022-05-26 | End: 2022-07-12

## 2022-05-26 RX ORDER — WARFARIN SODIUM 5 MG/1
5 TABLET ORAL DAILY
Qty: 30 TABLET | Refills: 0 | Status: SHIPPED | OUTPATIENT
Start: 2022-05-26 | End: 2022-05-26

## 2022-05-26 RX ADMIN — ENOXAPARIN SODIUM 150 MG: 150 INJECTION SUBCUTANEOUS at 05:34

## 2022-05-26 RX ADMIN — ALLOPURINOL 300 MG: 300 TABLET ORAL at 05:34

## 2022-05-26 RX ADMIN — LISINOPRIL 2.5 MG: 5 TABLET ORAL at 05:34

## 2022-05-26 RX ADMIN — ACETAMINOPHEN 1000 MG: 500 TABLET ORAL at 11:42

## 2022-05-26 RX ADMIN — PREDNISONE 20 MG: 20 TABLET ORAL at 05:34

## 2022-05-26 RX ADMIN — METOPROLOL SUCCINATE 12.5 MG: 25 TABLET, EXTENDED RELEASE ORAL at 05:34

## 2022-05-26 RX ADMIN — ACETAMINOPHEN 1000 MG: 500 TABLET ORAL at 00:31

## 2022-05-26 RX ADMIN — ACETAMINOPHEN 1000 MG: 500 TABLET ORAL at 05:34

## 2022-05-26 RX ADMIN — POTASSIUM CHLORIDE 20 MEQ: 1500 TABLET, EXTENDED RELEASE ORAL at 05:34

## 2022-05-26 RX ADMIN — LOPERAMIDE HYDROCHLORIDE 2 MG: 2 CAPSULE ORAL at 20:59

## 2022-05-26 RX ADMIN — PRAZOSIN HYDROCHLORIDE 1 MG: 1 CAPSULE ORAL at 20:21

## 2022-05-26 RX ADMIN — TAMSULOSIN HYDROCHLORIDE 0.4 MG: 0.4 CAPSULE ORAL at 05:33

## 2022-05-26 RX ADMIN — LIDOCAINE 1 PATCH: 50 PATCH TOPICAL at 11:41

## 2022-05-26 RX ADMIN — ENOXAPARIN SODIUM 150 MG: 150 INJECTION SUBCUTANEOUS at 18:01

## 2022-05-26 RX ADMIN — SENNOSIDES AND DOCUSATE SODIUM 2 TABLET: 50; 8.6 TABLET ORAL at 05:34

## 2022-05-26 RX ADMIN — ALLOPURINOL 450 MG: 300 TABLET ORAL at 20:20

## 2022-05-26 RX ADMIN — WARFARIN SODIUM 7.5 MG: 7.5 TABLET ORAL at 15:02

## 2022-05-26 RX ADMIN — FUROSEMIDE 20 MG: 20 TABLET ORAL at 05:34

## 2022-05-26 RX ADMIN — ACETAMINOPHEN 1000 MG: 500 TABLET ORAL at 18:01

## 2022-05-26 RX ADMIN — OXYCODONE 5 MG: 5 TABLET ORAL at 06:48

## 2022-05-26 RX ADMIN — OXYCODONE 5 MG: 5 TABLET ORAL at 00:31

## 2022-05-26 RX ADMIN — LISINOPRIL 2.5 MG: 5 TABLET ORAL at 18:01

## 2022-05-26 RX ADMIN — OXYCODONE 5 MG: 5 TABLET ORAL at 19:43

## 2022-05-26 ASSESSMENT — FIBROSIS 4 INDEX
FIB4 SCORE: 1.93
FIB4 SCORE: 1.93

## 2022-05-26 ASSESSMENT — PAIN DESCRIPTION - PAIN TYPE
TYPE: ACUTE PAIN

## 2022-05-26 NOTE — DISCHARGE SUMMARY
[facility] DISCHARGE SUMMARY     PATIENT ID:  Name:             Sim Osorio   YOB: 1945  Age:                 77 y.o.  male   MRN:               [id number]  Address:         [address] [place] [address] Road  [place] [address]  [place] [address]  Phone:            [phone] (home)    ADMISSION DATE:   5/17/2022    DISCHARGE DATE:   5/27/2022    DISCHARGE DIAGNOSES:   Severe aortic stenosis  Diverticulitis, resolved  Atrial Fibrillation  ACL tear  MCL tear  Right knee seroma    ATTENDING PHYSICIAN:   Dr. Rai    RESIDENT:   Herber Blanton M.D.    CONSULTANTS:    Cardiology    PROCEDURES:    none    IMAGING:   DX-FEMUR-1 VIEW RIGHT   Final Result      No gross radiographic evidence of acute traumatic injury on single frontal view.      DX-CHEST-PORTABLE (1 VIEW)   Final Result      1.  Hypoinflation without other evidence for acute cardiopulmonary disease.   2.  Stable moderate cardiomegaly.      MR-LUMBAR SPINE-W/O   Final Result      1.  Suboptimal examination secondary to patient motion artifact in inability to obtain axial images secondary to lack of patient cooperation.      2.  Minimal multilevel lumbar spondylotic change.      3.  Likely mild to moderate central canal stenosis at the L2-3 and L3-4 levels which is not adequately evaluated secondary to lack of axial imaging.      4.  No evidence of compression of the lower thoracic cord or conus medullaris.      MR-KNEE-W/O LEFT   Final Result         Limited exam due to motion artifact.      1. The MCL and ACL are not seen, likely torn.      2. Moderate knee joint effusion.      3. Severe tricompartmental osteoarthritis.      EC-ECHOCARDIOGRAM COMPLETE W/ CONT   Final Result      DX-KNEE 2- LEFT   Final Result      1. Mild anterolateral soft tissue swelling. No fracture, subluxation or joint effusion.   2. Tricompartmental left knee osteoarthritis, Kellgren Cam grade 4 involving the medial knee joint compartment.      CT-TSPINE W/O PLUS RECONS  "  Final Result         1.  No acute traumatic bony injury of the thoracic spine.   2.  Atherosclerosis and atherosclerotic coronary artery disease      CT-LSPINE W/O PLUS RECONS   Final Result         1.  No acute traumatic bony injury of the lumbar spine. Multilevel degenerative changes of lumbar spine diminish diagnostic sensitivity of this exam.   2.  Diverticulosis with hazy fat stranding adjacent to sigmoid colon suggesting changes of sigmoid diverticulitis.   3.  Atherosclerosis      CT-HEAD W/O   Final Result         1.  No acute intracranial abnormality.   2.  Left maxillary sinusitis changes   3.  Atherosclerosis.             PHYSICAL EXAM:   General: No acute distress, afebrile, resting comfortably  HEENT: NC/AT. EOMI.   Cardiovascular: RRR without murmurs. Normal capillary refill   Respiratory: CTAB, no tachypnea or retractions  Abdomen: soft, nontender, nondistended, no masses  EXT:  STAPLES, no edema  Skin: No erythema/lesions   Neuro: Non-focal    LABS:  Recent Labs     05/24/22  0142   WBC 9.7   RBC 4.39*   HEMOGLOBIN 12.7*   HEMATOCRIT 37.9*   MCV 86.3   MCH 28.9   RDW 44.0   PLATELETCT 276   MPV 10.8     Recent Labs     05/24/22  0142   SODIUM 134*   POTASSIUM 4.0   CHLORIDE 103   CO2 19*   GLUCOSE 117*   BUN 27*     Lab Results   Component Value Date/Time    CHOLSTRLTOT 148 09/18/2011 07:19 PM    LDL 91 09/18/2011 07:19 PM    HDL 41 09/18/2011 07:19 PM    TRIGLYCERIDE 78 09/18/2011 07:19 PM       Lab Results   Component Value Date/Time    TROPONINI 0.04 04/21/2012 12:30 PM    CKMB 1.3 03/26/2005 03:25 AM       Lab Results   Component Value Date/Time    TROPONINI 0.04 04/21/2012 12:30 PM    CKMB 1.3 03/26/2005 03:25 AM            HOSPITAL COURSE:   H&P per Dr. Blanton  \"Sim Osorio is a 77 y.o. male with a history of subdural hematoma s/p craniotomy in 2018, hypertension, BPH, and gout who presented after syncopal event.  This history was obtained from patient and his wife.  The patient states that he " "deals with chronic constipation relieved by Colace, but stopped the medication about 7 days ago due to diarrhea.  After he stopped the medication, his constipation quickly returned and he began to experience bloating and mild diffuse abdominal pressure. Approximately 5 days ago he began to feel weak, continued to be constipated, and developed a lack of appetite.  He also had left lower quadrant intermittent, burning abdominal pain that was nonradiating.  This intermittent and burning pain has continued, unchanged, since it began. He also had subjective fevers.  Early this morning the patient was ambulating to the restroom using his walker, when he says he \"lost consciousness.\"  He did not have any diaphoresis, chest pain, vision or hearing changes associated with the episode.  His wife then found him a few minutes after he fell to the ground.  Per wife, the patient was laying on his left side and said that the back of his head, lower back, and left knee were causing him pain.  EMS was called and the patient was brought to the ED.  Per the patient he does not have any cardiac history besides hypertension, and has never had a consultation with a cardiologist.  He also denies ever being told that he has ever had a cardiac arrhythmia.  Of note the patient states that he regularly feels presyncopal with sweating and darkening of his vision, usually with standing from a seated position.  He also required craniotomy x2 in 2018 while in [facility] after a subdural hematoma was incidentally found during an ED visit for a hypertensive episode.        ERCourse:  Vitals in the ED included temperature of 98 °F, pulse of 73-79, respiratory rate of 15 and 19, systolic blood pressure of 99 to 120 mmHg, and oxygen saturation of 92 to 95% in ambient air.  Labs included elevated white blood cell count of 13.2, hemoglobin of 13.8, PT and INR of 15.2 and 1.24, respectively.  A chemistry panel showed potassium of 3.3, BUN of 30, and " creatinine of 1.61.  His GFR was 43.  Troponin was elevated to 33. An EKG showed atrial fibrillation with rate in 80s and LBBB. Imaging included a CT head that did not show any acute abnormality and a CT of the lumbar spine that did not show any acute fracture, but did show colonic diverticula with hazy fat stranding adjacent to the sigmoid colon.  X-ray imaging of the left knee was ordered, and is pending.  Medications administered in the ED included morphine 4 mg and Zosyn.  [facility] [place] [facility] was paged to evaluate the patient.    PHYSICAL EXAM:  Vitals          Vitals:     22 0400 22 0500 22 0600 22 0711   BP: (!) 99/61 109/62 106/56 (!) 96/81   Pulse: 78 77 73 81   Resp:    Temp:           TempSrc:           SpO2: 96% 92% 93% 93%   Weight:           Height:              , Temp (24hrs), Av.7 °C (98 °F), Min:36.7 °C (98 °F), Max:36.7 °C (98 °F)  , Pulse Oximetry: 93 %, O2 (LPM): 0, O2 Delivery Device: None - Room Air     General: Pt resting in NAD, cooperative, appears ill; nasal cannula in place without any supplemental oxygen running at this time  Skin:  Pink, warm and pale pink.  No rashes  HEENT: NC/AT. PERRL. EOMI. MMM. No nasal discharge. Oropharynx nonerythematous without exudate/plaques; tenderness present at occiput without any hematoma, or visible lesion  Neck:  Supple without lymphadenopathy or rigidity. No JVD   Lungs:  Symmetrical.  CTAB with no W/R/R.  Good air movement   Cardiovascular: Heart sounds are extremely distant; however, I did not appreciate any rubs, murmurs, or gallops  Abdomen:  Abdomen is soft; bowel sounds are within normal limits; mild tenderness to palpation of the left lower quadrant; negative Goodman sign; negative McBurney point tenderness; no guarding; no rigidity. No masses noted.  Genitourinary: Deferred  Extremities:  Full range of motion.  Right lower extremity is unremarkable; left lower extremity knee joint appears mildly edematous,  "without any surrounding erythema;. 2+ pulses in all extremities.  No edema present in the bilateral lower extremities  Spine:  Straight without vertebral anomalies.  Mild tenderness palpation of the lumbar spine  CNS:  Muscle tone is normal.  Proprioception is normal.  Cranial nerves II-XII grossly intact.  There are no focal deficits.  Alert and oriented to person, place, time and situation\"          Hospital Course per Dr. Blanton:    Severe aortic stenosis  -Patient was admitted after syncopal episode; CT head did not show acute bleed  -Echocardiogram showed severe, probable low-flow/low gradient aortic stenosis with EF of 30%  - Cardiology recommended starting a DOAC, so neurology was consulted and reviewed imaging of the patient's subdural hematoma s/p craniotomy in 2018; neurology stated that patient could start a DOAC without concern  Plan:  - Patient now on warfarin anticoagulation; should follow up with anticoagulation clinic within three days of discharge  -Outpatient work-up by structural cardiology and outpatient EP, going to occur at [facility] per cardiology    #Atrial Fibrillation  -EKG in ED showed afib with rate 80, patient not previously diagnosed with A. Fib  -[id number] score of 3  - Patient on warfarin    Diverticulitis, resolved  -CT a/p at admission showed diverticula with hazy fat stranding adjacent to the sigmoid colon  -Patient completed a full 7-day course of antibiotics    ACL tear  MCL tear  -Osteoarthritis on x-ray imaging without fracture or subluxation  -MRI of left knee showed moderate effusion, tricompartmental osteoarthritis and likely tearing of the MCL and ACL as they could not be visualized; additionally, MRI lumbar spine showed moderate stenosis at L2-L4  - 5-day course of prednisone  - Patient is in a hinged knee brace, he is weightbearing, discharging to [facility]    Right knee seroma  - PM&R physician evaluated the patient on 5/24/2022, found the patient had a right knee seroma  - " Orthopedics recommended that the site not be drained, and that the patient can follow-up in the Ortho clinic in approximately 6 weeks      DISCHARGE CONDITION:    Stable    DISPOSITION:   SNF    DISCHARGE MEDICATIONS:      Medication List      START taking these medications      Instructions   furosemide 20 MG Tabs  Commonly known as: LASIX   Take 1 Tablet by mouth every day.  Dose: 20 mg     metoprolol SR 25 MG Tb24  Commonly known as: TOPROL XL   Take 0.5 Tablet by mouth every day.  Dose: 12.5 mg     oxyCODONE immediate-release 5 MG Tabs  Commonly known as: ROXICODONE   Take 1 Tablet by mouth every 6 hours as needed (Moderate pain 4-6 on ten-point scale) for up to 10 days.  Dose: 5 mg     potassium chloride SA 20 MEQ Tbcr  Commonly known as: Kdur   Take 1 Tablet by mouth every day.  Dose: 20 mEq     predniSONE 20 MG Tabs  Commonly known as: DELTASONE   Take 1 tablet by mouth daily for one day     warfarin 7.5 MG Tabs  Commonly known as: COUMADIN   Take 1 Tablet by mouth every day at 6 PM.  Dose: 7.5 mg        CHANGE how you take these medications      Instructions   lisinopril 2.5 MG Tabs  What changed:   · medication strength  · how much to take  · when to take this  Commonly known as: PRINIVIL   Take 1 Tablet by mouth 2 times a day.  Dose: 2.5 mg     tamsulosin 0.4 MG capsule  What changed: See the new instructions.  Commonly known as: FLOMAX   TAKE 1 CAPSULE BY MOUTH 30 MINUTES AFTER BREAKFAST FOR 60 DAYS        CONTINUE taking these medications      Instructions   acetaminophen 500 MG Tabs  Commonly known as: TYLENOL   Take 500 mg by mouth every morning.  Dose: 500 mg     allopurinol 300 MG Tabs  Commonly known as: ZYLOPRIM   Take 300-450 mg by mouth 2 times a day. 300 mg in the AM  450 mg in the PM  Dose: 300-450 mg     docusate sodium 100 MG Caps  Commonly known as: COLACE   Take 100 mg by mouth every day.  Dose: 100 mg     prazosin 1 MG Caps  Commonly known as: MINIPRESS   Take 1 Capsule by mouth every  evening. 1 po at night time  Dose: 1 mg        STOP taking these medications    hydroCHLOROthiazide 25 MG Tabs  Commonly known as: HYDRODIURIL            ACTIVITY:   Normal Activity as Tolerated.    DIET:   Healthy    DISCHARGE INSTRUCTIONS AND FOLLOW UP:  Patient is medically stable for discharge and will be discharged to SNF    Follow Up: PCP in one week    Discharge Instructions:   Patient was instructed to return the ER in the event of worsening symptoms including but not limited to chest pain, shortness of breath, worsening leg pain or any other major concerns. Patient understands that failure to do so may indicate worsening of his medical condition(s) and result in adverse clinical outcomes including fatality. We have counseled the patient on the importance of compliance and the patient has agreed to proceed with all medical recommendations and follow up plan indicated above. The patient understands that the failure to do so may result in result in adverse clinical outcomes including fatality.       CC:   Gabriela Bush M.D.

## 2022-05-26 NOTE — PROGRESS NOTES
[facility] MEDICINE PROGRESS NOTE        Attending:   Dr. Rai    Resident:   Herber Blanton M.D.    PATIENT:   Sim Osorio; [id number]; 1945    ID:   77 y.o. male with a history of subdural hematoma s/p craniotomy in 2018 admitted for syncope workup, and found to have severe aortic stenosis and rate controlled atrial fibrillation.       SUBJECTIVE:   No acute events overnight.  This morning the patient states that he slept without distress last night and had minimal interruptions.  Family at bedside this morning with no concerns.    OBJECTIVE:  Vitals:    05/26/22 0000 05/26/22 0400 05/26/22 0736 05/26/22 1144   BP: 118/76  118/76 134/81   Pulse: 94  80 98   Resp: 18 16 16 18   Temp: 36.1 °C (97 °F) 36.1 °C (97 °F) 36.4 °C (97.6 °F) 36.5 °C (97.7 °F)   TempSrc: Temporal Temporal Temporal Temporal   SpO2: 95%  94% 91%   Weight:   (!) 164 kg (362 lb 7 oz)    Height:           Intake/Output Summary (Last 24 hours) at 5/26/2022 1259  Last data filed at 5/26/2022 0600  Gross per 24 hour   Intake --   Output 725 ml   Net -725 ml       PHYSICAL EXAM:  General: No acute distress, afebrile, resting comfortably  HEENT: NC/AT. EOMI.   Cardiovascular: RRR without murmurs. Normal capillary refill   Respiratory: CTAB  Abdomen: soft, nontender, nondistended, no masses  EXT:  STAPLES, no edema  Skin: No erythema/lesions   Neuro: Non-focal    LABS:  Recent Labs     05/24/22 0142   WBC 9.7   RBC 4.39*   HEMOGLOBIN 12.7*   HEMATOCRIT 37.9*   MCV 86.3   MCH 28.9   RDW 44.0   PLATELETCT 276   MPV 10.8     Recent Labs     05/24/22 0142   SODIUM 134*   POTASSIUM 4.0   CHLORIDE 103   CO2 19*   BUN 27*   CREATININE 1.13   CALCIUM 8.6     Estimated GFR/CRCL = Estimated Creatinine Clearance: 85.5 mL/min (by C-G formula based on SCr of 1.13 mg/dL).  Recent Labs     05/24/22 0142   GLUCOSE 117*     Recent Labs     05/24/22 0142 05/25/22  0231 05/26/22  0229   INR 1.23* 1.19* 1.24*             Recent Labs     05/24/22 0142  05/25/22  0231 05/26/22  0229   INR 1.23* 1.19* 1.24*         IMAGING:  DX-FEMUR-1 VIEW RIGHT   Final Result      No gross radiographic evidence of acute traumatic injury on single frontal view.      DX-CHEST-PORTABLE (1 VIEW)   Final Result      1.  Hypoinflation without other evidence for acute cardiopulmonary disease.   2.  Stable moderate cardiomegaly.      MR-LUMBAR SPINE-W/O   Final Result      1.  Suboptimal examination secondary to patient motion artifact in inability to obtain axial images secondary to lack of patient cooperation.      2.  Minimal multilevel lumbar spondylotic change.      3.  Likely mild to moderate central canal stenosis at the L2-3 and L3-4 levels which is not adequately evaluated secondary to lack of axial imaging.      4.  No evidence of compression of the lower thoracic cord or conus medullaris.      MR-KNEE-W/O LEFT   Final Result         Limited exam due to motion artifact.      1. The MCL and ACL are not seen, likely torn.      2. Moderate knee joint effusion.      3. Severe tricompartmental osteoarthritis.      EC-ECHOCARDIOGRAM COMPLETE W/ CONT   Final Result      DX-KNEE 2- LEFT   Final Result      1. Mild anterolateral soft tissue swelling. No fracture, subluxation or joint effusion.   2. Tricompartmental left knee osteoarthritis, Kellgren Cam grade 4 involving the medial knee joint compartment.      CT-TSPINE W/O PLUS RECONS   Final Result         1.  No acute traumatic bony injury of the thoracic spine.   2.  Atherosclerosis and atherosclerotic coronary artery disease      CT-LSPINE W/O PLUS RECONS   Final Result         1.  No acute traumatic bony injury of the lumbar spine. Multilevel degenerative changes of lumbar spine diminish diagnostic sensitivity of this exam.   2.  Diverticulosis with hazy fat stranding adjacent to sigmoid colon suggesting changes of sigmoid diverticulitis.   3.  Atherosclerosis      CT-HEAD W/O   Final Result         1.  No acute intracranial  abnormality.   2.  Left maxillary sinusitis changes   3.  Atherosclerosis.             MEDS:  Current Facility-Administered Medications   Medication Last Admin   • warfarin (COUMADIN) tablet 5 mg 5 mg at 05/25/22 1722   • predniSONE (DELTASONE) tablet 20 mg 20 mg at 05/26/22 0534   • oxyCODONE immediate-release (ROXICODONE) tablet 5 mg 5 mg at 05/26/22 0648   • acetaminophen (TYLENOL) tablet 1,000 mg 1,000 mg at 05/26/22 1142   • lidocaine (LIDODERM) 5 % 1 Patch 1 Patch at 05/26/22 1141   • diclofenac sodium (Voltaren) 1 % gel 2 g     • furosemide (LASIX) tablet 20 mg 20 mg at 05/26/22 0534   • potassium chloride SA (Kdur) tablet 20 mEq 20 mEq at 05/26/22 0534   • lisinopril (PRINIVIL) tablet 2.5 mg 2.5 mg at 05/26/22 0534   • MD Alert...Warfarin per Pharmacy     • enoxaparin (Lovenox) injection 150 mg 150 mg at 05/26/22 0534   • nitroglycerin (NITROSTAT) tablet 0.4 mg     • metoprolol SR (TOPROL XL) tablet 12.5 mg 12.5 mg at 05/26/22 0534   • senna-docusate (PERICOLACE or SENOKOT S) 8.6-50 MG per tablet 2 Tablet 2 Tablet at 05/26/22 0534    And   • polyethylene glycol/lytes (MIRALAX) PACKET 1 Packet      And   • magnesium hydroxide (MILK OF MAGNESIA) suspension 30 mL      And   • bisacodyl (DULCOLAX) suppository 10 mg     • allopurinol (ZYLOPRIM) tablet 300 mg 300 mg at 05/26/22 0534   • prazosin (MINIPRESS) capsule 1 mg 1 mg at 05/25/22 2038   • tamsulosin (FLOMAX) capsule 0.4 mg 0.4 mg at 05/26/22 0533   • allopurinol (ZYLOPRIM) tablet 450 mg 450 mg at 05/25/22 2038       ASSESSMENT/PLAN:  #Severe, probable D1 grade, aortic stenosis  -Patient was admitted after syncopal episode; CT head did not show acute bleed  -Echocardiogram showed severe, probable low-flow/low gradient aortic stenosis with EF of 30%  - Cardiology recommended starting a DOAC, so neurology was consulted and reviewed imaging of the patient's subdural hematoma s/p craniotomy in 2018; neurology stated that patient could start a DOAC without  concern  Plan:  - Patient now on warfarin anticoagulation, dosing per pharmacy  -Outpatient work-up by structural cardiology and outpatient EP, going to occur at [facility] per cardiology  -Patient now cleared medically for discharge and is awaiting a bed at [facility] facility     #Diverticulitis  -CT a/p at admission showed diverticula with hazy fat stranding adjacent to the sigmoid colon  -Patient completed a full 7-day course of antibiotics       #Stage 3b CKD  #Hypotension  #Hypokalemia  -Continue BPH medications  -Monitor outpatient; if has >5 gfr decrease per year or gfr <30, refer to nephrology     #Atrial fibrillation  #Elevated troponin  -EKG in ED showed afib with rate 80, patient not previously diagnosed with A. fib  - Patient on warfarin, dosed per pharmacy     #Left knee pain  #Chronic low back pain  -Osteoarthritis on x-ray imaging without fracture or subluxation  -MRI of left knee showed moderate effusion, tricompartmental osteoarthritis and likely tearing of the MCL and ACL as they could not be visualized; additionally, MRI lumbar spine showed moderate stenosis at L2-L4  - 5-day course of prednisone  - Patient is in a hinged knee brace, he is weightbearing, pending placement to [facility]     #Right knee pain  - PM&R physician evaluated the patient on 5/24/2022, found the patient had a right knee seroma  - Orthopedics recommended that the site not be drained, and that the patient can follow-up in the Ortho clinic in approximately 6 weeks     #Hyponatremia, resolved  - Patient had mild hyponatremia in the 130s, likely related to fluid overload  - Patient was started on furosemide on 5/22; continue furosemide     #BPH  -Continue home prazosin and tamsulosin     #Hypertension  -Continue lisinopril        Core Measures:   Fluids: none  Lines: PIV  Abx: None  DVT prophylaxis: Warfarin  Code Status: DNR/DNI     Disposition: Medically cleared for discharge, pending placement    Herber Blanton MD   PGY-2 Family Medicine  Resident   [facility]

## 2022-05-26 NOTE — PROGRESS NOTES
Bedside report received from nurse. Assumed care of pt. Pt resting comfortably in bed. A/Ox4, VSS,  All needs met. POC reviewed and white board updated. Tele box on. Call light in reach. Bed locked in lowest position with 2 upper bed rails up. No complaints Pt has pain in his R knee, will administer PRN and ice

## 2022-05-26 NOTE — DISCHARGE PLANNING
Case Management Discharge Planning    Admission Date: 5/17/2022  GMLOS: 3.7  ALOS: 9    6-Clicks ADL Score: 16  6-Clicks Mobility Score: 10  PT and/or OT Eval ordered: Yes  Post-acute Referrals Ordered: Yes  Post-acute Choice Obtained: Yes  Has referral(s) been sent to post-acute provider:  Yes      Anticipated Discharge Dispo: Discharge Disposition: D/T to SNF with Medicare cert in anticipation of skilled care (03)    DME Needed: No    Action(s) Taken: RN CM was informed by [facility] liaison Raissa that the patient has a bed available at [facility] today, and that the patient can be transferred to [facility] once a bed opens.  ORIN WARE and SNF liaison Raissa met with the patient and family at bedside to discuss the discharge plan.  Raissa offered for the patient to discharge to [facility] with a transfer to [place] some time next week.  Patient and family refused to go to [facility] at all, they would rather wait until [facility] has an open bed.  Raissa stated that [facility] will follow up if they have any unexpected bed openings this weekend, otherwise they don't expect bed availability until after Memorial Day.  RN CM delivered the 2nd IMM to the patient, patient signed the IMM at 1500.  RN CM instructed the patient to call [facility] to submit the appeal.  RN CM obtained the patient's signature for the Detailed Notice of Discharge and provided him with a copy as well.  Dr. Blanton updated regarding discharge appeal.    Escalations Completed: Instructed patient to [facility].    Medically Clear: Yes    Next Steps: Case coordination to submit requested documentation to [facility].    Barriers to Discharge: [place] determination.    Is the patient up for discharge tomorrow: No

## 2022-05-26 NOTE — DISCHARGE PLANNING
Agency/Facility Name: [facility]/ [place]  Outcome: DPA left Vmail regarding bed availability with request call back.    1136  Agency/Facility Name: [facility]   Outcome: DPA left Vmail regarding referral with request call back.

## 2022-05-26 NOTE — THERAPY
Physical Therapy   Daily Treatment     Patient Name: Sim Osorio  Age:  77 y.o., Sex:  male  Medical Record #: [id number]  Today's Date: 5/25/2022    Precautions: Fall Risk  Comments: LLE HKB unlocked    Assessment  Pt with significant improvement today. Was able to complete supine > sit without assist from therapist. Performed multiple STS to 4WW with mod A and ultimately completed SPT to chair with min A. Instructed in seated LE exercises for improved strengthening and ROM. Continue to recommend placement. PT will follow.     Plan  Continue current treatment plan.  DC Equipment Recommendations: Unable to determine at this time  Discharge Recommendations: Recommend post-acute placement for additional physical therapy services prior to discharge home         05/25/22 1638   Cognition    Level of Consciousness Alert   Comments highly motivated, family present and supportive   Sitting Lower Body Exercises   Ankle Pumps 1 set of 10   Hip Adduction 1 set of 10   Long Arc Quad 1 set of 10   Marching 1 set of 10   Balance   Sitting Balance (Static) Fair +   Sitting Balance (Dynamic) Fair   Standing Balance (Static) Poor +   Standing Balance (Dynamic) Poor +   Weight Shift Sitting Fair   Weight Shift Standing Fair   Skilled Intervention Verbal Cuing;Tactile Cuing;Sequencing;Postural Facilitation   Comments standing with 4WW   Gait Analysis   Gait Level Of Assist Unable to Participate   Weight Bearing Status unlocked HKB LLE   Comments was able to take steps for SPT to chair today   Bed Mobility    Supine to Sit Supervised  (HOB elevated, use of rail)   Scooting Supervised   Comments did not require assist today   Functional Mobility   Sit to Stand Moderate Assist   Bed, Chair, Wheelchair Transfer Minimal Assist   Transfer Method Stand Pivot   Mobility EOB > chair with 4WW   Skilled Intervention Verbal Cuing;Tactile Cuing;Sequencing   Comments appears anxious, is able to weight shift and take steps over   Short Term Goals     Short Term Goal # 1 pt will perform supine <> sit with SPV without bed features in 6 visits to get in/out of bed at home   Goal Outcome # 1 Progressing as expected   Short Term Goal # 3 pt will perform STS with SPV in 6 visits for improved indep   Goal Outcome # 3 Progressing as expected   Short Term Goal # 4 pt will perform SPT with SPV in 6 visits for improved indep   Goal Outcome # 4 Progressing as expected   Short Term Goal # 5 pt will ambulate 50ft with FWW and SPV in 6 visits to access home   Goal Outcome # 5 Progressing as expected

## 2022-05-26 NOTE — CARE PLAN
The patient is Stable - Low risk of patient condition declining or worsening    Shift Goals  Clinical Goals: pain management, edge of bed for meals, maintain skin integrity  Patient Goals: pain control, comfort  Family Goals: na    Progress made toward(s) clinical / shift goals:       Problem: Pain - Standard  Goal: Alleviation of pain or a reduction in pain to the patient’s comfort goal  Outcome: Progressing     Problem: Knowledge Deficit - Standard  Goal: Patient and family/care givers will demonstrate understanding of plan of care, disease process/condition, diagnostic tests and medications  Outcome: Progressing     Problem: Fall Risk  Goal: Patient will remain free from falls  Outcome: Progressing     Problem: Skin Integrity  Goal: Skin integrity is maintained or improved  Outcome: Progressing       Patient is not progressing towards the following goals:  N/a

## 2022-05-26 NOTE — PROGRESS NOTES
Inpatient Anticoagulation Service Note    Date: 5/26/2022  Reason for Anticoagulation: Atrial Fibrillation   [id number] VASc Score: 3  HAS-BLED Score: 4    Hemoglobin Value: (!) 12.7  Hematocrit Value: (!) 37.9  Lab Platelet Value: 276  Target INR: 2.0 to 3.0    INR from last 7 days     Date/Time INR Value    05/26/22 0229 1.24    05/25/22 0231 1.19    05/24/22 0142 1.23    05/23/22 0100 1.2    05/22/22 0229 1.25    05/21/22 0143 1.28    05/20/22 1424 1.16        Dose from last 7 days     Date/Time Dose (mg)    05/26/22 1358 7.5    05/25/22 1557 5    05/24/22 1305 5    05/23/22 1414 2.5    05/22/22 1518 5    05/21/22 1309 2.5    05/20/22 1428 2.5        Average Dose (mg):  (New start)  Significant Interactions: Corticosteroids (Allopurinol)  Bridge Therapy: Yes  Bridge Therapy Start Date: 05/20/22  Reversal Agent Administered: Not Applicable    Assessment: INR subtherapeutic. Will give a once dose 7.5 mg today prior to discharge. INR ordered daily while inpatient.    Plan:  Warfarin 7.5 mg PO today  Education Material Provided?: Yes  Pharmacist suggested discharge dosing: Warfarin 7.5 mg tablet daily with follow up outpatient INR in next 48-72 hours.      Yany Staton PharmD

## 2022-05-26 NOTE — CARE PLAN
The patient is Stable - Low risk of patient condition declining or worsening    Shift Goals  Clinical Goals: manage pain, rest  Patient Goals: to sleep tonight  Family Goals: na    Progress made toward(s) clinical / shift goals:    Problem: Pain - Standard  Goal: Alleviation of pain or a reduction in pain to the patient’s comfort goal  Outcome: Progressing  Note: Pt reports less pain tonight than the one before     Problem: Knowledge Deficit - Standard  Goal: Patient and family/care givers will demonstrate understanding of plan of care, disease process/condition, diagnostic tests and medications  Outcome: Progressing     Problem: Fall Risk  Goal: Patient will remain free from falls  Outcome: Progressing  Note: Pt remains free of falls and calls staff before ambulation       Patient is not progressing towards the following goals:

## 2022-05-26 NOTE — DISCHARGE PLANNING
DC Transport Scheduled  Canceled per PT appeal    Received request at: 1411  Transport Company Scheduled:  [employer]  Spoke with Katie at [facility] to schedule transport.      Scheduled Date: 05/26/2022  Scheduled Time: 1615    Destination: [facility]     Notified care team of scheduled transport via Voalte.     If there are any changes needed to the DC transportation scheduled, please contact [facility] at ext. [phone] between the hours of [phone] Mon-Fri. If outside those hours, contact the ED Case Manager at ext. [phone].

## 2022-05-26 NOTE — PROGRESS NOTES
Report received from Rn. Assumed pt care. Pt is resting in bed. Pt A&O x 4. Fall precautions in place, call light and belongings within reach, bed in lowest position. No signs of distress.

## 2022-05-27 VITALS
OXYGEN SATURATION: 92 % | SYSTOLIC BLOOD PRESSURE: 148 MMHG | HEIGHT: 73 IN | RESPIRATION RATE: 20 BRPM | TEMPERATURE: 98.1 F | DIASTOLIC BLOOD PRESSURE: 77 MMHG | BODY MASS INDEX: 41.75 KG/M2 | WEIGHT: 315 LBS | HEART RATE: 106 BPM

## 2022-05-27 LAB
INR PPP: 1.42 (ref 0.87–1.13)
PROTHROMBIN TIME: 16.9 SEC (ref 12–14.6)

## 2022-05-27 PROCEDURE — 36415 COLL VENOUS BLD VENIPUNCTURE: CPT

## 2022-05-27 PROCEDURE — 700102 HCHG RX REV CODE 250 W/ 637 OVERRIDE(OP): Performed by: NURSE PRACTITIONER

## 2022-05-27 PROCEDURE — A9270 NON-COVERED ITEM OR SERVICE: HCPCS | Performed by: PHYSICIAN ASSISTANT

## 2022-05-27 PROCEDURE — 700102 HCHG RX REV CODE 250 W/ 637 OVERRIDE(OP): Performed by: STUDENT IN AN ORGANIZED HEALTH CARE EDUCATION/TRAINING PROGRAM

## 2022-05-27 PROCEDURE — 700111 HCHG RX REV CODE 636 W/ 250 OVERRIDE (IP): Performed by: STUDENT IN AN ORGANIZED HEALTH CARE EDUCATION/TRAINING PROGRAM

## 2022-05-27 PROCEDURE — 700102 HCHG RX REV CODE 250 W/ 637 OVERRIDE(OP): Performed by: PHYSICIAN ASSISTANT

## 2022-05-27 PROCEDURE — A9270 NON-COVERED ITEM OR SERVICE: HCPCS | Performed by: STUDENT IN AN ORGANIZED HEALTH CARE EDUCATION/TRAINING PROGRAM

## 2022-05-27 PROCEDURE — A9270 NON-COVERED ITEM OR SERVICE: HCPCS | Performed by: NURSE PRACTITIONER

## 2022-05-27 PROCEDURE — 85610 PROTHROMBIN TIME: CPT

## 2022-05-27 PROCEDURE — 99238 HOSP IP/OBS DSCHRG MGMT 30/<: CPT | Mod: GC | Performed by: FAMILY MEDICINE

## 2022-05-27 PROCEDURE — 302146: Performed by: FAMILY MEDICINE

## 2022-05-27 PROCEDURE — 97530 THERAPEUTIC ACTIVITIES: CPT

## 2022-05-27 RX ORDER — WARFARIN SODIUM 5 MG/1
5 TABLET ORAL DAILY
Status: DISCONTINUED | OUTPATIENT
Start: 2022-05-28 | End: 2022-05-27 | Stop reason: HOSPADM

## 2022-05-27 RX ADMIN — TAMSULOSIN HYDROCHLORIDE 0.4 MG: 0.4 CAPSULE ORAL at 05:26

## 2022-05-27 RX ADMIN — LISINOPRIL 2.5 MG: 5 TABLET ORAL at 05:25

## 2022-05-27 RX ADMIN — FUROSEMIDE 20 MG: 20 TABLET ORAL at 05:25

## 2022-05-27 RX ADMIN — ACETAMINOPHEN 1000 MG: 500 TABLET ORAL at 00:23

## 2022-05-27 RX ADMIN — OXYCODONE 5 MG: 5 TABLET ORAL at 05:26

## 2022-05-27 RX ADMIN — PREDNISONE 20 MG: 20 TABLET ORAL at 05:26

## 2022-05-27 RX ADMIN — ENOXAPARIN SODIUM 150 MG: 150 INJECTION SUBCUTANEOUS at 05:24

## 2022-05-27 RX ADMIN — ALLOPURINOL 300 MG: 300 TABLET ORAL at 05:25

## 2022-05-27 RX ADMIN — POTASSIUM CHLORIDE 20 MEQ: 1500 TABLET, EXTENDED RELEASE ORAL at 05:25

## 2022-05-27 RX ADMIN — ACETAMINOPHEN 1000 MG: 500 TABLET ORAL at 05:25

## 2022-05-27 RX ADMIN — METOPROLOL SUCCINATE 12.5 MG: 25 TABLET, EXTENDED RELEASE ORAL at 05:25

## 2022-05-27 ASSESSMENT — COGNITIVE AND FUNCTIONAL STATUS - GENERAL
CLIMB 3 TO 5 STEPS WITH RAILING: TOTAL
MOBILITY SCORE: 11
TURNING FROM BACK TO SIDE WHILE IN FLAT BAD: UNABLE
MOVING FROM LYING ON BACK TO SITTING ON SIDE OF FLAT BED: A LOT
SUGGESTED CMS G CODE MODIFIER MOBILITY: CL
MOVING TO AND FROM BED TO CHAIR: A LOT
WALKING IN HOSPITAL ROOM: A LOT
STANDING UP FROM CHAIR USING ARMS: A LITTLE

## 2022-05-27 ASSESSMENT — FIBROSIS 4 INDEX: FIB4 SCORE: 1.93

## 2022-05-27 ASSESSMENT — PAIN DESCRIPTION - PAIN TYPE
TYPE: ACUTE PAIN

## 2022-05-27 ASSESSMENT — GAIT ASSESSMENTS: GAIT LEVEL OF ASSIST: UNABLE TO PARTICIPATE

## 2022-05-27 NOTE — THERAPY
Physical Therapy   Daily Treatment     Patient Name: Sim Osorio  Age:  77 y.o., Sex:  male  Medical Record #: [id number]  Today's Date: 5/27/2022     Precautions: Fall Risk  Comments: LLE hinged knee brace - unlocked    Assessment    Pt conts to progress with fxnl mob. Increased fatigue noted today d/t diarrheal symptoms overnight. He reports improving strength in LLE and now able to perform a straight leg raise on L, still with difficulty lifting RLE for bed mob. Once EOB performed STS x 3 reps maintaining standing for ~20-35s today prior to fatiguing and requiring a seated rest. Pt declining attempt to transfer to chair today due difficulty getting out of chair the other day. Initially left EOB at end of session however pt requesting to go BTB after ~10 mins after session in which he required mod A for LE negotiation. PT to cont to follow.    Plan    Continue current treatment plan.    DC Equipment Recommendations: Unable to determine at this time  Discharge Recommendations: Recommend post-acute placement for additional physical therapy services prior to discharge home     Objective     05/27/22 0831   Sitting Lower Body Exercises   Long Arc Quad 1 set of 10   Other Treatments   Other Treatments Provided STS x3 reps, standing tolerance 20s-35s per rep   Balance   Sitting Balance (Static) Fair +   Sitting Balance (Dynamic) Fair +   Standing Balance (Static) Poor +   Standing Balance (Dynamic) Poor +   Gait Analysis   Gait Level Of Assist Unable to Participate   Weight Bearing Status unlocked HKB LLE   Bed Mobility    Supine to Sit Standby Assist (HOB slightly elevated, use of bed rail)   Sit to Supine Moderate Assist (for LE negotiation)   Functional Mobility   Sit to Stand Minimal Assist (height of bed slightly elevated, progressed to CGA)   Bed, Chair, Wheelchair Transfer (Declined)   Short Term Goals    Short Term Goal # 1 pt will perform supine <> sit with SPV without bed features in 6 visits to get in/out of  bed at home   Goal Outcome # 1 Progressing as expected   Short Term Goal # 3 pt will perform STS with SPV in 6 visits for improved indep   Goal Outcome # 3 Progressing as expected   Short Term Goal # 4 pt will perform SPT with SPV in 6 visits for improved indep   Goal Outcome # 4 Progressing as expected   Short Term Goal # 5 pt will ambulate 50ft with FWW and SPV in 6 visits to access home   Goal Outcome # 5 Goal not met

## 2022-05-27 NOTE — DISCHARGE PLANNING
Case Management Discharge Planning    Admission Date: 5/17/2022  GMLOS: 3.7  ALOS: 10    6-Clicks ADL Score: 16  6-Clicks Mobility Score: 10  PT and/or OT Eval ordered: Yes  Post-acute Referrals Ordered: Yes  Post-acute Choice Obtained: Yes  Has referral(s) been sent to post-acute provider:  Yes      Anticipated Discharge Dispo: Discharge Disposition: D/T to SNF with Medicare cert in anticipation of skilled care (03)    DME Needed: No    Action(s) Taken: ORIN WARE was informed by Sindi at [facility] that they have a bed available today.  Sindi requested that transport request be arranged for 1200.  Sindi confirmed that DC summary and pain med prescription from yesterday will suffice.  Transport request faxed to the [facility], DR. Bl[place] updated via voalte.    Escalations Completed: Provider, Ride Line    Medically Clear: Yes    Next Steps: Patient to discharge to [facility].    Barriers to Discharge: Transportation arrangements    Is the patient up for discharge tomorrow: Discharging today.

## 2022-05-27 NOTE — DISCHARGE PLANNING
LSW met with pt and family at bedside to provide update regarding transportation to [facility]. Pt and family agreeable with transfer at 1200. All questions answered.

## 2022-05-27 NOTE — PROGRESS NOTES
Discharge instructions give to patient and family at bedside. Pt verbalizes understanding and states plans for follow up. New and home medications reviewed, post discharge activity level and worsening of symptoms needing follow up care discussed. Telemetry monitor and IV cathlon removed. All belongings accounted for, all questions answered at this time. Pt taken via [facility] to [facility].

## 2022-05-27 NOTE — DISCHARGE PLANNING
DC Transport Scheduled    Received request at: 0936    Transport Company Scheduled:  [employer]  Spoke with Michelle at [facility] to schedule transport.      Scheduled Date: 05/27/2022  Scheduled Time: 1200    Destination: [facility]     Notified care team of scheduled transport via Voalte.     If there are any changes needed to the DC transportation scheduled, please contact [facility] at ext. [phone] between the hours of [phone] Mon-Fri. If outside those hours, contact the ED Case Manager at ext. [phone].

## 2022-05-27 NOTE — CARE PLAN
The patient is Watcher - Medium risk of patient condition declining or worsening    Shift Goals  Clinical Goals: pain managment  Patient Goals: comfort  Family Goals: na    Progress made toward(s) clinical / shift goals:    Problem: Pain - Standard  Goal: Alleviation of pain or a reduction in pain to the patient’s comfort goal  Outcome: Progressing     Problem: Knowledge Deficit - Standard  Goal: Patient and family/care givers will demonstrate understanding of plan of care, disease process/condition, diagnostic tests and medications  Outcome: Progressing     Problem: Fall Risk  Goal: Patient will remain free from falls  Outcome: Progressing     Problem: Skin Integrity  Goal: Skin integrity is maintained or improved  Outcome: Progressing       Patient is not progressing towards the following goals:

## 2022-05-27 NOTE — PROGRESS NOTES
Inpatient Anticoagulation Service Note    Date: 5/27/2022  Reason for Anticoagulation: Atrial Fibrillation   [id number] VASc Score: 3  HAS-BLED Score: 4    Hemoglobin Value: (!) 12.7  Hematocrit Value: (!) 37.9  Lab Platelet Value: 276  Target INR: 2.0 to 3.0    INR from last 7 days     Date/Time INR Value    05/27/22 0129 1.42    05/26/22 0229 1.24    05/25/22 0231 1.19    05/24/22 0142 1.23    05/23/22 0100 1.2    05/22/22 0229 1.25    05/21/22 0143 1.28    05/20/22 1424 1.16        Dose from last 7 days     Date/Time Dose (mg)    05/27/22 0859 7.5    05/26/22 1358 7.5    05/25/22 1557 5    05/24/22 1305 5    05/23/22 1414 2.5    05/22/22 1518 5    05/21/22 1309 2.5    05/20/22 1428 2.5        Average Dose (mg):  (New start)  Significant Interactions: Corticosteroids  Bridge Therapy: Yes  Bridge Therapy Start Date: 05/20/22  Days of Overlap Therapy: 8  INR Value Greater than 2 Prior to Discontinuation of Parenteral Anticoagulation: Not Applicable  Reversal Agent Administered: Not Applicable    Assessment: INR subtherapeutic. INR increasing. Recommend giving one additional day of increased 7.5 mg dose to get patient to therapeutic dose. Prednisone therapy completed today. INR ordered daily. H/H stable, no signed of bleeding per chart review.    Plan:  Warfarin 7.5 mg PO daily  Education Material Provided?: Yes  Pharmacist suggested discharge dosing: Warfarin 7.5 mg PO today followed by Warfarin 5 mg PO daily and follow up with outpatient INR within 72 hours of discharge.      Gabriel HolleyD

## 2022-05-27 NOTE — PROGRESS NOTES
Report received at bedside, pt care assumed, tele box on. Pt aaox4, no signs of distress noted at this time. Patient resting comfortably in bed. POC discussed with pt and verbalizes no questions. Pt denies any additional needs at this time. Bed in lowest position, pt educated on fall risk and verbalized understanding, call light within reach, bed alarm plugged in and on.

## 2022-05-28 NOTE — DISCHARGE PLANNING
ED CM received call from [facility] stating they agree with termination of services and pt's liability will begin 5/29/22 @ noon.    Pt was discharged on 5/27/22

## 2022-06-28 ENCOUNTER — TELEPHONE (OUTPATIENT)
Dept: MEDICAL GROUP | Facility: CLINIC | Age: 77
End: 2022-06-28
Payer: COMMERCIAL

## 2022-06-28 DIAGNOSIS — I62.9 INTRACRANIAL HEMORRHAGE (HCC): ICD-10-CM

## 2022-06-28 NOTE — TELEPHONE ENCOUNTER
VOICEMAIL  1. Caller Name: Tati ( Daughter )                     Call Back Number: [phone]    2. Message:   Pt was adm to the hospital he  Fall , he get discharge but know he needs to do INR check by next week ( Order needed ) , also the Nurse from [facility] is going to visit him tomorrow . Pt is going to need a referral for Orthopedics.

## 2022-06-29 NOTE — PROGRESS NOTES
INR order placed per family member call. Patient needs to be seen in clinic for hospital follow up visit

## 2022-07-12 ENCOUNTER — OFFICE VISIT (OUTPATIENT)
Dept: MEDICAL GROUP | Facility: CLINIC | Age: 77
End: 2022-07-12
Payer: COMMERCIAL

## 2022-07-12 VITALS
HEART RATE: 54 BPM | WEIGHT: 300 LBS | TEMPERATURE: 97.6 F | OXYGEN SATURATION: 96 % | BODY MASS INDEX: 39.76 KG/M2 | HEIGHT: 73 IN

## 2022-07-12 DIAGNOSIS — I35.0 NONRHEUMATIC AORTIC VALVE STENOSIS: ICD-10-CM

## 2022-07-12 DIAGNOSIS — N18.9 CHRONIC KIDNEY DISEASE, UNSPECIFIED CKD STAGE: ICD-10-CM

## 2022-07-12 DIAGNOSIS — F41.9 ANXIETY: ICD-10-CM

## 2022-07-12 DIAGNOSIS — R55 SYNCOPE AND COLLAPSE: ICD-10-CM

## 2022-07-12 DIAGNOSIS — I50.20 SYSTOLIC HEART FAILURE, UNSPECIFIED HF CHRONICITY (HCC): ICD-10-CM

## 2022-07-12 DIAGNOSIS — S83.412S: ICD-10-CM

## 2022-07-12 PROBLEM — S83.412A COMPLETE TEAR OF MEDIAL COLLATERAL LIGAMENT OF LEFT KNEE: Status: ACTIVE | Noted: 2022-07-12

## 2022-07-12 PROCEDURE — 99214 OFFICE O/P EST MOD 30 MIN: CPT | Mod: GC | Performed by: STUDENT IN AN ORGANIZED HEALTH CARE EDUCATION/TRAINING PROGRAM

## 2022-07-12 RX ORDER — VANCOMYCIN HYDROCHLORIDE 125 MG/1
CAPSULE ORAL
COMMUNITY
Start: 2022-06-27 | End: 2022-08-09

## 2022-07-12 RX ORDER — SERTRALINE HYDROCHLORIDE 25 MG/1
25 TABLET, FILM COATED ORAL DAILY
Qty: 30 TABLET | Refills: 4 | Status: SHIPPED | OUTPATIENT
Start: 2022-07-12 | End: 2022-08-09

## 2022-07-12 ASSESSMENT — FIBROSIS 4 INDEX: FIB4 SCORE: 1.93

## 2022-07-12 NOTE — ASSESSMENT & PLAN NOTE
"See \"syncope and collapse\"    Cards referral   Continue Metoprolol    BP cannot tolerate lisinopril with worsening symptoms of dizziness and hypotension at home. Discontinue lisinopril  "

## 2022-07-12 NOTE — PROGRESS NOTES
"Subjective:     CC: Hospital follow up    HPI:   Sim presents today for hospital follow up, see \"syncope and collapse\" for hospitalization overview    Problem   Systolic Heart Failure (Hcc)    EF 30% 05/22 on most recent echocardiogram while inpatient. See \"syncope and collapse\"    Symptomatic with concomitant severe aortic stenosis requiring TAVR. Cannot tolerate GDMT at full doses due to persistent hypotension and bradycardia in the hospital which has been a persistent problem since discharge.     Hypotensive today in clinic to 107/70, bradycardic to 54.     Reports symptoms at home with significant SOB with PT in home that is associated with physical exertion. This has been his baseline now for many months. No new CP, palpitations, worsening SOB, hypoxia, or syncope at home.      Nonrheumatic Aortic Valve Stenosis   Complete Tear of Medial Collateral Ligament of Left Knee    Confirmed on knee MRI while inpatient. Due to syncope and collapse at home, seen by ortho inpatient and recommended follow up.      Syncope and Collapse    Recent hospitalization for syncope and collapse admitted 5/17 discharged to skilled nursing 5/27, hospital diagnoses include severe aortic stenosis, atrial fibrillation, systolic heart failure with EF 30%, diverticulitis, and MCL/ACL tear with R knee seroma.     He was seen by cardiology for new a-fib, systolic heart failure, and aortic stenosis while inpatient. They recommend anticoagulation in conjunction with a neurology clearance (h/o intracranial hemorrhage), he should be considered for a TAVR, he should be on GDMT but cannot tolerate low BP given his severe aortic stenosis. Based on cardiology notes Sim's condition is precarious and palliative conversations were recommended.     Returned home from rehab on 6/25/222    Rehab stay was complicated by C diff, currently finishing course of vancomycin. Symptoms completely resolved.     Has home health with PT/OT.     Currently on " anticoagulation with warfarin with INR 3 days ago at 2.0. Patient is struggling significantly with dosing and has not had a provider to dose his warfarin since discharge from SNF. He would like to change to eliquis as he feels he will not be able to take warfarin as needed to maintain a therapeutic INR at home.          Current Outpatient Medications Ordered in Epic   Medication Sig Dispense Refill   • vancomycin (VANCOCIN) 125 MG capsule      • potassium chloride SA (KDUR) 20 MEQ Tab CR Take 1 Tablet by mouth every day. 60 Tablet 0   • warfarin (COUMADIN) 7.5 MG Tab Take 1 Tablet by mouth every day at 6 PM. 30 Tablet 0   • furosemide (LASIX) 20 MG Tab Take 1 Tablet by mouth every day. 30 Tablet 0   • metoprolol SR (TOPROL XL) 25 MG TABLET SR 24 HR Take 0.5 Tablet by mouth every day. 30 Tablet 0   • lisinopril (PRINIVIL) 2.5 MG Tab Take 1 Tablet by mouth 2 times a day. 60 Tablet 0   • allopurinol (ZYLOPRIM) 300 MG Tab Take 300-450 mg by mouth 2 times a day. 300 mg in the AM  450 mg in the PM     • acetaminophen (TYLENOL) 500 MG Tab Take 500 mg by mouth every morning.     • tamsulosin (FLOMAX) 0.4 MG capsule TAKE 1 CAPSULE BY MOUTH 30 MINUTES AFTER BREAKFAST FOR 60 DAYS (Patient taking differently: Take 0.4 mg by mouth every day.) 60 Capsule 0   • prazosin (MINIPRESS) 1 MG Cap Take 1 Capsule by mouth every evening. 1 po at night time 30 Capsule 6   • predniSONE (DELTASONE) 20 MG Tab Take 1 tablet by mouth daily for one day (Patient not taking: Reported on 7/12/2022) 1 Tablet 0   • docusate sodium (COLACE) 100 MG Cap Take 100 mg by mouth every day. (Patient not taking: Reported on 7/12/2022)       No current [facility]-ordered facility-administered medications on file.         ROS:  Gen: no fevers/chills, weight loss since hospitalization of 40lbs, expected due to poor PO intake which has improved  Eyes: no changes in vision  ENT: no sore throat, no hearing loss, no bloody nose  Pulm: + sob, no cough  CV: no chest pain, no  "palpitations  GI: no nausea/vomiting, no diarrhea  : no dysuria  MSk: no myalgias  Skin: no rash  Neuro: no headaches, no numbness/tingling  Heme/Lymph: no easy bruising      Objective:     Exam:  Pulse (!) 54   Temp 36.4 °C (97.6 °F)   Ht 1.854 m (6' 1\")   Wt (!) 136 kg (300 lb)   SpO2 96%   BMI 39.58 kg/m²  Body mass index is 39.58 kg/m².    Gen: Alert and oriented, No apparent distress.  Neck: Neck is supple without lymphadenopathy.  Lungs: Normal effort, CTA bilaterally, no wheezes, rhonchi, or rales  CV: Bradycardic rate and otherwise normal rhythm. Systolic murmur,  no rubs, or gallops.  Ext: No clubbing, cyanosis, edema.      Labs: reviewed most recent labs from [facility].  eGFR 45-66 for inpatient labs with Cr 1.13-1.56. CBC with mild anemia 12.6    Assessment & Plan:     77 y.o. male with the following -     Problem List Items Addressed This Visit     Chronic kidney disease, unspecified     Longstanding history   eGFR most recently 45 with Cr 1.5           Relevant Orders    CHOLESTEROL    Anxiety     Start sertraline 25, follow up in 3 weeks to discuss efficacy and increasing dose           Relevant Medications    sertraline (ZOLOFT) 25 MG tablet    Syncope and collapse     Cardiology referral for management of Aortic stenosis, systolic heart failure, and atrial fibrillation.     Discontinue warfarin, initiate eliquis 5 mg BID  Continue Metoprolol at current dose, consider decreasing to 12.5 if pulse drops below 50. Or patient becomes symptomatic. Continue lasix 20 mg, check CMP.    Gave very strong return precautions for return to clinic and ED. Any CP, significant SOB, hypoxia, bradycardia and tachycardia at home, significant hypotension with SBP <100 or DBP <60. Patient and daughter express understanding.     For C diff, continue vancomycin until completion of regimen. RTC for any recurrence of diarrhea.            Relevant Orders    Comp Metabolic Panel    CBC WITH DIFFERENTIAL    REFERRAL TO " "CARDIOLOGY    Systolic heart failure (HCC)     See \"syncope and collapse\"    Cards referral   Continue Metoprolol    BP cannot tolerate lisinopril with worsening symptoms of dizziness and hypotension at home. Discontinue lisinopril           Relevant Medications    apixaban (ELIQUIS) 5mg Tab    Nonrheumatic aortic valve stenosis     See \"syncope and collapse\"    Severe Aortic stenosis complicated by systolic heart failure and subsequent hypotension and bradycardia. Poor prognosis without TAVR. Needs cardiology for TAVR workup and referral.     Cards referral   DC lisinopril for persistent symptomatic hypotension           Relevant Medications    apixaban (ELIQUIS) 5mg Tab    Other Relevant Orders    REFERRAL TO CARDIOLOGY    Complete tear of medial collateral ligament of left knee     Stable, non weight bearing but working with PT. See knee MRI from 05/22    Orthopedic referral for definitive care           Relevant Orders    Referral to Orthopedics          "

## 2022-07-12 NOTE — ASSESSMENT & PLAN NOTE
Stable, non weight bearing but working with PT. See knee MRI from 05/22    Orthopedic referral for definitive care

## 2022-07-12 NOTE — ASSESSMENT & PLAN NOTE
Cardiology referral for management of Aortic stenosis, systolic heart failure, and atrial fibrillation.     Discontinue warfarin, initiate eliquis 5 mg BID  Continue Metoprolol at current dose, consider decreasing to 12.5 if pulse drops below 50. Or patient becomes symptomatic. Continue lasix 20 mg, check CMP.    Gave very strong return precautions for return to clinic and ED. Any CP, significant SOB, hypoxia, bradycardia and tachycardia at home, significant hypotension with SBP <100 or DBP <60. Patient and daughter express understanding.     For C diff, continue vancomycin until completion of regimen. RTC for any recurrence of diarrhea.

## 2022-07-13 NOTE — ASSESSMENT & PLAN NOTE
"See \"syncope and collapse\"    Severe Aortic stenosis complicated by systolic heart failure and subsequent hypotension and bradycardia. Poor prognosis without TAVR. Needs cardiology for TAVR workup and referral.     Cards referral   DC lisinopril for persistent symptomatic hypotension  "

## 2022-07-21 ENCOUNTER — TELEPHONE (OUTPATIENT)
Dept: MEDICAL GROUP | Facility: CLINIC | Age: 77
End: 2022-07-21

## 2022-07-21 NOTE — TELEPHONE ENCOUNTER
VOICEMAIL  1. Caller Name: [facility]                      Call Back Number: [phone]    2. Message: Sim has symptoms of a UTI- Burning, pain when urinating, Urgency. Urine will be collected. If MD needs to get those results- it's going to be sent to [facility] [phone]    3. Patient approves office to leave a detailed voicemail/MyChart message: N\A

## 2022-07-22 NOTE — TELEPHONE ENCOUNTER
Patient scheduled to see me 8/1. Will keep this appointment. If any other concerns arise, please call to schedule sick visit on day of-- I should have slots for this.     Will have MA request UA from lab kayy    If evidence of infection will prescribe antibiotics.

## 2022-08-01 ENCOUNTER — APPOINTMENT (OUTPATIENT)
Dept: MEDICAL GROUP | Facility: CLINIC | Age: 77
End: 2022-08-01
Payer: COMMERCIAL

## 2022-08-02 ENCOUNTER — TELEPHONE (OUTPATIENT)
Dept: MEDICAL GROUP | Facility: CLINIC | Age: 77
End: 2022-08-02
Payer: COMMERCIAL

## 2022-08-02 NOTE — TELEPHONE ENCOUNTER
Tati Monroy (Sim's daughter) left a voicemail on the backline requesting to be called back so that she could assist her parents in getting rescheduled after their appointment had to be cancelled by their provider.    Sim has recently been released from the hospital and Dr Russell wanted to follow up with him on the anti-anxiety medication that she prescribed. As such, I have scheduled the patient during the providers documentation time on 8/9/2022.

## 2022-08-09 ENCOUNTER — OFFICE VISIT (OUTPATIENT)
Dept: MEDICAL GROUP | Facility: CLINIC | Age: 77
End: 2022-08-09
Payer: COMMERCIAL

## 2022-08-09 VITALS
HEIGHT: 73 IN | OXYGEN SATURATION: 97 % | HEART RATE: 92 BPM | WEIGHT: 294 LBS | TEMPERATURE: 98 F | BODY MASS INDEX: 38.97 KG/M2 | SYSTOLIC BLOOD PRESSURE: 106 MMHG | DIASTOLIC BLOOD PRESSURE: 72 MMHG

## 2022-08-09 DIAGNOSIS — F41.9 ANXIETY: ICD-10-CM

## 2022-08-09 DIAGNOSIS — R31.9 HEMATURIA, UNSPECIFIED TYPE: ICD-10-CM

## 2022-08-09 DIAGNOSIS — I35.0 NONRHEUMATIC AORTIC VALVE STENOSIS: ICD-10-CM

## 2022-08-09 DIAGNOSIS — Z51.5 END OF LIFE CARE: ICD-10-CM

## 2022-08-09 DIAGNOSIS — R06.00 DYSPNEA, UNSPECIFIED TYPE: ICD-10-CM

## 2022-08-09 DIAGNOSIS — I50.20 SYSTOLIC HEART FAILURE, UNSPECIFIED HF CHRONICITY (HCC): ICD-10-CM

## 2022-08-09 PROCEDURE — 99213 OFFICE O/P EST LOW 20 MIN: CPT | Mod: GE | Performed by: FAMILY MEDICINE

## 2022-08-09 RX ORDER — TAMSULOSIN HYDROCHLORIDE 0.4 MG/1
CAPSULE ORAL
Qty: 60 CAPSULE | Refills: 2 | Status: SHIPPED | OUTPATIENT
Start: 2022-08-09 | End: 2023-02-01

## 2022-08-09 RX ORDER — SERTRALINE HYDROCHLORIDE 25 MG/1
50 TABLET, FILM COATED ORAL DAILY
Qty: 30 TABLET | Refills: 4 | Status: SHIPPED | OUTPATIENT
Start: 2022-08-09 | End: 2022-09-02 | Stop reason: SDUPTHER

## 2022-08-09 RX ORDER — LORAZEPAM 0.5 MG/1
0.5 TABLET ORAL EVERY 4 HOURS PRN
Qty: 30 TABLET | Refills: 0 | Status: CANCELLED | OUTPATIENT
Start: 2022-08-09

## 2022-08-09 RX ORDER — SERTRALINE HYDROCHLORIDE 25 MG/1
50 TABLET, FILM COATED ORAL DAILY
Qty: 30 TABLET | Refills: 4 | Status: SHIPPED | OUTPATIENT
Start: 2022-08-09 | End: 2022-08-09

## 2022-08-09 RX ORDER — LORAZEPAM 0.5 MG/1
0.5 TABLET ORAL EVERY 4 HOURS PRN
Qty: 45 TABLET | Refills: 2 | Status: SHIPPED | OUTPATIENT
Start: 2022-08-09 | End: 2022-09-02 | Stop reason: SDUPTHER

## 2022-08-09 ASSESSMENT — FIBROSIS 4 INDEX: FIB4 SCORE: 1.16

## 2022-08-09 NOTE — Clinical Note
Palak Tolentino,   Would you please reach out to the cardiology office at [facility] and request records of Sim's visit last week? It has details of his discussion about a TAVR that I would like to document.

## 2022-08-10 NOTE — ASSESSMENT & PLAN NOTE
Given pervasive nature of anxiety and new palliative lense for therapy: add lorazepan 0.5-1 mg tabs q 4 hours prn as needed for anxiety    no negative side effects with SSRI, limited improvement (albeit significant social developments affecting anxiety) will increase dose to 50 mg daily of sertraline. Discussed side effects and benefits of taper if discontinuation is desired.

## 2022-08-10 NOTE — PROGRESS NOTES
"Subjective:     CC: Anxiety, palliative care discussion    HPI:   Sim presents today with:     Problem   End of Life Care    See Systolic heart failure and anxiety     Hematuria    reviewed records regarding recent ED visit, imaging showing bladder mass, hematuria. Patient declined urology consult as he does not wish to pursue any aggressive therapies and currently has no concerning urinary symptoms.        Systolic Heart Failure (Hcc)    EF 30% 05/22 on most recent echocardiogram while inpatient. See \"syncope and collapse\"    Symptomatic with concomitant severe aortic stenosis requiring TAVR, per patient who finally was able to meet without outpatient cardiology regarding candidacy for TAVR he is not a candidate and the cardiologist told him that his illness was terminal. Patient considering palliative care and making care decisions consistent with goals of palliative care. Cannot tolerate GDMT at full doses due to persistent hypotension and bradycardia in the hospital which has been a problem since discharge.     Hypotensive today in clinic to 106/72, pulse 92.     Reports symptoms at home with significant SOB with PT in home that is associated with physical exertion. This has been his baseline now for many months. No new CP, palpitations, worsening SOB, hypoxia, BLE swelling, or syncope at home.      Nonrheumatic Aortic Valve Stenosis    See \"systolic heart failure\"     Anxiety    Significant burden of panic attacks and symptoms also consistent with PTSD. Daily morning panic attacks typically related to pervasive anxiety stemming from his health problems and debility as well as the suicide of his mother and brother. Recently was told he has a terminal heart condition by outpatient cardiologist. No prognosis given. He often wakes up every day wondering if it will be his last. In our discussions of his fears he reflects that he is not necessarily afraid of dying but is more afraid of leaving his wife Jackie " "behind if he does pass soon.     Started sertraline 25 mg at last visit. No negative side effects. Interested in increased dose.      Dyspnea    See Systolic HF and anxiety         Current Outpatient Medications Ordered in Epic   Medication Sig Dispense Refill   • tamsulosin (FLOMAX) 0.4 MG capsule TAKE 1 CAPSULE BY MOUTH 30 MINUTES AFTER BREAKFAST 60 Capsule 2   • sertraline (ZOLOFT) 25 MG tablet Take 2 Tablets by mouth every day. 30 Tablet 4   • apixaban (ELIQUIS) 5mg Tab Take 1 Tablet by mouth 2 times a day. 180 Tablet 3   • potassium chloride SA (KDUR) 20 MEQ Tab CR Take 1 Tablet by mouth every day. 60 Tablet 0   • furosemide (LASIX) 20 MG Tab Take 1 Tablet by mouth every day. 30 Tablet 0   • metoprolol SR (TOPROL XL) 25 MG TABLET SR 24 HR Take 0.5 Tablet by mouth every day. 30 Tablet 0   • allopurinol (ZYLOPRIM) 300 MG Tab Take 300-450 mg by mouth 2 times a day. 300 mg in the AM  450 mg in the PM     • acetaminophen (TYLENOL) 500 MG Tab Take 500 mg by mouth every morning.     • prazosin (MINIPRESS) 1 MG Cap Take 1 Capsule by mouth every evening. 1 po at night time 30 Capsule 6     No current [facility]-ordered facility-administered medications on file.       ROS:  Gen: no fevers/chills, no changes in weight  Eyes: no changes in vision  ENT: no sore throat, no hearing loss, no bloody nose  Pulm: no change in sob, no cough  CV: no chest pain, + palpitations with anxiety  GI: no nausea/vomiting, no diarrhea  : no dysuria, + hematuria  MSk: no myalgias  Skin: no rash  Neuro: no headaches, no numbness/tingling  Heme/Lymph: no easy bruising      Objective:     Exam:  /72 (BP Location: Left arm)   Pulse 92   Temp 36.7 °C (98 °F)   Ht 1.854 m (6' 1\")   Wt (!) 133 kg (294 lb)   SpO2 97%   BMI 38.79 kg/m²  Body mass index is 38.79 kg/m².    Gen: Alert and oriented, No apparent distress.  Neck: Neck is supple without lymphadenopathy.  Lungs: Normal effort, CTA bilaterally, no wheezes, rhonchi, or rales. No " "respiratory distress  CV: Regular rate and rhythm. + systolic murmur III/VI murmur, no rubs  Ext: No clubbing, cyanosis, scant pitting edema.      Labs: reviewed recent ER records  Assessment & Plan:     77 y.o. male with the following -     Problem List Items Addressed This Visit     Dyspnea     See systolic HF and anxiety           Anxiety     Given pervasive nature of anxiety and new palliative lense for therapy: add lorazepan 0.5-1 mg tabs q 4 hours prn as needed for anxiety    no negative side effects with SSRI, limited improvement (albeit significant social developments affecting anxiety) will increase dose to 50 mg daily of sertraline. Discussed side effects and benefits of taper if discontinuation is desired.                  Relevant Medications    sertraline (ZOLOFT) 25 MG tablet    Systolic heart failure (HCC)     Goal to transition to palliative therapies. Sim is in the early stages of determining what his goals of care are. He has recently declined a urology consultation for hematuria and bladder mass discovered recently in ED after his meeting with cardiology. He is aware that bladder mass could be cancer, but finds himself more comfortable with not pursuing treatment after discovering the terminal nature of his heart condition.     His self described goals are to spend time with the people he loves, decrease his symptoms in a way such that he could potentially make it to  to see the [facility]. He would like therapies that keep him comfortable and that are not necessarily prolonging his life but keeping him comfortable and happy for as long as he can be.     His most concerning symptoms at present is his anxiety and SOB.  See \"SOB\"     No plan to follow up with cardiology.            Nonrheumatic aortic valve stenosis     See \"systolic heart failure\"           End of life care     S/p POLST completion.   Clear goals on resucitation: DNR/DNI  Encouraged patient to consider under what " circumstances he would like any medical treatment and how much treatment he would want.     Currently has home health services and family feels overall well supported.     Discussed at length hospice vs palliative care and goals that are patient centric with comfort and enjoyment of life in mind vs life prolonging measures.     Initiated Lorazepam for anxiety and SOB.   Patient declined desire for pain control given recent knee pathology. Option open if desired.     Revisit new goals at next visit. Plan for treatment of patients symptoms as needed with goal of comfort at this time. Medication reconciliation completed with shared decision making with patient. No other changes for now other than increase in sertraline and initiation of lorazepam           Hematuria     No urinary symptoms. Continue tamsulosin, patient elects to also continue prazosin. Discussed that we may consider discontinuation of prazosin in future.     No urology consultation. Patient aware that mass is possible cancerous.

## 2022-08-10 NOTE — ASSESSMENT & PLAN NOTE
S/p POLST completion.   Clear goals on resucitation: DNR/DNI  Encouraged patient to consider under what circumstances he would like any medical treatment and how much treatment he would want.     Currently has home health services and family feels overall well supported.     Discussed at length hospice vs palliative care and goals that are patient centric with comfort and enjoyment of life in mind vs life prolonging measures.     Initiated Lorazepam for anxiety and SOB.   Patient declined desire for pain control given recent knee pathology. Option open if desired.     Revisit new goals at next visit. Plan for treatment of patients symptoms as needed with goal of comfort at this time. Medication reconciliation completed with shared decision making with patient. No other changes for now other than increase in sertraline and initiation of lorazepam

## 2022-08-10 NOTE — ASSESSMENT & PLAN NOTE
See systolic HF and anxiety   Quality 226: Preventive Care And Screening: Tobacco Use: Screening And Cessation Intervention: Patient screened for tobacco use and is an ex/non-smoker Quality 431: Preventive Care And Screening: Unhealthy Alcohol Use - Screening: Patient not identified as an unhealthy alcohol user when screened for unhealthy alcohol use using a systematic screening method Detail Level: Detailed

## 2022-08-10 NOTE — ASSESSMENT & PLAN NOTE
No urinary symptoms. Continue tamsulosin, patient elects to also continue prazosin. Discussed that we may consider discontinuation of prazosin in future.     No urology consultation. Patient aware that mass is possible cancerous.

## 2022-08-10 NOTE — ASSESSMENT & PLAN NOTE
"Goal to transition to palliative therapies. Sim is in the early stages of determining what his goals of care are. He has recently declined a urology consultation for hematuria and bladder mass discovered recently in ED after his meeting with cardiology. He is aware that bladder mass could be cancer, but finds himself more comfortable with not pursuing treatment after discovering the terminal nature of his heart condition.     His self described goals are to spend time with the people he loves, decrease his symptoms in a way such that he could potentially make it to  to see the [facility]. He would like therapies that keep him comfortable and that are not necessarily prolonging his life but keeping him comfortable and happy for as long as he can be.     His most concerning symptoms at present is his anxiety and SOB.  See \"SOB\"     No plan to follow up with cardiology.   "

## 2022-08-19 NOTE — RESULT ENCOUNTER NOTE
Labs reviewed including UA, CBC, and lipid panel. Since ordering these I have seen Sim and we have discussed a transition to palliative care focus with his care. He does not with to see urology for his hematuria. And he has limited signs of infection and no other urinary symptoms at this time. His CBC is reassuring with an ever so mild increase in neutrophil count. His CMP shows elevated glucose levels and otherwise un-concerning mild deviations from normal in bicarb (19).     Sim knows to come in to see me for any new or worsening symptoms.

## 2022-08-24 ENCOUNTER — TELEPHONE (OUTPATIENT)
Dept: MEDICAL GROUP | Facility: CLINIC | Age: 77
End: 2022-08-24
Payer: COMMERCIAL

## 2022-08-24 NOTE — TELEPHONE ENCOUNTER
VOICEMAIL  1. Caller Name: Tati ( Sim Daughter )                      Call Back Number: [phone] (home)      2. Message:   Dr. Russell raise up dose in the  Sertraline to 50 mg . Tati receive sertraline 25 mg . She can give double to dad  but Tati request to resend  a new prescription with correct dose.    3. Patient approves office to leave a detailed voicemail/MyChart message: yes

## 2022-08-26 ENCOUNTER — TELEPHONE (OUTPATIENT)
Dept: MEDICAL GROUP | Facility: CLINIC | Age: 77
End: 2022-08-26
Payer: COMMERCIAL

## 2022-08-26 NOTE — TELEPHONE ENCOUNTER
----- Message from Gabriela Bush M.D. sent at 8/9/2022  6:45 PM PDT -----  Palak Tolentino,     Would you please reach out to the cardiology office at [facility] and request records of Sim's visit last week? It has details of his discussion about a TAVR that I would like to document.

## 2022-09-02 DIAGNOSIS — F41.9 ANXIETY: ICD-10-CM

## 2022-09-02 RX ORDER — LORAZEPAM 0.5 MG/1
0.5 TABLET ORAL EVERY 4 HOURS PRN
Qty: 45 TABLET | Refills: 0 | Status: SHIPPED | OUTPATIENT
Start: 2022-09-02 | End: 2022-10-03 | Stop reason: SDUPTHER

## 2022-09-12 DIAGNOSIS — I50.20 SYSTOLIC HEART FAILURE, UNSPECIFIED HF CHRONICITY (HCC): ICD-10-CM

## 2022-09-12 RX ORDER — METOPROLOL SUCCINATE 25 MG/1
12.5 TABLET, EXTENDED RELEASE ORAL DAILY
Qty: 90 TABLET | Refills: 3 | Status: SHIPPED | OUTPATIENT
Start: 2022-09-12 | End: 2023-06-22 | Stop reason: SDUPTHER

## 2022-09-12 RX ORDER — POTASSIUM CHLORIDE 20 MEQ/1
20 TABLET, EXTENDED RELEASE ORAL DAILY
Qty: 90 TABLET | Refills: 3 | Status: SHIPPED | OUTPATIENT
Start: 2022-09-12 | End: 2023-06-22 | Stop reason: SDUPTHER

## 2022-09-12 RX ORDER — FUROSEMIDE 20 MG/1
20 TABLET ORAL DAILY
Qty: 90 TABLET | Refills: 3 | Status: SHIPPED | OUTPATIENT
Start: 2022-09-12 | End: 2023-06-22 | Stop reason: SDUPTHER

## 2022-10-03 DIAGNOSIS — F41.9 ANXIETY: ICD-10-CM

## 2022-10-04 RX ORDER — LORAZEPAM 0.5 MG/1
0.5 TABLET ORAL EVERY 4 HOURS PRN
Qty: 45 TABLET | Refills: 0 | Status: SHIPPED | OUTPATIENT
Start: 2022-10-04 | End: 2022-11-11

## 2022-10-04 RX ORDER — ALLOPURINOL 300 MG/1
300-450 TABLET ORAL 2 TIMES DAILY
Qty: 30 TABLET | Refills: 6 | Status: SHIPPED | OUTPATIENT
Start: 2022-10-04 | End: 2022-12-19 | Stop reason: SDUPTHER

## 2022-10-04 RX ORDER — PRAZOSIN HYDROCHLORIDE 1 MG/1
1 CAPSULE ORAL EVERY EVENING
Qty: 30 CAPSULE | Refills: 6 | Status: SHIPPED | OUTPATIENT
Start: 2022-10-04 | End: 2023-04-26

## 2022-11-04 ENCOUNTER — PATIENT MESSAGE (OUTPATIENT)
Dept: HEALTH INFORMATION MANAGEMENT | Facility: OTHER | Age: 77
End: 2022-11-04

## 2022-11-10 DIAGNOSIS — F41.9 ANXIETY: ICD-10-CM

## 2022-11-10 NOTE — TELEPHONE ENCOUNTER
Received request via: Pharmacy    Was the patient seen in the last year in this department? Yes    Does the patient have an active prescription (recently filled or refills available) for medication(s) requested? No    Does the patient have long-term Plus and need 100 day supply (blood pressure, diabetes and cholesterol meds only)? Patient does not have SCP

## 2022-11-10 NOTE — TELEPHONE ENCOUNTER
Received request via: Pharmacy    Was the patient seen in the last year in this department? Yes    Does the patient have an active prescription (recently filled or refills available) for medication(s) requested? No    Does the patient have custodial Plus and need 100 day supply (blood pressure, diabetes and cholesterol meds only)? Patient does not have SCP

## 2022-11-11 RX ORDER — LORAZEPAM 0.5 MG/1
0.5 TABLET ORAL EVERY 4 HOURS PRN
Qty: 45 TABLET | Refills: 0 | Status: SHIPPED | OUTPATIENT
Start: 2022-11-11 | End: 2022-12-23

## 2022-12-19 RX ORDER — ALLOPURINOL 300 MG/1
300-450 TABLET ORAL 2 TIMES DAILY
Qty: 30 TABLET | Refills: 6 | Status: SHIPPED | OUTPATIENT
Start: 2022-12-19 | End: 2023-01-31 | Stop reason: SDUPTHER

## 2022-12-19 NOTE — TELEPHONE ENCOUNTER
Patient is requesting a refill on his medication.     They wish Randa and  a very merry vicky. Thank you !

## 2022-12-20 ENCOUNTER — PATIENT MESSAGE (OUTPATIENT)
Dept: MEDICAL GROUP | Facility: CLINIC | Age: 77
End: 2022-12-20
Payer: COMMERCIAL

## 2022-12-20 RX ORDER — ALLOPURINOL 100 MG/1
TABLET ORAL
COMMUNITY
End: 2022-12-20 | Stop reason: SDUPTHER

## 2022-12-20 NOTE — TELEPHONE ENCOUNTER
Received request via: Patient    Was the patient seen in the last year in this department? Yes  pt daughter call requesting Allopurinol 100 mg tab . Pt is out of the allopurinol 100 mg tab.  Does the patient have an active prescription (recently filled or refills available) for medication(s) requested? No    Does the patient have shelter Plus and need 100 day supply (blood pressure, diabetes and cholesterol meds only)? Patient does not have SCP

## 2022-12-21 RX ORDER — ALLOPURINOL 100 MG/1
TABLET ORAL
Qty: 90 TABLET | Refills: 3 | Status: SHIPPED | OUTPATIENT
Start: 2022-12-21 | End: 2023-01-13

## 2022-12-22 DIAGNOSIS — F41.9 ANXIETY: ICD-10-CM

## 2022-12-23 RX ORDER — LORAZEPAM 0.5 MG/1
0.5 TABLET ORAL EVERY 4 HOURS PRN
Qty: 45 TABLET | Refills: 0 | Status: SHIPPED | OUTPATIENT
Start: 2022-12-23 | End: 2023-06-09

## 2023-01-13 ENCOUNTER — OFFICE VISIT (OUTPATIENT)
Dept: MEDICAL GROUP | Facility: CLINIC | Age: 78
End: 2023-01-13
Payer: COMMERCIAL

## 2023-01-13 DIAGNOSIS — Z51.5 END OF LIFE CARE: ICD-10-CM

## 2023-01-13 PROCEDURE — 99213 OFFICE O/P EST LOW 20 MIN: CPT | Mod: 95,GE | Performed by: STUDENT IN AN ORGANIZED HEALTH CARE EDUCATION/TRAINING PROGRAM

## 2023-01-13 RX ORDER — ALLOPURINOL 300 MG/1
450 TABLET ORAL DAILY
Qty: 270 TABLET | Refills: 1 | Status: SHIPPED | OUTPATIENT
Start: 2023-01-13 | End: 2023-02-23

## 2023-01-13 ASSESSMENT — PATIENT HEALTH QUESTIONNAIRE - PHQ9: CLINICAL INTERPRETATION OF PHQ2 SCORE: 0

## 2023-01-13 NOTE — PROGRESS NOTES
Virtual Visit: Established Patient   This visit was conducted via  telephone  using secure.    The patient was in their home in the [facility].    The patient's identity was confirmed and verbal consent was obtained for this virtual visit.    Subjective:   CC:   Chief Complaint   Patient presents with    Diabetes Follow-up     Meds , Amaris      Sim Osorio is a 77 y.o. male presenting for evaluation and management of his palliative care since his diagnosis of severe aortic stenosis with systolic heart failure (EF 30% 05/22). He was evaluated by outpatient cardiology and is not considered a candidate for TAVR. He has since elected to pursue goals of care more aligned with comfort than in prolongation of life.     Today we discussed the cost of his medications and the necessity of his medication list.     Initially Sim was being treated at home with home health PT/OT and requiring full time assistance from his wife Jackie and his Daughter Tati.     Since his discharge from Rehab 6/25/22 he has slowly regained his ability/stability and strength to walk. He feels comfortable transferring himself to the shower and the toilet and is much more competent with his ADLs. His daughter and wife still care for him and he remains dependent on his power chair, but he feels his quality of life has significantly improved since initiation of a palliative care philosophy.     His largest concern is his persistent SOB which is largely relieved when he takes his ativan. This often treats his anxiety very well which used to be a very pervasive and disruptive symptom for him.     His BP has been very good with most values systolic in the 120's and diastolic in the 60's. He has few episodes of hypotension as he was having much more frequently around the time of our last visit.     His largest concern today is the cost of his elliquis and his recent falls. He does not often leave his home, and certainly not alone, recently he went to  try and start his car to make sure the battery was not dead and fell on the pavement and required the assistance of EMS to help him back into his home. Only injury sustained is swelling to his previously injured knee (unrepaired ACL/MCL tears) He denies any other injury or bleeding. This was a few weeks ago. We discussed the indication for his anticoagulation being his Afib and the risk of bleeding as well as the cost of the medication. Given his goals of care he feels that given this information he would be happy to stop taking this.     ROS   See HPI, otherwise 10 system ROS negative.     Current medicines (including changes today)  Current Outpatient Medications   Medication Sig Dispense Refill    LORazepam (ATIVAN) 0.5 MG Tab Take 1 Tablet by mouth every four hours as needed for Anxiety for up to 30 days. 45 Tablet 0    allopurinol (ZYLOPRIM) 100 MG Tab allopurinol 100 mg tablet  TAKE 1 TABLET BY MOUTH IN THE MORNING AND TAKE 1 & 1/2 (ONE & ONE-HALF) TABLET AT NOON Strength: 100 mg 90 Tablet 3    allopurinol (ZYLOPRIM) 300 MG Tab Take 1-1.5 Tablets by mouth 2 times a day. 300 mg in the  mg in the PM 30 Tablet 6    sertraline (ZOLOFT) 50 MG Tab Take 1 tablet by mouth once daily for 90 days 90 Tablet 0    prazosin (MINIPRESS) 1 MG Cap Take 1 Capsule by mouth every evening. 1 po at night time 30 Capsule 6    furosemide (LASIX) 20 MG Tab Take 1 Tablet by mouth every day. 90 Tablet 3    metoprolol SR (TOPROL XL) 25 MG TABLET SR 24 HR Take 0.5 Tablet by mouth every day. 90 Tablet 3    potassium chloride SA (KDUR) 20 MEQ Tab CR Take 1 Tablet by mouth every day. 90 Tablet 3    tamsulosin (FLOMAX) 0.4 MG capsule TAKE 1 CAPSULE BY MOUTH 30 MINUTES AFTER BREAKFAST 60 Capsule 2    apixaban (ELIQUIS) 5mg Tab Take 1 Tablet by mouth 2 times a day. 180 Tablet 3    acetaminophen (TYLENOL) 500 MG Tab Take 500 mg by mouth every morning.       No current facility-administered medications for this visit.       Patient Active  Problem List    Diagnosis Date Noted    End of life care 08/09/2022    Hematuria 08/09/2022    Systolic heart failure (HCC) 07/12/2022    Nonrheumatic aortic valve stenosis 07/12/2022    Complete tear of medial collateral ligament of left knee 07/12/2022    Syncope and collapse 05/17/2022    Axillary mass, right 03/21/2022    Anxiety 11/15/2021    Chronic kidney disease, unspecified 04/27/2021    Prediabetes 04/27/2021    Benign prostatic hyperplasia 02/25/2021    Colon cancer screening 02/25/2021    Intracranial hemorrhage (HCC) 02/25/2021    Primary osteoarthritis of both knees 10/05/2020    Chronic renal failure in pediatric patient, stage 3 (moderate) (HCC) 09/15/2020    Morbid obesity ([facility]) 09/15/2020    Noise-induced hearing loss of both ears 08/29/2019    Gout 01/09/2019    Essential hypertension 01/09/2019    Dyspnea 04/21/2012    Lower urinary tract infectious disease 04/21/2012    HYPOTENSION 04/21/2012        Objective:   There were no vitals taken for this visit.    Respiratory: Unlabored respiratory effort, no cough or audible wheeze    Assessment and Plan:   The following treatment plan was discussed:     1. End of life care    Other orders  - allopurinol (ZYLOPRIM) 300 MG Tab; Take 1.5 Tablets by mouth every day for 180 days.  Dispense: 270 Tablet; Refill: 1     S/p POLST completion.   Clear goals on resucitation: DNR/DNI  Encouraged patient to consider under what circumstances he would like any medical treatment and how much treatment he would want.      Currently has home health services and family feels overall well supported and that his quality of life has significantly improved since discontinuing life prolonging perspective on his care.      Discussed at length hospice vs palliative care and goals that are patient centric with comfort and enjoyment of life in mind vs life prolonging measures.      Successful treatment of SOB and anxiety with Lorazepam  Patient declined desire for pain control  given recent knee pathology. Option open if desired.               Revisit goals of care at every visit. Plan for treatment of patients symptoms as needed with goal of comfort at this time. Medication reconciliation completed with shared decision making with patient. No other changes, continue sertraline and lorazepam. These are working very well to control mood and anxiety symptoms.     Follow-up: as needed q 3-6 months

## 2023-01-14 NOTE — ASSESSMENT & PLAN NOTE
Revisit goals of care at every visit. Plan for treatment of patients symptoms as needed with goal of comfort at this time. Medication reconciliation completed with shared decision making with patient. No other changes, continue sertraline and lorazepam. These are working very well to control mood and anxiety symptoms.

## 2023-01-31 RX ORDER — ALLOPURINOL 300 MG/1
300 TABLET ORAL 2 TIMES DAILY
Qty: 30 TABLET | Refills: 6 | Status: SHIPPED | OUTPATIENT
Start: 2023-01-31 | End: 2023-02-23

## 2023-02-23 RX ORDER — ALLOPURINOL 300 MG/1
450 TABLET ORAL DAILY
Qty: 135 TABLET | Refills: 1 | Status: SHIPPED | OUTPATIENT
Start: 2023-02-23 | End: 2023-05-24

## 2023-02-24 NOTE — TELEPHONE ENCOUNTER
Fax came in- Sertraline-   Insurance requires 100 day supply. Please send new RX for at least 100 days.

## 2023-04-26 RX ORDER — PRAZOSIN HYDROCHLORIDE 1 MG/1
CAPSULE ORAL
Qty: 30 CAPSULE | Refills: 0 | Status: SHIPPED | OUTPATIENT
Start: 2023-04-26 | End: 2023-05-29

## 2023-06-08 DIAGNOSIS — F41.9 ANXIETY: ICD-10-CM

## 2023-06-09 RX ORDER — LORAZEPAM 0.5 MG/1
TABLET ORAL
Qty: 45 TABLET | Refills: 3 | Status: SHIPPED | OUTPATIENT
Start: 2023-06-09 | End: 2023-06-09

## 2023-06-22 ENCOUNTER — OFFICE VISIT (OUTPATIENT)
Dept: MEDICAL GROUP | Facility: CLINIC | Age: 78
End: 2023-06-22
Payer: COMMERCIAL

## 2023-06-22 VITALS
OXYGEN SATURATION: 93 % | WEIGHT: 271 LBS | TEMPERATURE: 98 F | DIASTOLIC BLOOD PRESSURE: 72 MMHG | BODY MASS INDEX: 35.92 KG/M2 | SYSTOLIC BLOOD PRESSURE: 112 MMHG | HEART RATE: 84 BPM | HEIGHT: 73 IN

## 2023-06-22 DIAGNOSIS — Z51.5 PALLIATIVE CARE ENCOUNTER: ICD-10-CM

## 2023-06-22 DIAGNOSIS — I50.20 SYSTOLIC HEART FAILURE, UNSPECIFIED HF CHRONICITY (HCC): ICD-10-CM

## 2023-06-22 DIAGNOSIS — F41.9 ANXIETY: ICD-10-CM

## 2023-06-22 DIAGNOSIS — I35.0 NONRHEUMATIC AORTIC VALVE STENOSIS: ICD-10-CM

## 2023-06-22 DIAGNOSIS — I50.22 CHRONIC SYSTOLIC HEART FAILURE (HCC): ICD-10-CM

## 2023-06-22 PROCEDURE — 3078F DIAST BP <80 MM HG: CPT | Performed by: STUDENT IN AN ORGANIZED HEALTH CARE EDUCATION/TRAINING PROGRAM

## 2023-06-22 PROCEDURE — 3074F SYST BP LT 130 MM HG: CPT | Performed by: STUDENT IN AN ORGANIZED HEALTH CARE EDUCATION/TRAINING PROGRAM

## 2023-06-22 PROCEDURE — 99213 OFFICE O/P EST LOW 20 MIN: CPT | Mod: GE | Performed by: STUDENT IN AN ORGANIZED HEALTH CARE EDUCATION/TRAINING PROGRAM

## 2023-06-22 RX ORDER — METOPROLOL SUCCINATE 25 MG/1
12.5 TABLET, EXTENDED RELEASE ORAL DAILY
Qty: 90 TABLET | Refills: 3 | Status: SHIPPED | OUTPATIENT
Start: 2023-06-22 | End: 2023-08-11 | Stop reason: SDUPTHER

## 2023-06-22 RX ORDER — ALLOPURINOL 300 MG/1
300 TABLET ORAL DAILY
Qty: 90 TABLET | Refills: 3 | Status: SHIPPED | OUTPATIENT
Start: 2023-06-22 | End: 2023-06-22 | Stop reason: SDUPTHER

## 2023-06-22 RX ORDER — PRAZOSIN HYDROCHLORIDE 1 MG/1
1 CAPSULE ORAL EVERY EVENING
Qty: 90 CAPSULE | Refills: 3 | Status: SHIPPED | OUTPATIENT
Start: 2023-06-22 | End: 2023-08-11 | Stop reason: SDUPTHER

## 2023-06-22 RX ORDER — POTASSIUM CHLORIDE 20 MEQ/1
20 TABLET, EXTENDED RELEASE ORAL DAILY
Qty: 90 TABLET | Refills: 3 | Status: SHIPPED | OUTPATIENT
Start: 2023-06-22 | End: 2023-08-11 | Stop reason: SDUPTHER

## 2023-06-22 RX ORDER — ALLOPURINOL 300 MG/1
300 TABLET ORAL DAILY
Qty: 90 TABLET | Refills: 3 | Status: SHIPPED | OUTPATIENT
Start: 2023-06-22 | End: 2023-08-11 | Stop reason: SDUPTHER

## 2023-06-22 RX ORDER — LORAZEPAM 0.5 MG/1
0.5 TABLET ORAL EVERY 4 HOURS PRN
Qty: 45 TABLET | Refills: 3 | Status: SHIPPED | OUTPATIENT
Start: 2023-06-22 | End: 2023-09-20

## 2023-06-22 RX ORDER — ALLOPURINOL 300 MG/1
TABLET ORAL DAILY
COMMUNITY
End: 2023-06-22 | Stop reason: SDUPTHER

## 2023-06-22 RX ORDER — TAMSULOSIN HYDROCHLORIDE 0.4 MG/1
CAPSULE ORAL
Qty: 120 CAPSULE | Refills: 3 | Status: SHIPPED | OUTPATIENT
Start: 2023-06-22 | End: 2023-08-11 | Stop reason: SDUPTHER

## 2023-06-22 RX ORDER — FUROSEMIDE 20 MG/1
20 TABLET ORAL DAILY
Qty: 90 TABLET | Refills: 3 | Status: SHIPPED | OUTPATIENT
Start: 2023-06-22 | End: 2023-07-26 | Stop reason: SDUPTHER

## 2023-06-22 ASSESSMENT — FIBROSIS 4 INDEX: FIB4 SCORE: 1.17

## 2023-06-22 NOTE — ASSESSMENT & PLAN NOTE
Palliative care referral, see HPI for current GOC discussion.     Ativan 0.5 mg as needed for panic, anxiety, and SOB secondary to NYHA Class IV heart failure

## 2023-06-22 NOTE — PROGRESS NOTES
Subjective:     CC: routine follow up    HPI:   Sim presents today for routine follow up    Problem   Palliative Care Encounter     HFrEF with NHYA class IV heart failure with severe aortic stenosis and is not a candidate for TAVR. Has been told by cardiology his diagnosis is terminal and non operative. Also notably has a concerning mass that is suspicious for cancer that he has elected to not pursue evaluation of.     S/p POLST completion.   Clear goals on resucitation: DNR/DNI  Encouraged patient to consider under what circumstances he would like any medical treatment and how much treatment he would want during previous visits. It has become very consistently clear to him that he would not like to reside anywhere other than his home for any length of time including nursing homes and even the hospital. There may be acute circumstances during which hospitalization may be beneficial, however he does not want to have any prolonged amount of time spent there.      He has a very supportive family, his wife Jackie and his daughter and granddaughter feel overall well supported. The family (all present at todays visit) feel he has improved in his mood, his anxiety and his overall quality of life has significantly improved since discontinuing life prolonging perspective on his care.      Discussed at length the principles of palliative care and goals that are patient centric with comfort and enjoyment of life in mind vs life prolonging measures.      Successful treatment of SOB and anxiety with Lorazepam  Patient declined desire for pain control given recent knee pathology. Option open if desired.      With regard to his medication list, many medications have been discontinued but he has found particular benefit in keeping his tamsulosin, prazosin, sertraline, lasix and potassium. We have added ativan 0.5 mg which he uses approximately once per day and to help with his very severe anxiety as it relates to his shortness of  "breath and new functional status.           Systolic Heart Failure (Hcc)    EF 30% 05/22 on most recent echocardiogram while inpatient. See \"syncope and collapse\"    Symptomatic with concomitant severe aortic stenosis requiring TAVR, per patient who finally was able to meet without outpatient cardiology regarding candidacy for TAVR he is not a candidate and the cardiologist told him that his illness was terminal. Patient considering palliative care and making care decisions consistent with goals of palliative care. Cannot tolerate GDMT at full doses due to persistent hypotension and bradycardia in the hospital which has been a problem since discharge.     Reports symptoms at home with significant SOB which has significantly improved over the last year as he has transitioned to symptomatic treatment goals. No new CP, palpitations, worsening SOB, hypoxia, BLE swelling, or syncope at home.          Current Outpatient Medications Ordered in Epic   Medication Sig Dispense Refill    allopurinol (ZYLOPRIM) 300 MG Tab Take  by mouth every day. 450  mg tab      LORazepam (ATIVAN) 0.5 MG Tab Take 1 Tablet by mouth every four hours as needed for Anxiety for up to 90 days. 45 Tablet 3    prazosin (MINIPRESS) 1 MG Cap TAKE 1 CAPSULE BY MOUTH IN THE EVENING 30 Capsule 3    sertraline (ZOLOFT) 50 MG Tab Take 1 tablet by mouth once daily 90 Tablet 1    tamsulosin (FLOMAX) 0.4 MG capsule TAKE 1 CAPSULE BY MOUTH ONCE DAILY 30 MINUTES AFTER BREAKFAST 120 Capsule 3    furosemide (LASIX) 20 MG Tab Take 1 Tablet by mouth every day. 90 Tablet 3    metoprolol SR (TOPROL XL) 25 MG TABLET SR 24 HR Take 0.5 Tablet by mouth every day. 90 Tablet 3    potassium chloride SA (KDUR) 20 MEQ Tab CR Take 1 Tablet by mouth every day. 90 Tablet 3    acetaminophen (TYLENOL) 500 MG Tab Take 500 mg by mouth every morning.       No current [facility]-ordered facility-administered medications on file.       ROS:  Gen: no fevers/chills, + significant (110 lb) " "weight loss in the past year  Eyes: no changes in vision  ENT: no sore throat, no hearing loss, no bloody nose  Pulm: no sob, no cough  CV: no chest pain, no palpitations  GI: no nausea/vomiting, no diarrhea  : no dysuria  MSk: no myalgias  Skin: no rash  Neuro: no headaches, no numbness/tingling  Heme/Lymph: no easy bruising      Objective:     Exam:  /72   Pulse 84   Temp 36.7 °C (98 °F)   Ht 1.854 m (6' 1\")   Wt 123 kg (271 lb)   SpO2 93%   BMI 35.75 kg/m²  Body mass index is 35.75 kg/m².    Gen: Alert and oriented, No apparent distress.   Neck: Neck is supple without lymphadenopathy.  Lungs: Labored with full sentences  CV: No cyanosis   Ext: No clubbing, cyanosis, edema.  Ambulates with walker      Labs: none    Assessment & Plan:     78 y.o. male with the following -     Problem List Items Addressed This Visit       Anxiety    Relevant Medications    sertraline (ZOLOFT) 50 MG Tab    LORazepam (ATIVAN) 0.5 MG Tab    Systolic heart failure (HCC)    Relevant Medications    prazosin (MINIPRESS) 1 MG Cap    metoprolol SR (TOPROL XL) 25 MG TABLET SR 24 HR    potassium chloride SA (KDUR) 20 MEQ Tab CR    furosemide (LASIX) 20 MG Tab    Other Relevant Orders    Referral to Palliative Care    Nonrheumatic aortic valve stenosis    Relevant Medications    prazosin (MINIPRESS) 1 MG Cap    metoprolol SR (TOPROL XL) 25 MG TABLET SR 24 HR    furosemide (LASIX) 20 MG Tab    Other Relevant Orders    Referral to Palliative Care    Palliative care encounter     Palliative care referral, see HPI for current [facility] discussion.     Ativan 0.5 mg as needed for panic, anxiety, and SOB secondary to NYHA Class IV heart failure                  "

## 2023-07-26 DIAGNOSIS — I50.20 SYSTOLIC HEART FAILURE, UNSPECIFIED HF CHRONICITY (HCC): ICD-10-CM

## 2023-07-28 RX ORDER — FUROSEMIDE 20 MG/1
20 TABLET ORAL DAILY
Qty: 90 TABLET | Refills: 3 | Status: SHIPPED | OUTPATIENT
Start: 2023-07-28 | End: 2023-08-11 | Stop reason: SDUPTHER

## 2023-08-11 DIAGNOSIS — F41.9 ANXIETY: ICD-10-CM

## 2023-08-11 DIAGNOSIS — I50.20 SYSTOLIC HEART FAILURE, UNSPECIFIED HF CHRONICITY (HCC): ICD-10-CM

## 2023-08-11 RX ORDER — FUROSEMIDE 20 MG/1
20 TABLET ORAL DAILY
Qty: 90 TABLET | Refills: 3 | Status: SHIPPED | OUTPATIENT
Start: 2023-08-11

## 2023-08-11 RX ORDER — TAMSULOSIN HYDROCHLORIDE 0.4 MG/1
CAPSULE ORAL
Qty: 120 CAPSULE | Refills: 3 | Status: SHIPPED | OUTPATIENT
Start: 2023-08-11

## 2023-08-11 RX ORDER — PRAZOSIN HYDROCHLORIDE 1 MG/1
1 CAPSULE ORAL EVERY EVENING
Qty: 90 CAPSULE | Refills: 3 | Status: SHIPPED | OUTPATIENT
Start: 2023-08-11

## 2023-08-11 RX ORDER — POTASSIUM CHLORIDE 20 MEQ/1
20 TABLET, EXTENDED RELEASE ORAL DAILY
Qty: 90 TABLET | Refills: 3 | Status: SHIPPED | OUTPATIENT
Start: 2023-08-11

## 2023-08-11 RX ORDER — LORAZEPAM 0.5 MG/1
0.5 TABLET ORAL EVERY 4 HOURS PRN
Qty: 45 TABLET | Refills: 3 | Status: CANCELLED | OUTPATIENT
Start: 2023-08-11 | End: 2023-11-09

## 2023-08-11 RX ORDER — ACETAMINOPHEN 500 MG
500 TABLET ORAL EVERY MORNING
Qty: 30 TABLET | Status: CANCELLED | OUTPATIENT
Start: 2023-08-11

## 2023-08-11 RX ORDER — METOPROLOL SUCCINATE 25 MG/1
12.5 TABLET, EXTENDED RELEASE ORAL DAILY
Qty: 90 TABLET | Refills: 3 | Status: SHIPPED | OUTPATIENT
Start: 2023-08-11

## 2023-08-11 RX ORDER — ALLOPURINOL 300 MG/1
300 TABLET ORAL DAILY
Qty: 90 TABLET | Refills: 3 | Status: SHIPPED | OUTPATIENT
Start: 2023-08-11 | End: 2024-08-05

## 2023-09-13 NOTE — PROGRESS NOTES
Monitor Summary   A-Fib 69-96  (R) PVC   -/.13/-  BBB   Per monitor room              Detail Level: Simple

## 2024-10-02 NOTE — TELEPHONE ENCOUNTER
Received request via: Patient    Was the patient seen in the last year in this department? Yes    Does the patient have an active prescription (recently filled or refills available) for medication(s) requested? No    
Cardiology